# Patient Record
Sex: MALE | Race: WHITE | NOT HISPANIC OR LATINO
[De-identification: names, ages, dates, MRNs, and addresses within clinical notes are randomized per-mention and may not be internally consistent; named-entity substitution may affect disease eponyms.]

---

## 2013-12-27 RX ORDER — METOPROLOL TARTRATE 50 MG
1 TABLET ORAL
Qty: 0 | Refills: 0 | DISCHARGE
Start: 2013-12-27

## 2013-12-27 RX ORDER — POTASSIUM CHLORIDE 20 MEQ
1 PACKET (EA) ORAL
Qty: 0 | Refills: 0 | DISCHARGE
Start: 2013-12-27

## 2013-12-27 RX ORDER — POTASSIUM CHLORIDE 20 MEQ
1 PACKET (EA) ORAL
Qty: 0 | Refills: 0 | COMMUNITY
Start: 2013-12-27

## 2013-12-27 RX ORDER — METOPROLOL TARTRATE 50 MG
1.5 TABLET ORAL
Qty: 0 | Refills: 0 | COMMUNITY
Start: 2013-12-27

## 2017-01-26 ENCOUNTER — APPOINTMENT (OUTPATIENT)
Dept: ORTHOPEDIC SURGERY | Facility: CLINIC | Age: 67
End: 2017-01-26

## 2017-03-06 ENCOUNTER — OUTPATIENT (OUTPATIENT)
Dept: OUTPATIENT SERVICES | Facility: HOSPITAL | Age: 67
LOS: 1 days | End: 2017-03-06
Payer: MEDICARE

## 2017-03-06 ENCOUNTER — APPOINTMENT (OUTPATIENT)
Dept: MRI IMAGING | Facility: CLINIC | Age: 67
End: 2017-03-06

## 2017-03-06 DIAGNOSIS — Z00.8 ENCOUNTER FOR OTHER GENERAL EXAMINATION: ICD-10-CM

## 2017-03-06 PROCEDURE — 71555 MRI ANGIO CHEST W OR W/O DYE: CPT | Mod: 26

## 2017-03-06 PROCEDURE — 74185 MRA ABD W OR W/O CNTRST: CPT | Mod: 26

## 2017-03-07 PROCEDURE — A9585: CPT

## 2017-03-07 PROCEDURE — 82565 ASSAY OF CREATININE: CPT

## 2017-03-07 PROCEDURE — C8911: CPT

## 2017-03-07 PROCEDURE — C8902: CPT

## 2017-03-22 ENCOUNTER — APPOINTMENT (OUTPATIENT)
Dept: NEUROLOGY | Facility: CLINIC | Age: 67
End: 2017-03-22

## 2017-03-22 VITALS — SYSTOLIC BLOOD PRESSURE: 127 MMHG | DIASTOLIC BLOOD PRESSURE: 84 MMHG | HEART RATE: 61 BPM

## 2017-03-22 VITALS — HEART RATE: 61 BPM | SYSTOLIC BLOOD PRESSURE: 133 MMHG | DIASTOLIC BLOOD PRESSURE: 91 MMHG

## 2017-03-26 ENCOUNTER — FORM ENCOUNTER (OUTPATIENT)
Age: 67
End: 2017-03-26

## 2017-03-27 ENCOUNTER — APPOINTMENT (OUTPATIENT)
Dept: MRI IMAGING | Facility: CLINIC | Age: 67
End: 2017-03-27

## 2017-03-27 ENCOUNTER — APPOINTMENT (OUTPATIENT)
Dept: RADIOLOGY | Facility: CLINIC | Age: 67
End: 2017-03-27

## 2017-03-27 ENCOUNTER — OUTPATIENT (OUTPATIENT)
Dept: OUTPATIENT SERVICES | Facility: HOSPITAL | Age: 67
LOS: 1 days | End: 2017-03-27
Payer: MEDICARE

## 2017-03-27 ENCOUNTER — APPOINTMENT (OUTPATIENT)
Dept: CT IMAGING | Facility: CLINIC | Age: 67
End: 2017-03-27
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
## 2017-03-27 PROCEDURE — 70544 MR ANGIOGRAPHY HEAD W/O DYE: CPT

## 2017-03-27 PROCEDURE — 70547 MR ANGIOGRAPHY NECK W/O DYE: CPT

## 2017-03-27 PROCEDURE — 72040 X-RAY EXAM NECK SPINE 2-3 VW: CPT

## 2017-03-27 PROCEDURE — 70450 CT HEAD/BRAIN W/O DYE: CPT

## 2017-03-29 ENCOUNTER — RX RENEWAL (OUTPATIENT)
Age: 67
End: 2017-03-29

## 2017-03-30 ENCOUNTER — APPOINTMENT (OUTPATIENT)
Dept: NEUROLOGY | Facility: CLINIC | Age: 67
End: 2017-03-30

## 2017-04-16 ENCOUNTER — RX RENEWAL (OUTPATIENT)
Age: 67
End: 2017-04-16

## 2017-04-26 ENCOUNTER — EMERGENCY (EMERGENCY)
Facility: HOSPITAL | Age: 67
LOS: 1 days | Discharge: HOME | End: 2017-04-26
Attending: EMERGENCY MEDICINE | Admitting: EMERGENCY MEDICINE
Payer: MEDICARE

## 2017-04-26 VITALS
OXYGEN SATURATION: 99 % | TEMPERATURE: 98 F | RESPIRATION RATE: 16 BRPM | DIASTOLIC BLOOD PRESSURE: 103 MMHG | SYSTOLIC BLOOD PRESSURE: 143 MMHG | HEART RATE: 63 BPM

## 2017-04-26 DIAGNOSIS — I10 ESSENTIAL (PRIMARY) HYPERTENSION: ICD-10-CM

## 2017-04-26 DIAGNOSIS — E83.52 HYPERCALCEMIA: ICD-10-CM

## 2017-04-26 DIAGNOSIS — Z98.890 OTHER SPECIFIED POSTPROCEDURAL STATES: Chronic | ICD-10-CM

## 2017-04-26 DIAGNOSIS — Z29.9 ENCOUNTER FOR PROPHYLACTIC MEASURES, UNSPECIFIED: ICD-10-CM

## 2017-04-26 DIAGNOSIS — R53.1 WEAKNESS: ICD-10-CM

## 2017-04-26 LAB
ALBUMIN SERPL ELPH-MCNC: 4.3 G/DL — SIGNIFICANT CHANGE UP (ref 3.3–5)
ALP SERPL-CCNC: 84 U/L — SIGNIFICANT CHANGE UP (ref 40–120)
ALT FLD-CCNC: 36 U/L RC — SIGNIFICANT CHANGE UP (ref 10–45)
ANION GAP SERPL CALC-SCNC: 14 MMOL/L — SIGNIFICANT CHANGE UP (ref 5–17)
APTT BLD: 38.1 SEC — HIGH (ref 27.5–37.4)
AST SERPL-CCNC: 30 U/L — SIGNIFICANT CHANGE UP (ref 10–40)
BASOPHILS # BLD AUTO: 0 K/UL — SIGNIFICANT CHANGE UP (ref 0–0.2)
BASOPHILS NFR BLD AUTO: 0.7 % — SIGNIFICANT CHANGE UP (ref 0–2)
BILIRUB SERPL-MCNC: 0.7 MG/DL — SIGNIFICANT CHANGE UP (ref 0.2–1.2)
BUN SERPL-MCNC: 28 MG/DL — HIGH (ref 7–23)
CALCIUM SERPL-MCNC: 10.8 MG/DL — HIGH (ref 8.4–10.5)
CHLORIDE SERPL-SCNC: 99 MMOL/L — SIGNIFICANT CHANGE UP (ref 96–108)
CO2 SERPL-SCNC: 31 MMOL/L — SIGNIFICANT CHANGE UP (ref 22–31)
CREAT SERPL-MCNC: 1.21 MG/DL — SIGNIFICANT CHANGE UP (ref 0.5–1.3)
EOSINOPHIL # BLD AUTO: 0.2 K/UL — SIGNIFICANT CHANGE UP (ref 0–0.5)
EOSINOPHIL NFR BLD AUTO: 3.2 % — SIGNIFICANT CHANGE UP (ref 0–6)
GLUCOSE SERPL-MCNC: 114 MG/DL — HIGH (ref 70–99)
HCT VFR BLD CALC: 51.2 % — HIGH (ref 39–50)
HGB BLD-MCNC: 17.7 G/DL — HIGH (ref 13–17)
INR BLD: 1.08 RATIO — SIGNIFICANT CHANGE UP (ref 0.88–1.16)
LYMPHOCYTES # BLD AUTO: 1.3 K/UL — SIGNIFICANT CHANGE UP (ref 1–3.3)
LYMPHOCYTES # BLD AUTO: 20 % — SIGNIFICANT CHANGE UP (ref 13–44)
MCHC RBC-ENTMCNC: 30 PG — SIGNIFICANT CHANGE UP (ref 27–34)
MCHC RBC-ENTMCNC: 34.5 GM/DL — SIGNIFICANT CHANGE UP (ref 32–36)
MCV RBC AUTO: 86.9 FL — SIGNIFICANT CHANGE UP (ref 80–100)
MONOCYTES # BLD AUTO: 0.5 K/UL — SIGNIFICANT CHANGE UP (ref 0–0.9)
MONOCYTES NFR BLD AUTO: 7.4 % — SIGNIFICANT CHANGE UP (ref 2–14)
NEUTROPHILS # BLD AUTO: 4.4 K/UL — SIGNIFICANT CHANGE UP (ref 1.8–7.4)
NEUTROPHILS NFR BLD AUTO: 68.6 % — SIGNIFICANT CHANGE UP (ref 43–77)
PLATELET # BLD AUTO: 167 K/UL — SIGNIFICANT CHANGE UP (ref 150–400)
POTASSIUM SERPL-MCNC: 3.8 MMOL/L — SIGNIFICANT CHANGE UP (ref 3.5–5.3)
POTASSIUM SERPL-SCNC: 3.8 MMOL/L — SIGNIFICANT CHANGE UP (ref 3.5–5.3)
PROT SERPL-MCNC: 6.8 G/DL — SIGNIFICANT CHANGE UP (ref 6–8.3)
PROT SERPL-MCNC: 6.8 G/DL — SIGNIFICANT CHANGE UP (ref 6–8.3)
PROT SERPL-MCNC: 7.1 G/DL — SIGNIFICANT CHANGE UP (ref 6–8.3)
PROTHROM AB SERPL-ACNC: 11.8 SEC — SIGNIFICANT CHANGE UP (ref 9.8–12.7)
RBC # BLD: 5.89 M/UL — HIGH (ref 4.2–5.8)
RBC # FLD: 12 % — SIGNIFICANT CHANGE UP (ref 10.3–14.5)
SODIUM SERPL-SCNC: 144 MMOL/L — SIGNIFICANT CHANGE UP (ref 135–145)
WBC # BLD: 6.4 K/UL — SIGNIFICANT CHANGE UP (ref 3.8–10.5)
WBC # FLD AUTO: 6.4 K/UL — SIGNIFICANT CHANGE UP (ref 3.8–10.5)

## 2017-04-26 PROCEDURE — 93010 ELECTROCARDIOGRAM REPORT: CPT | Mod: GC

## 2017-04-26 PROCEDURE — 99284 EMERGENCY DEPT VISIT MOD MDM: CPT | Mod: 25,GC

## 2017-04-26 RX ORDER — ASPIRIN/CALCIUM CARB/MAGNESIUM 324 MG
81 TABLET ORAL DAILY
Qty: 0 | Refills: 0 | Status: DISCONTINUED | OUTPATIENT
Start: 2017-04-26 | End: 2017-04-27

## 2017-04-26 RX ORDER — SERTRALINE 25 MG/1
100 TABLET, FILM COATED ORAL DAILY
Qty: 0 | Refills: 0 | Status: DISCONTINUED | OUTPATIENT
Start: 2017-04-26 | End: 2017-04-27

## 2017-04-26 RX ORDER — NIACIN 50 MG
500 TABLET ORAL AT BEDTIME
Qty: 0 | Refills: 0 | Status: DISCONTINUED | OUTPATIENT
Start: 2017-04-26 | End: 2017-04-27

## 2017-04-26 RX ORDER — METOPROLOL TARTRATE 50 MG
150 TABLET ORAL
Qty: 0 | Refills: 0 | Status: DISCONTINUED | OUTPATIENT
Start: 2017-04-26 | End: 2017-04-27

## 2017-04-26 RX ORDER — HEPARIN SODIUM 5000 [USP'U]/ML
5000 INJECTION INTRAVENOUS; SUBCUTANEOUS EVERY 8 HOURS
Qty: 0 | Refills: 0 | Status: DISCONTINUED | OUTPATIENT
Start: 2017-04-26 | End: 2017-04-27

## 2017-04-26 RX ORDER — AMLODIPINE BESYLATE 2.5 MG/1
10 TABLET ORAL DAILY
Qty: 0 | Refills: 0 | Status: DISCONTINUED | OUTPATIENT
Start: 2017-04-26 | End: 2017-04-27

## 2017-04-26 RX ORDER — FAMOTIDINE 10 MG/ML
40 INJECTION INTRAVENOUS DAILY
Qty: 0 | Refills: 0 | Status: DISCONTINUED | OUTPATIENT
Start: 2017-04-26 | End: 2017-04-27

## 2017-04-26 RX ADMIN — FAMOTIDINE 40 MILLIGRAM(S): 10 INJECTION INTRAVENOUS at 23:01

## 2017-04-26 RX ADMIN — Medication 500 MILLIGRAM(S): at 23:01

## 2017-04-26 RX ADMIN — HEPARIN SODIUM 5000 UNIT(S): 5000 INJECTION INTRAVENOUS; SUBCUTANEOUS at 23:01

## 2017-04-26 RX ADMIN — Medication 81 MILLIGRAM(S): at 23:01

## 2017-04-26 RX ADMIN — AMLODIPINE BESYLATE 10 MILLIGRAM(S): 2.5 TABLET ORAL at 23:01

## 2017-04-26 RX ADMIN — Medication 150 MILLIGRAM(S): at 23:01

## 2017-04-26 RX ADMIN — SERTRALINE 100 MILLIGRAM(S): 25 TABLET, FILM COATED ORAL at 23:46

## 2017-04-26 NOTE — H&P ADULT. - ATTENDING COMMENTS
acute RLE weakness without parathesias or numbness  Seen by neuro  Head CT negative  Plan for MRI of LT spine  Check CPK  will need EMG/NCV

## 2017-04-26 NOTE — ED ADULT NURSE REASSESSMENT NOTE - NS ED NURSE REASSESS COMMENT FT1
Patient and wife made aware that CT results are still pending.  Patient resting comfortably in stretcher in no acute distress.  Patient had no needs at this time.  Safety ensured.

## 2017-04-26 NOTE — ED ADULT NURSE NOTE - CHIEF COMPLAINT QUOTE
right leg weakness since 2 am when to bed and now has spasms in his calf able to walk bu has difficulty no neuro deficits noted

## 2017-04-26 NOTE — H&P ADULT. - RS GEN PE MLT RESP DETAILS PC
no rhonchi/good air movement/clear to auscultation bilaterally/breath sounds equal/airway patent/respirations non-labored/no rales/no wheezes

## 2017-04-26 NOTE — H&P ADULT. - PROBLEM SELECTOR PLAN 1
-less likely central nervous system deficit as no cranial nerve deficits and weakness limited to one extremity vs hemiparesis.   -differential includes peripheral nerve compression 2/2 spinal stenosis vs arterial insufficieny vs neuroma   -metabolic -less likely central nervous system deficit as no cranial nerve deficits and weakness limited to one extremity vs hemiparesis  -differential includes peripheral nerve compression 2/2 spinal stenosis vs arterial insufficieny vs neuroma   -metabolic, acute inflammatory demyelinating polymononeuropathy  2/2 autoimmune, post viral metabolic  Neg CK therefore less likely muscle pathology  -Neurology following. Follow up MRI of T/L spine. Will likely need EMG as outpatient  -PT consulted

## 2017-04-26 NOTE — H&P ADULT. - NEGATIVE CARDIOVASCULAR SYMPTOMS
no peripheral edema/no palpitations/no orthopnea/no paroxysmal nocturnal dyspnea/no dyspnea on exertion/no chest pain

## 2017-04-26 NOTE — H&P ADULT. - ASSESSMENT
67 y/o man with a pmh of HTN, central retinal vein thrombosis, thoracic aortic dissection s/p repair in 2013 presents to the ED with RLE weakness

## 2017-04-26 NOTE — H&P ADULT. - RADIOLOGY RESULTS AND INTERPRETATION
Personally reviewed.  CT lumbar spine: Severe lumbar stenosis at the L1-L2 to L3-L4  CT head: No acute infarcts

## 2017-04-26 NOTE — H&P ADULT. - NEGATIVE NEUROLOGICAL SYMPTOMS
no syncope/no focal seizures/no tremors/no confusion/no headache/no facial palsy/no vertigo/no loss of sensation/no loss of consciousness/no generalized seizures

## 2017-04-26 NOTE — ED PROVIDER NOTE - OBJECTIVE STATEMENT
65 yo m with history of HTN, Thoracic aortic dissection that is repaired, presents with chief complaint of isolated right leg weakness that began yesterday evening with difficulty walking. He reports feeling like "someone else is controlling my leg."  Also reports flare of his chronic back pain that began 3-4 days ago. Denies radiation to the buttock or hamstrings. The patient denies slurred speech, drooling, numbness or facial drooping.

## 2017-04-26 NOTE — ED ADULT NURSE NOTE - OBJECTIVE STATEMENT
66 year old male alert and oriented x 4 came to the ED accompanied by Wife c/o right foot weakness since 0200 today.  patient said he felt some weakness in the right foot and leg which began suddenly this morning.  Patient says he is having some difficulty walking r/t the weakness, but was able to ambulate with a steady gait.  Patient denies any recent long car rides or airplane trips.  Patient denies; chest pain, shortness of breath, nausea, vomiting, change in vision, numbness or tingling, pain, loss of sensation.  Right foot appears similar in color and temperature to the left and pulse is present.  Patient able to elevate his leg and wiggle his toes and ROM in all 4 extremities.   Patient placed on CM and in sinus rhythm and IV established in the right AC #18.  Patient has a history of hypokalemia and takes po potassium and says he has been taking his potassium as prescribed.  Safety ensured.  Fingerstick 107. 66 year old male alert and oriented x 4 came to the ED accompanied by Wife c/o right foot weakness since 0200 today.  patient said he felt some weakness in the right foot and leg which began suddenly this morning.  Patient says he is having some difficulty walking r/t the weakness, but was able to ambulate with a steady gait.  Patient denies any recent long car rides or airplane trips.  Patient denies; chest pain, shortness of breath, nausea, vomiting, change in vision, numbness or tingling, pain, loss of sensation.  Denies weakness in any other extremities.  Right foot appears similar in color and temperature to the left and pulse is present.  Patient able to elevate his leg and wiggle his toes and ROM in all 4 extremities.   Patient placed on CM and in sinus rhythm and IV established in the right AC #18.  Patient has a history of hypokalemia and takes po potassium and says he has been taking his potassium as prescribed.  Safety ensured.  Fingerstick 107.

## 2017-04-26 NOTE — ED ADULT NURSE REASSESSMENT NOTE - NS ED NURSE REASSESS COMMENT FT1
Patient updated on admission status and patient resting comfortably in the stretcher.  Patient in no acute distress and has no needs at this time.  Safety ensured.

## 2017-04-26 NOTE — ED PROVIDER NOTE - PROGRESS NOTE DETAILS
Signs concerning for CVA, discussed with neuro Per neuro, outpatient MRI ok. Patient unable to ambulate. Will admit to hospitalist.

## 2017-04-26 NOTE — H&P ADULT. - HISTORY OF PRESENT ILLNESS
67 y/o man with a pmh of HTN, central retinal vein thrombosis, thoracic aortic dissection s/p repair in 2013 presents to the ED with 2 days history of RLE weakness. Pt states yesterday morning, he stood up from the couch and noticed that he was unable to move his right leg. Denies numbness, change in sensation, or pain in the leg. Reports mild back ache that is chronic. Otherwise denies any accompanying symptoms at the time. Pt thought it was related to his low potassium levels therefore he did not seek medical attention at the time. The next day, he noted no improvement in RLE weakness therefore decided to come in. Denies facial droop, slurred speech, urinary/bowel incontinence or saddle anesthesia, changes in vision, chest pain, SOB. About 6 weeks PTA patient recalls an episode of syncope for which he had a full workup including MRA of the head and carotid dopplers which were negative.

## 2017-04-26 NOTE — ED ADULT TRIAGE NOTE - CHIEF COMPLAINT QUOTE
right leg weakness since 2 am when to bed and now has spasms in his calf able to walk bu has difficulty no neuro deficits noted right leg weakness since 2 am when to bed and now has spasms in his calf able to walk bu has difficulty no neuro deficits noted HX of stroke " wife thinks he is having a stroke "

## 2017-04-27 VITALS — OXYGEN SATURATION: 99 % | HEART RATE: 64 BPM | SYSTOLIC BLOOD PRESSURE: 145 MMHG | DIASTOLIC BLOOD PRESSURE: 97 MMHG

## 2017-04-27 LAB
% ALBUMIN: 64.1 % — SIGNIFICANT CHANGE UP
% ALPHA 1: 4.6 % — SIGNIFICANT CHANGE UP
% ALPHA 2: 12.5 % — SIGNIFICANT CHANGE UP
% BETA: 9.8 % — SIGNIFICANT CHANGE UP
% GAMMA: 9 % — SIGNIFICANT CHANGE UP
ALBUMIN SERPL ELPH-MCNC: 4.4 G/DL — SIGNIFICANT CHANGE UP (ref 3.6–5.5)
ALBUMIN/GLOB SERPL ELPH: 1.8 RATIO — SIGNIFICANT CHANGE UP
ALPHA1 GLOB SERPL ELPH-MCNC: 0.3 G/DL — SIGNIFICANT CHANGE UP (ref 0.1–0.4)
ALPHA2 GLOB SERPL ELPH-MCNC: 0.8 G/DL — SIGNIFICANT CHANGE UP (ref 0.5–1)
ANION GAP SERPL CALC-SCNC: 12 MMOL/L — SIGNIFICANT CHANGE UP (ref 5–17)
ANION GAP SERPL CALC-SCNC: 12 MMOL/L — SIGNIFICANT CHANGE UP (ref 5–17)
B-GLOBULIN SERPL ELPH-MCNC: 0.7 G/DL — SIGNIFICANT CHANGE UP (ref 0.5–1)
BASOPHILS # BLD AUTO: 0 K/UL — SIGNIFICANT CHANGE UP (ref 0–0.2)
BASOPHILS NFR BLD AUTO: 0.6 % — SIGNIFICANT CHANGE UP (ref 0–2)
BUN SERPL-MCNC: 24 MG/DL — HIGH (ref 7–23)
BUN SERPL-MCNC: 26 MG/DL — HIGH (ref 7–23)
CALCIUM SERPL-MCNC: 10.3 MG/DL — SIGNIFICANT CHANGE UP (ref 8.4–10.5)
CALCIUM SERPL-MCNC: 10.7 MG/DL — HIGH (ref 8.4–10.5)
CHLORIDE SERPL-SCNC: 100 MMOL/L — SIGNIFICANT CHANGE UP (ref 96–108)
CHLORIDE SERPL-SCNC: 99 MMOL/L — SIGNIFICANT CHANGE UP (ref 96–108)
CO2 SERPL-SCNC: 28 MMOL/L — SIGNIFICANT CHANGE UP (ref 22–31)
CO2 SERPL-SCNC: 29 MMOL/L — SIGNIFICANT CHANGE UP (ref 22–31)
CREAT SERPL-MCNC: 1.13 MG/DL — SIGNIFICANT CHANGE UP (ref 0.5–1.3)
CREAT SERPL-MCNC: 1.26 MG/DL — SIGNIFICANT CHANGE UP (ref 0.5–1.3)
EOSINOPHIL # BLD AUTO: 0.2 K/UL — SIGNIFICANT CHANGE UP (ref 0–0.5)
EOSINOPHIL NFR BLD AUTO: 2.9 % — SIGNIFICANT CHANGE UP (ref 0–6)
ERYTHROCYTE [SEDIMENTATION RATE] IN BLOOD: 4 MM/HR — SIGNIFICANT CHANGE UP (ref 0–20)
FERRITIN SERPL-MCNC: 167.7 NG/ML — SIGNIFICANT CHANGE UP (ref 30–400)
FOLATE SERPL-MCNC: >20 NG/ML — SIGNIFICANT CHANGE UP (ref 4.8–24.2)
GAMMA GLOBULIN: 0.6 G/DL — SIGNIFICANT CHANGE UP (ref 0.6–1.6)
GLUCOSE SERPL-MCNC: 107 MG/DL — HIGH (ref 70–99)
GLUCOSE SERPL-MCNC: 119 MG/DL — HIGH (ref 70–99)
HCT VFR BLD CALC: 46.9 % — SIGNIFICANT CHANGE UP (ref 39–50)
HGB BLD-MCNC: 16.2 G/DL — SIGNIFICANT CHANGE UP (ref 13–17)
LYMPHOCYTES # BLD AUTO: 1.7 K/UL — SIGNIFICANT CHANGE UP (ref 1–3.3)
LYMPHOCYTES # BLD AUTO: 24.2 % — SIGNIFICANT CHANGE UP (ref 13–44)
MAGNESIUM SERPL-MCNC: 2 MG/DL — SIGNIFICANT CHANGE UP (ref 1.6–2.6)
MAGNESIUM SERPL-MCNC: 2.5 MG/DL — SIGNIFICANT CHANGE UP (ref 1.6–2.6)
MCHC RBC-ENTMCNC: 29.5 PG — SIGNIFICANT CHANGE UP (ref 27–34)
MCHC RBC-ENTMCNC: 34.6 GM/DL — SIGNIFICANT CHANGE UP (ref 32–36)
MCV RBC AUTO: 85.4 FL — SIGNIFICANT CHANGE UP (ref 80–100)
MONOCYTES # BLD AUTO: 0.7 K/UL — SIGNIFICANT CHANGE UP (ref 0–0.9)
MONOCYTES NFR BLD AUTO: 10.3 % — SIGNIFICANT CHANGE UP (ref 2–14)
NEUTROPHILS # BLD AUTO: 4.3 K/UL — SIGNIFICANT CHANGE UP (ref 1.8–7.4)
NEUTROPHILS NFR BLD AUTO: 61.9 % — SIGNIFICANT CHANGE UP (ref 43–77)
PHOSPHATE SERPL-MCNC: 2.7 MG/DL — SIGNIFICANT CHANGE UP (ref 2.5–4.5)
PHOSPHATE SERPL-MCNC: 3.2 MG/DL — SIGNIFICANT CHANGE UP (ref 2.5–4.5)
PLATELET # BLD AUTO: 135 K/UL — LOW (ref 150–400)
POTASSIUM SERPL-MCNC: 2.7 MMOL/L — CRITICAL LOW (ref 3.5–5.3)
POTASSIUM SERPL-MCNC: 3.8 MMOL/L — SIGNIFICANT CHANGE UP (ref 3.5–5.3)
POTASSIUM SERPL-SCNC: 2.7 MMOL/L — CRITICAL LOW (ref 3.5–5.3)
POTASSIUM SERPL-SCNC: 3.8 MMOL/L — SIGNIFICANT CHANGE UP (ref 3.5–5.3)
PROT PATTERN SERPL ELPH-IMP: SIGNIFICANT CHANGE UP
RBC # BLD: 5.49 M/UL — SIGNIFICANT CHANGE UP (ref 4.2–5.8)
RBC # FLD: 12.2 % — SIGNIFICANT CHANGE UP (ref 10.3–14.5)
SODIUM SERPL-SCNC: 139 MMOL/L — SIGNIFICANT CHANGE UP (ref 135–145)
SODIUM SERPL-SCNC: 141 MMOL/L — SIGNIFICANT CHANGE UP (ref 135–145)
VIT B12 SERPL-MCNC: 579 PG/ML — SIGNIFICANT CHANGE UP (ref 243–894)
WBC # BLD: 7 K/UL — SIGNIFICANT CHANGE UP (ref 3.8–10.5)
WBC # FLD AUTO: 7 K/UL — SIGNIFICANT CHANGE UP (ref 3.8–10.5)

## 2017-04-27 PROCEDURE — 82550 ASSAY OF CK (CPK): CPT

## 2017-04-27 PROCEDURE — 97161 PT EVAL LOW COMPLEX 20 MIN: CPT

## 2017-04-27 PROCEDURE — 70450 CT HEAD/BRAIN W/O DYE: CPT

## 2017-04-27 PROCEDURE — 82746 ASSAY OF FOLIC ACID SERUM: CPT

## 2017-04-27 PROCEDURE — 72131 CT LUMBAR SPINE W/O DYE: CPT

## 2017-04-27 PROCEDURE — 72148 MRI LUMBAR SPINE W/O DYE: CPT

## 2017-04-27 PROCEDURE — 80048 BASIC METABOLIC PNL TOTAL CA: CPT

## 2017-04-27 PROCEDURE — 82962 GLUCOSE BLOOD TEST: CPT

## 2017-04-27 PROCEDURE — 93005 ELECTROCARDIOGRAM TRACING: CPT

## 2017-04-27 PROCEDURE — 72146 MRI CHEST SPINE W/O DYE: CPT

## 2017-04-27 PROCEDURE — 85652 RBC SED RATE AUTOMATED: CPT

## 2017-04-27 PROCEDURE — 70551 MRI BRAIN STEM W/O DYE: CPT

## 2017-04-27 PROCEDURE — 84155 ASSAY OF PROTEIN SERUM: CPT

## 2017-04-27 PROCEDURE — 82728 ASSAY OF FERRITIN: CPT

## 2017-04-27 PROCEDURE — 85730 THROMBOPLASTIN TIME PARTIAL: CPT

## 2017-04-27 PROCEDURE — 82607 VITAMIN B-12: CPT

## 2017-04-27 PROCEDURE — 85027 COMPLETE CBC AUTOMATED: CPT

## 2017-04-27 PROCEDURE — 85610 PROTHROMBIN TIME: CPT

## 2017-04-27 PROCEDURE — 80053 COMPREHEN METABOLIC PANEL: CPT

## 2017-04-27 PROCEDURE — 84100 ASSAY OF PHOSPHORUS: CPT

## 2017-04-27 PROCEDURE — 84165 PROTEIN E-PHORESIS SERUM: CPT

## 2017-04-27 PROCEDURE — 99285 EMERGENCY DEPT VISIT HI MDM: CPT | Mod: 25

## 2017-04-27 PROCEDURE — 83735 ASSAY OF MAGNESIUM: CPT

## 2017-04-27 RX ORDER — POTASSIUM CHLORIDE 20 MEQ
20 PACKET (EA) ORAL
Qty: 0 | Refills: 0 | Status: DISCONTINUED | OUTPATIENT
Start: 2017-04-28 | End: 2017-04-27

## 2017-04-27 RX ORDER — POTASSIUM CHLORIDE 20 MEQ
10 PACKET (EA) ORAL
Qty: 0 | Refills: 0 | Status: COMPLETED | OUTPATIENT
Start: 2017-04-27 | End: 2017-04-27

## 2017-04-27 RX ORDER — POTASSIUM CHLORIDE 20 MEQ
40 PACKET (EA) ORAL EVERY 4 HOURS
Qty: 0 | Refills: 0 | Status: COMPLETED | OUTPATIENT
Start: 2017-04-27 | End: 2017-04-27

## 2017-04-27 RX ADMIN — Medication 40 MILLIEQUIVALENT(S): at 09:00

## 2017-04-27 RX ADMIN — AMLODIPINE BESYLATE 10 MILLIGRAM(S): 2.5 TABLET ORAL at 08:59

## 2017-04-27 RX ADMIN — HEPARIN SODIUM 5000 UNIT(S): 5000 INJECTION INTRAVENOUS; SUBCUTANEOUS at 06:31

## 2017-04-27 RX ADMIN — Medication 150 MILLIGRAM(S): at 18:01

## 2017-04-27 RX ADMIN — Medication 100 MILLIEQUIVALENT(S): at 11:20

## 2017-04-27 RX ADMIN — Medication 100 MILLIEQUIVALENT(S): at 07:48

## 2017-04-27 RX ADMIN — HEPARIN SODIUM 5000 UNIT(S): 5000 INJECTION INTRAVENOUS; SUBCUTANEOUS at 14:19

## 2017-04-27 RX ADMIN — FAMOTIDINE 40 MILLIGRAM(S): 10 INJECTION INTRAVENOUS at 11:22

## 2017-04-27 RX ADMIN — Medication 81 MILLIGRAM(S): at 11:22

## 2017-04-27 RX ADMIN — Medication 100 MILLIEQUIVALENT(S): at 13:22

## 2017-04-27 RX ADMIN — Medication 40 MILLIEQUIVALENT(S): at 13:20

## 2017-04-27 RX ADMIN — Medication 150 MILLIGRAM(S): at 06:31

## 2017-04-27 NOTE — DISCHARGE NOTE ADULT - PLAN OF CARE
determine etiology and improve strength You were admitted for isolated weakness of your right lower extremity. You had imaging which revealed only degenerative changes of your spine. You were noted to have electrolyte abnormalities therefore you should discontinue chlorthalidone and only take potassium supplement once a day. Please continue to follow with neurology regarding complete workup of your weakness. Please discontinue chlorthalidone use. Continue metoprolol and amlodipine as prescribed and continue to follow with your PMD. Your calcium was high on admission. Please discontinue the Os-brittney, you can continue calcitriol. Please continue to follow with your PMD to recheck your calcium.

## 2017-04-27 NOTE — PROVIDER CONTACT NOTE (OTHER) - SITUATION
Pt was on metoprolol 150mg twice a day while in the hospital. Pt is being discharged with metoprolol 150mg po once a day in AM and 100mg po once a day at PM

## 2017-04-27 NOTE — DISCHARGE NOTE ADULT - CARE PLAN
Principal Discharge DX:	Weakness  Goal:	determine etiology and improve strength  Instructions for follow-up, activity and diet:	You were admitted for isolated weakness of your right lower extremity. You had imaging which revealed only degenerative changes of your spine. You were noted to have electrolyte abnormalities therefore you should discontinue chlorthalidone and only take potassium supplement once a day. Please continue to follow with neurology regarding complete workup of your weakness.  Secondary Diagnosis:	Hypertension  Instructions for follow-up, activity and diet:	Please discontinue chlorthalidone use. Continue metoprolol and amlodipine as prescribed and continue to follow with your PMD.  Secondary Diagnosis:	Hypercalcemia  Instructions for follow-up, activity and diet:	Your calcium was high on admission. Please discontinue the Os-brittney, you can continue calcitriol. Please continue to follow with your PMD to recheck your calcium.

## 2017-04-27 NOTE — PROVIDER CONTACT NOTE (OTHER) - ACTION/TREATMENT ORDERED:
As per Dr. Caban pt to resume home dosage of metoprolol and receive metoprolol 150mg po once a day in AM and 100mg po once a day at night.

## 2017-04-27 NOTE — DISCHARGE NOTE ADULT - HOSPITAL COURSE
66M history thoracic aneurysm s/p repair, htn, recurrent falls presents with acute right lower extremity weakness. CT head was negative for acute infarct or bleed.  CT of the lumbar spine was suggestive of lumbar stenosis. Pt was evaluated by neurology who requested MRI imaging. MRI of the head which was also negative for acute infarct. MRI of the thoracic and lumbar spine were consistent with degenerative changes however negative for nerve compression. Creatine kinase was normal. Electrotypes only significant for hypokalemia which was repleted and hypercalcemia therefore os-brittney was discontinued. Chlorthalidone was discontinued.  Pt was evaluated by PT and will go home with home PT.  Pt is stable for discharge with neurology and PMD follow up for more complete workup of isolated weakness.

## 2017-04-27 NOTE — DISCHARGE NOTE ADULT - CARE PROVIDER_API CALL
Denae Escoto), Neurology  [address] [place], [place] [address]  Phone: [phone]  Fax: [phone]

## 2017-04-27 NOTE — PHYSICAL THERAPY INITIAL EVALUATION ADULT - ADDITIONAL COMMENTS
Pt states he lives in a house with his wife. Pt states there is a flight of stairs inside with +HR. Pt states he owns a RW. Pt states his wife can provide assistance when needed.

## 2017-04-27 NOTE — DISCHARGE NOTE ADULT - FINDINGS/TREATMENT
No  acute  intracranial  hemorrhage,  mass  effect  or  territorial  infarct.  No  change  from  3/27/2017 No  evidence  for  acute  infarct  or  acute  hemorrhage. Lumbar  congenital  and  degenerative  spondylosis  resulting  in  severe  lumbar  stenosis  at  the  L1-L2  through  L3-L4  levels;  if  symptoms  persist,  MRI  of  the  lumbar  spine  may  be  pursued  to  evaluate  for  nerve  root  impingement/compression.  Degenerative  changes  have  progressed  since  2/19/2014. Multilevel  degenerative  changes  involve  the  thoracic  spine  and  lumbar  spine  with  distribution  as  described.

## 2017-04-27 NOTE — DISCHARGE NOTE ADULT - MEDICATION SUMMARY - MEDICATIONS TO STOP TAKING
I will STOP taking the medications listed below when I get home from the hospital:    metoprolol tartrate 100 mg oral tablet  -- 1 tab(s) by mouth 2 times a day    chlorthalidone 25 mg oral tablet  -- 1 tab(s) by mouth once a day    Calcium 600+D oral tablet  -- 1 tab(s) by mouth once a day    desloratadine 5 mg oral tablet  -- 1 tab(s) by mouth once a day

## 2017-04-27 NOTE — PROVIDER CONTACT NOTE (CRITICAL VALUE NOTIFICATION) - TEST AND RESULT REPORTED:
Reviewed and agree with technician note, also re: Past Ocular/Family Ocular Hx,   allergies and medications.   Past Ocular history notation: GLC suspicion per Dr. Cho last year; Right BRAO, (asymtpmatic Did have normal carotid dopplers and echo, and normal CRP. Dr. Jenkins placed him in 81 nG baby ASA and statin ), history of floaters; Lattice degeneration left; mild lens changes; Xanthelasmas; controlled DM    CUP/DISC: 0.40/0.35 (left smaller nerve  FAMILY HISTORY: negative for glaucoma  DILATED EXAM: Last done 2/2016  VISUAL FIELD: last done- 0  OCT (optical coherence tomography): Right eye- 86   Left eye- 74  Last done 2/2016  PACHYMETRY: Right eye- 567   Left eye- 556  Last done 2/5/2016 TODAY  GONIOSCOPY: last done- 2016  CONTRAINDICATIONS/ALLERGIES: none  TARGET INTRAOCULAR PRESSURE: < 20  PREVIOUS GLAUCOMA LASERS: none  PREVIOUS GLAUCOMA SURGICAL PROCEDURES: none.  PREVIOUS REFRACTIVE SURGERY: none  HIGHEST INTRAOCULAR PRESSURE: Right eye- 21  Left eye- 22  Date- 2/2016    (Retinoscopy entered with manifest when full refraction. Over refraction-only* is performed if patient had no visual complaints, or declined full refraction)    Social/Occupational Hx/Notes:retired    Head/Face Exam: Normal  Mental Status:  Alert/Oriented x 3  See  other clinical data/information in relevant sections above.    ASSESSMENT/PLAN  Refractive error - mild change, a copy of today's physician performed manifest refraction was given to get new glasses  Cataract: both eyes, currently not affecting activities of daily living. Discussed elective nature of cataract removal.  Continue to follow conservatively, no surgery needed.  Glaucoma suspect: stable both eyes:  Doing well, Intraocular Pressure stable  Type 2 Diabetes without retinopathy and without macular edema both eyes - discussed importance of good blood sugar control and target hemoglobin A1C of 7.0 or less. I stressed the importance of annual eye exams. Reinforced the need  to comply with the current systemic diabetic treatment regimen.  The diagnosis of lattice/likedegeneration was reviewed with the patient.  The patient's increased risk or retinal detachment was discussed as well as the signs and symptoms of a retinal detachment.                     k=2.7

## 2017-04-27 NOTE — PHYSICAL THERAPY INITIAL EVALUATION ADULT - PERTINENT HX OF CURRENT PROBLEM, REHAB EVAL
Pt is a 65 y/o man with a pmh of HTN, central retinal vein thrombosis, thoracic aortic dissection s/p repair in 2013 presents to the ED with 2 days history of RLE weakness. Pt states yesterday morning, he stood up from the couch and noticed that he was unable to move his right leg. Denies numbness, change in sensation, or pain in the leg.

## 2017-04-27 NOTE — DISCHARGE NOTE ADULT - PATIENT PORTAL LINK FT
“You can access the [employer] Patient Portal, offered by [facility], by registering with the following website: [email or web address]”

## 2017-04-27 NOTE — DISCHARGE NOTE ADULT - MEDICATION SUMMARY - MEDICATIONS TO TAKE
I will START or STAY ON the medications listed below when I get home from the hospital:    aspirin 81 mg oral delayed release tablet  -- 1 tab(s) by mouth once a day  -- Indication: For S/P aortic dissection repair    Zoloft 100 mg oral tablet  -- 1 tab(s) by mouth once a day (at bedtime)  -- Indication: For Depression    Niaspan  mg oral tablet, extended release  -- 1 tab(s) by mouth once a day (at bedtime)  -- Indication: For HLD    metoprolol tartrate 100 mg oral tablet  -- 1.5 tab(s) by mouth once daily in the morning and 1 tab(s) by mouth once daily at night  -- Indication: For Hypertension    amLODIPine 10 mg oral tablet  -- 1 tab(s) by mouth once a day  -- Indication: For Hypertension    famotidine 40 mg oral tablet  -- 1 tab(s) by mouth once a day (at bedtime)  -- Indication: For GERD    magnesium oxide 400 mg (241.3 mg elemental magnesium) oral tablet  -- 1 tab(s) by mouth once a day  -- Indication: For Electrolyte deficiency    potassium chloride 20 mEq oral tablet, extended release  -- 1 tab(s) by mouth daily  -- Indication: For Electrolyte deficiency    calcitriol 0.25 mcg oral capsule  -- 1 cap(s) by mouth once a day  -- Indication: For Electrolyte deficiency

## 2017-05-02 RX ORDER — FAMOTIDINE 10 MG/ML
1 INJECTION INTRAVENOUS
Qty: 0 | Refills: 0 | COMMUNITY

## 2017-05-02 RX ORDER — MAGNESIUM OXIDE 400 MG ORAL TABLET 241.3 MG
1 TABLET ORAL
Qty: 0 | Refills: 0 | COMMUNITY

## 2017-05-02 RX ORDER — CHLORTHALIDONE 50 MG
1 TABLET ORAL
Qty: 0 | Refills: 0 | COMMUNITY

## 2017-05-02 RX ORDER — AMLODIPINE BESYLATE 2.5 MG/1
1 TABLET ORAL
Qty: 0 | Refills: 0 | COMMUNITY

## 2017-05-02 RX ORDER — SERTRALINE 25 MG/1
1 TABLET, FILM COATED ORAL
Qty: 0 | Refills: 0 | COMMUNITY

## 2017-05-18 ENCOUNTER — APPOINTMENT (OUTPATIENT)
Dept: CARDIOLOGY | Facility: CLINIC | Age: 67
End: 2017-05-18

## 2017-05-18 ENCOUNTER — NON-APPOINTMENT (OUTPATIENT)
Age: 67
End: 2017-05-18

## 2017-05-18 VITALS
DIASTOLIC BLOOD PRESSURE: 85 MMHG | BODY MASS INDEX: 26.48 KG/M2 | WEIGHT: 185 LBS | OXYGEN SATURATION: 97 % | HEART RATE: 63 BPM | SYSTOLIC BLOOD PRESSURE: 128 MMHG | HEIGHT: 70 IN

## 2017-05-19 ENCOUNTER — APPOINTMENT (OUTPATIENT)
Dept: ULTRASOUND IMAGING | Facility: CLINIC | Age: 67
End: 2017-05-19

## 2017-05-26 ENCOUNTER — APPOINTMENT (OUTPATIENT)
Dept: ULTRASOUND IMAGING | Facility: CLINIC | Age: 67
End: 2017-05-26

## 2017-05-26 ENCOUNTER — OUTPATIENT (OUTPATIENT)
Dept: OUTPATIENT SERVICES | Facility: HOSPITAL | Age: 67
LOS: 1 days | End: 2017-05-26
Payer: MEDICARE

## 2017-05-26 DIAGNOSIS — Z98.890 OTHER SPECIFIED POSTPROCEDURAL STATES: Chronic | ICD-10-CM

## 2017-05-26 DIAGNOSIS — Z00.8 ENCOUNTER FOR OTHER GENERAL EXAMINATION: ICD-10-CM

## 2017-05-26 PROCEDURE — 93975 VASCULAR STUDY: CPT

## 2017-06-01 ENCOUNTER — APPOINTMENT (OUTPATIENT)
Dept: NEUROLOGY | Facility: CLINIC | Age: 67
End: 2017-06-01

## 2017-06-01 VITALS
WEIGHT: 185 LBS | DIASTOLIC BLOOD PRESSURE: 92 MMHG | HEART RATE: 67 BPM | SYSTOLIC BLOOD PRESSURE: 136 MMHG | HEIGHT: 70 IN | BODY MASS INDEX: 26.48 KG/M2

## 2017-06-11 ENCOUNTER — RX RENEWAL (OUTPATIENT)
Age: 67
End: 2017-06-11

## 2017-06-21 ENCOUNTER — EMERGENCY (EMERGENCY)
Facility: HOSPITAL | Age: 67
LOS: 1 days | Discharge: HOME | End: 2017-06-21
Attending: EMERGENCY MEDICINE | Admitting: EMERGENCY MEDICINE
Payer: MEDICARE

## 2017-06-21 VITALS
HEART RATE: 67 BPM | OXYGEN SATURATION: 99 % | DIASTOLIC BLOOD PRESSURE: 117 MMHG | SYSTOLIC BLOOD PRESSURE: 159 MMHG | TEMPERATURE: 98 F | RESPIRATION RATE: 18 BRPM

## 2017-06-21 DIAGNOSIS — R10.13 EPIGASTRIC PAIN: ICD-10-CM

## 2017-06-21 DIAGNOSIS — Z79.82 LONG TERM (CURRENT) USE OF ASPIRIN: ICD-10-CM

## 2017-06-21 DIAGNOSIS — I10 ESSENTIAL (PRIMARY) HYPERTENSION: ICD-10-CM

## 2017-06-21 DIAGNOSIS — K80.20 CALCULUS OF GALLBLADDER WITHOUT CHOLECYSTITIS WITHOUT OBSTRUCTION: ICD-10-CM

## 2017-06-21 DIAGNOSIS — K86.2 CYST OF PANCREAS: ICD-10-CM

## 2017-06-21 DIAGNOSIS — Z98.890 OTHER SPECIFIED POSTPROCEDURAL STATES: Chronic | ICD-10-CM

## 2017-06-21 DIAGNOSIS — Z88.1 ALLERGY STATUS TO OTHER ANTIBIOTIC AGENTS STATUS: ICD-10-CM

## 2017-06-21 DIAGNOSIS — Z79.899 OTHER LONG TERM (CURRENT) DRUG THERAPY: ICD-10-CM

## 2017-06-21 PROCEDURE — 93010 ELECTROCARDIOGRAM REPORT: CPT

## 2017-06-21 PROCEDURE — 99284 EMERGENCY DEPT VISIT MOD MDM: CPT | Mod: 25,GC

## 2017-06-21 NOTE — ED ADULT NURSE NOTE - OBJECTIVE STATEMENT
Recvd pt with c/o mid to lower diffuse abdominal pain.  pt denies any n/v/d at this time.  pt is awake, alert and responsive to all stimuli.  no sob or respiratory distress noted at this time.  pt worked up and awaiting md reeval.  will continue to monitor.

## 2017-06-22 VITALS
RESPIRATION RATE: 16 BRPM | HEART RATE: 68 BPM | OXYGEN SATURATION: 99 % | SYSTOLIC BLOOD PRESSURE: 188 MMHG | DIASTOLIC BLOOD PRESSURE: 124 MMHG

## 2017-06-22 LAB
ALBUMIN SERPL ELPH-MCNC: 4.3 G/DL — SIGNIFICANT CHANGE UP (ref 3.3–5)
ALP SERPL-CCNC: 77 U/L — SIGNIFICANT CHANGE UP (ref 40–120)
ALT FLD-CCNC: 27 U/L RC — SIGNIFICANT CHANGE UP (ref 10–45)
ANION GAP SERPL CALC-SCNC: 12 MMOL/L — SIGNIFICANT CHANGE UP (ref 5–17)
APPEARANCE UR: CLEAR — SIGNIFICANT CHANGE UP
APTT BLD: 34.4 SEC — SIGNIFICANT CHANGE UP (ref 27.5–37.4)
AST SERPL-CCNC: 27 U/L — SIGNIFICANT CHANGE UP (ref 10–40)
BASOPHILS # BLD AUTO: 0 K/UL — SIGNIFICANT CHANGE UP (ref 0–0.2)
BASOPHILS NFR BLD AUTO: 0.6 % — SIGNIFICANT CHANGE UP (ref 0–2)
BILIRUB DIRECT SERPL-MCNC: 0.1 MG/DL — SIGNIFICANT CHANGE UP (ref 0–0.2)
BILIRUB INDIRECT FLD-MCNC: 0.5 MG/DL — SIGNIFICANT CHANGE UP (ref 0.2–1)
BILIRUB SERPL-MCNC: 0.6 MG/DL — SIGNIFICANT CHANGE UP (ref 0.2–1.2)
BILIRUB SERPL-MCNC: 0.6 MG/DL — SIGNIFICANT CHANGE UP (ref 0.2–1.2)
BILIRUB UR-MCNC: NEGATIVE — SIGNIFICANT CHANGE UP
BLD GP AB SCN SERPL QL: NEGATIVE — SIGNIFICANT CHANGE UP
BUN SERPL-MCNC: 36 MG/DL — HIGH (ref 7–23)
CALCIUM SERPL-MCNC: 10.8 MG/DL — HIGH (ref 8.4–10.5)
CHLORIDE SERPL-SCNC: 100 MMOL/L — SIGNIFICANT CHANGE UP (ref 96–108)
CO2 SERPL-SCNC: 30 MMOL/L — SIGNIFICANT CHANGE UP (ref 22–31)
COLOR SPEC: SIGNIFICANT CHANGE UP
CREAT SERPL-MCNC: 1.31 MG/DL — HIGH (ref 0.5–1.3)
DIFF PNL FLD: NEGATIVE — SIGNIFICANT CHANGE UP
EOSINOPHIL # BLD AUTO: 0.2 K/UL — SIGNIFICANT CHANGE UP (ref 0–0.5)
EOSINOPHIL NFR BLD AUTO: 1.9 % — SIGNIFICANT CHANGE UP (ref 0–6)
GAS PNL BLDV: SIGNIFICANT CHANGE UP
GLUCOSE SERPL-MCNC: 157 MG/DL — HIGH (ref 70–99)
GLUCOSE UR QL: NEGATIVE — SIGNIFICANT CHANGE UP
HCT VFR BLD CALC: 51 % — HIGH (ref 39–50)
HGB BLD-MCNC: 16.7 G/DL — SIGNIFICANT CHANGE UP (ref 13–17)
INR BLD: 1.01 RATIO — SIGNIFICANT CHANGE UP (ref 0.88–1.16)
KETONES UR-MCNC: NEGATIVE — SIGNIFICANT CHANGE UP
LEUKOCYTE ESTERASE UR-ACNC: NEGATIVE — SIGNIFICANT CHANGE UP
LIDOCAIN IGE QN: 24 U/L — SIGNIFICANT CHANGE UP (ref 7–60)
LYMPHOCYTES # BLD AUTO: 1.1 K/UL — SIGNIFICANT CHANGE UP (ref 1–3.3)
LYMPHOCYTES # BLD AUTO: 12.7 % — LOW (ref 13–44)
MCHC RBC-ENTMCNC: 28.7 PG — SIGNIFICANT CHANGE UP (ref 27–34)
MCHC RBC-ENTMCNC: 32.8 GM/DL — SIGNIFICANT CHANGE UP (ref 32–36)
MCV RBC AUTO: 87.5 FL — SIGNIFICANT CHANGE UP (ref 80–100)
MONOCYTES # BLD AUTO: 0.5 K/UL — SIGNIFICANT CHANGE UP (ref 0–0.9)
MONOCYTES NFR BLD AUTO: 6.3 % — SIGNIFICANT CHANGE UP (ref 2–14)
NEUTROPHILS # BLD AUTO: 6.5 K/UL — SIGNIFICANT CHANGE UP (ref 1.8–7.4)
NEUTROPHILS NFR BLD AUTO: 78.6 % — HIGH (ref 43–77)
NITRITE UR-MCNC: NEGATIVE — SIGNIFICANT CHANGE UP
PH UR: 7.5 — SIGNIFICANT CHANGE UP (ref 5–8)
PLATELET # BLD AUTO: 151 K/UL — SIGNIFICANT CHANGE UP (ref 150–400)
POTASSIUM SERPL-MCNC: 4.3 MMOL/L — SIGNIFICANT CHANGE UP (ref 3.5–5.3)
POTASSIUM SERPL-SCNC: 4.3 MMOL/L — SIGNIFICANT CHANGE UP (ref 3.5–5.3)
PROT SERPL-MCNC: 6.9 G/DL — SIGNIFICANT CHANGE UP (ref 6–8.3)
PROT UR-MCNC: 30 MG/DL
PROTHROM AB SERPL-ACNC: 10.9 SEC — SIGNIFICANT CHANGE UP (ref 9.8–12.7)
RBC # BLD: 5.82 M/UL — HIGH (ref 4.2–5.8)
RBC # FLD: 12.3 % — SIGNIFICANT CHANGE UP (ref 10.3–14.5)
RH IG SCN BLD-IMP: POSITIVE — SIGNIFICANT CHANGE UP
RH IG SCN BLD-IMP: POSITIVE — SIGNIFICANT CHANGE UP
SODIUM SERPL-SCNC: 142 MMOL/L — SIGNIFICANT CHANGE UP (ref 135–145)
SP GR SPEC: 1.01 — SIGNIFICANT CHANGE UP (ref 1.01–1.02)
TROPONIN T SERPL-MCNC: <0.01 NG/ML — SIGNIFICANT CHANGE UP (ref 0–0.06)
UROBILINOGEN FLD QL: NEGATIVE — SIGNIFICANT CHANGE UP
WBC # BLD: 8.3 K/UL — SIGNIFICANT CHANGE UP (ref 3.8–10.5)
WBC # FLD AUTO: 8.3 K/UL — SIGNIFICANT CHANGE UP (ref 3.8–10.5)
WBC UR QL: SIGNIFICANT CHANGE UP /HPF (ref 0–5)

## 2017-06-22 PROCEDURE — 86850 RBC ANTIBODY SCREEN: CPT

## 2017-06-22 PROCEDURE — 86901 BLOOD TYPING SEROLOGIC RH(D): CPT

## 2017-06-22 PROCEDURE — 74174 CTA ABD&PLVS W/CONTRAST: CPT

## 2017-06-22 PROCEDURE — 71275 CT ANGIOGRAPHY CHEST: CPT | Mod: 26

## 2017-06-22 PROCEDURE — 82248 BILIRUBIN DIRECT: CPT

## 2017-06-22 PROCEDURE — 99284 EMERGENCY DEPT VISIT MOD MDM: CPT | Mod: 25

## 2017-06-22 PROCEDURE — 96375 TX/PRO/DX INJ NEW DRUG ADDON: CPT | Mod: XU

## 2017-06-22 PROCEDURE — 96374 THER/PROPH/DIAG INJ IV PUSH: CPT | Mod: XU

## 2017-06-22 PROCEDURE — 82247 BILIRUBIN TOTAL: CPT

## 2017-06-22 PROCEDURE — 74174 CTA ABD&PLVS W/CONTRAST: CPT | Mod: 26

## 2017-06-22 PROCEDURE — 76705 ECHO EXAM OF ABDOMEN: CPT

## 2017-06-22 PROCEDURE — 86900 BLOOD TYPING SEROLOGIC ABO: CPT

## 2017-06-22 PROCEDURE — 82553 CREATINE MB FRACTION: CPT

## 2017-06-22 PROCEDURE — 82803 BLOOD GASES ANY COMBINATION: CPT

## 2017-06-22 PROCEDURE — 93005 ELECTROCARDIOGRAM TRACING: CPT

## 2017-06-22 PROCEDURE — 85014 HEMATOCRIT: CPT

## 2017-06-22 PROCEDURE — 82947 ASSAY GLUCOSE BLOOD QUANT: CPT

## 2017-06-22 PROCEDURE — 82330 ASSAY OF CALCIUM: CPT

## 2017-06-22 PROCEDURE — 80053 COMPREHEN METABOLIC PANEL: CPT

## 2017-06-22 PROCEDURE — 83605 ASSAY OF LACTIC ACID: CPT

## 2017-06-22 PROCEDURE — 71275 CT ANGIOGRAPHY CHEST: CPT

## 2017-06-22 PROCEDURE — 85027 COMPLETE CBC AUTOMATED: CPT

## 2017-06-22 PROCEDURE — 84484 ASSAY OF TROPONIN QUANT: CPT

## 2017-06-22 PROCEDURE — 81001 URINALYSIS AUTO W/SCOPE: CPT

## 2017-06-22 PROCEDURE — 83690 ASSAY OF LIPASE: CPT

## 2017-06-22 PROCEDURE — 82435 ASSAY OF BLOOD CHLORIDE: CPT

## 2017-06-22 PROCEDURE — 85730 THROMBOPLASTIN TIME PARTIAL: CPT

## 2017-06-22 PROCEDURE — 76705 ECHO EXAM OF ABDOMEN: CPT | Mod: 26,RT

## 2017-06-22 PROCEDURE — 84132 ASSAY OF SERUM POTASSIUM: CPT

## 2017-06-22 PROCEDURE — 82550 ASSAY OF CK (CPK): CPT

## 2017-06-22 PROCEDURE — 85610 PROTHROMBIN TIME: CPT

## 2017-06-22 PROCEDURE — 84295 ASSAY OF SERUM SODIUM: CPT

## 2017-06-22 RX ORDER — METOPROLOL TARTRATE 50 MG
5 TABLET ORAL ONCE
Qty: 0 | Refills: 0 | Status: COMPLETED | OUTPATIENT
Start: 2017-06-22 | End: 2017-06-22

## 2017-06-22 RX ORDER — METOPROLOL TARTRATE 50 MG
100 TABLET ORAL ONCE
Qty: 0 | Refills: 0 | Status: COMPLETED | OUTPATIENT
Start: 2017-06-22 | End: 2017-06-22

## 2017-06-22 RX ORDER — SODIUM CHLORIDE 9 MG/ML
1000 INJECTION INTRAMUSCULAR; INTRAVENOUS; SUBCUTANEOUS ONCE
Qty: 0 | Refills: 0 | Status: COMPLETED | OUTPATIENT
Start: 2017-06-22 | End: 2017-06-22

## 2017-06-22 RX ORDER — ACETAMINOPHEN 500 MG
1000 TABLET ORAL ONCE
Qty: 0 | Refills: 0 | Status: COMPLETED | OUTPATIENT
Start: 2017-06-22 | End: 2017-06-22

## 2017-06-22 RX ADMIN — Medication 100 MILLIGRAM(S): at 07:24

## 2017-06-22 RX ADMIN — Medication 400 MILLIGRAM(S): at 02:25

## 2017-06-22 RX ADMIN — Medication 1000 MILLIGRAM(S): at 06:05

## 2017-06-22 RX ADMIN — SODIUM CHLORIDE 1000 MILLILITER(S): 9 INJECTION INTRAMUSCULAR; INTRAVENOUS; SUBCUTANEOUS at 07:25

## 2017-06-22 RX ADMIN — Medication 5 MILLIGRAM(S): at 02:20

## 2017-06-22 NOTE — ED ADULT NURSE REASSESSMENT NOTE - NS ED NURSE REASSESS COMMENT FT1
0705 Report received from night nurse. awaiting Ultrasound results. Medicated as ordered. A&Ox3. c/o generalized abd pain, less than earlier. abd soft non TTP. color pink. skin W&D. lungs clear. Voided 600cc clear yellow urine. IVl intact without sx of infilt LACF

## 2017-06-22 NOTE — ED PROVIDER NOTE - CARE PLAN
Instructions for follow-up, activity and diet:	Avoid fatty and spicy foods  Take maalox/pepcid over the counter as directed on bottle for abdominal discomfort  Remain well hydrated  See you primary doctor in 1-2 days  See general surgery in 1-2 weeks to be evaluated for removal of gall bladder.  Return to the emergency department for worsening pain, nausea/vomiting, fever, yellowing of skin or eyes, confusion, or if you have any new or changing symptoms or concerns Principal Discharge DX:	Cholelithiasis  Instructions for follow-up, activity and diet:	Avoid fatty and spicy foods  Take maalox/pepcid over the counter as directed on bottle for abdominal discomfort  Remain well hydrated  See you primary doctor in 1-2 days  See general surgery in 1-2 weeks to be evaluated for removal of gall bladder.  Return to the emergency department for worsening pain, nausea/vomiting, fever, yellowing of skin or eyes, confusion, or if you have any new or changing symptoms or concerns  Secondary Diagnosis:	Pancreatic cyst  Secondary Diagnosis:	Hypertension

## 2017-06-22 NOTE — ED ADULT NURSE REASSESSMENT NOTE - NS ED NURSE REASSESS COMMENT FT1
0825 BP remains elevated Dr Vogel and ER resident aware. Denies HA or dizziness. Okay to discharge home per Esther. Pt will take his usual meds at home today as advised by Esther

## 2017-06-22 NOTE — ED PROVIDER NOTE - PLAN OF CARE
Avoid fatty and spicy foods  Take maalox/pepcid over the counter as directed on bottle for abdominal discomfort  Remain well hydrated  See you primary doctor in 1-2 days  See general surgery in 1-2 weeks to be evaluated for removal of gall bladder.  Return to the emergency department for worsening pain, nausea/vomiting, fever, yellowing of skin or eyes, confusion, or if you have any new or changing symptoms or concerns

## 2017-06-22 NOTE — ED PROVIDER NOTE - OBJECTIVE STATEMENT
67yo male p/w abdominal pain. history of aortic dissection s/p repair, ckd, htn, pw/ abdominal pain since 3pm, acut onset, epigastric radiates to back, took gasx w/o relief. Now 8/10. Denies nausea/vomiting, chest pain, shorntess of breath, dysuria. last bm yesterday, no flatus today. 67yo male p/w abdominal pain. history of aortic dissection s/p repair, ckd, htn, pw/ abdominal pain since 3pm, acute onset, epigastric radiates to back, took gasx w/o relief. Now 8/10. Denies nausea/vomiting, chest pain, shorntess of breath, dysuria. last bm yesterday, no flatus today. CTsurg: Dr. Quintanilla PMD: Brett Gayle 67yo male p/w abdominal pain. history of aortic dissection s/p repair, ckd, htn, pw/ abdominal pain since 3pm, acute onset, epigastric radiates to back, took gasx w/o relief. Now 8/10. Denies nausea/vomiting, chest pain, shorntess of breath, dysuria. last bm yesterday, no flatus today. CTsurg: Dr. Quintanilla PMD: Brett Gayle; 67yo male p/w abdominal pain. history of aortic dissection s/p repair, ckd, htn, pw/ abdominal pain since 3pm, acute onset, epigastric radiates to back, took gasx w/o relief. Now 8/10. Denies nausea/vomiting, chest pain, shorntess of breath, dysuria. last bm yesterday, no flatus today. CTsurg: Dr. Quintanilla PMD: Brett Gayle; Pt. takes aspirin, metoprolol, amlodipine, denies missed medications. 65yo male p/w abdominal pain. history of aortic dissection s/p repair, ckd, htn, pw/ abdominal pain since 3pm, acute onset, epigastric radiates to back, took gasx w/o relief. Now 8/10. Denies nausea/vomiting, chest pain, shortness of breath, dysuria. last bm yesterday, no flatus today. CTsurg: Dr. Quintanilla PMD: Brett Gayle; Pt. takes aspirin, metoprolol, amlodipine, denies missed medications.

## 2017-06-26 ENCOUNTER — APPOINTMENT (OUTPATIENT)
Dept: SURGERY | Facility: CLINIC | Age: 67
End: 2017-06-26

## 2017-06-26 VITALS
WEIGHT: 185 LBS | SYSTOLIC BLOOD PRESSURE: 156 MMHG | TEMPERATURE: 97.5 F | HEIGHT: 70 IN | BODY MASS INDEX: 26.48 KG/M2 | DIASTOLIC BLOOD PRESSURE: 101 MMHG | HEART RATE: 54 BPM

## 2017-06-29 ENCOUNTER — APPOINTMENT (OUTPATIENT)
Dept: CARDIOLOGY | Facility: CLINIC | Age: 67
End: 2017-06-29

## 2017-06-29 VITALS
DIASTOLIC BLOOD PRESSURE: 97 MMHG | BODY MASS INDEX: 26.48 KG/M2 | HEART RATE: 56 BPM | WEIGHT: 185 LBS | RESPIRATION RATE: 16 BRPM | OXYGEN SATURATION: 99 % | HEIGHT: 70 IN | SYSTOLIC BLOOD PRESSURE: 154 MMHG

## 2017-06-30 ENCOUNTER — OUTPATIENT (OUTPATIENT)
Dept: OUTPATIENT SERVICES | Facility: HOSPITAL | Age: 67
LOS: 1 days | End: 2017-06-30

## 2017-06-30 VITALS
HEART RATE: 60 BPM | WEIGHT: 190.04 LBS | TEMPERATURE: 97 F | SYSTOLIC BLOOD PRESSURE: 160 MMHG | DIASTOLIC BLOOD PRESSURE: 100 MMHG | HEIGHT: 68 IN | RESPIRATION RATE: 16 BRPM

## 2017-06-30 DIAGNOSIS — Z98.890 OTHER SPECIFIED POSTPROCEDURAL STATES: Chronic | ICD-10-CM

## 2017-06-30 DIAGNOSIS — K81.1 CHRONIC CHOLECYSTITIS: ICD-10-CM

## 2017-06-30 DIAGNOSIS — D03.9 MELANOMA IN SITU, UNSPECIFIED: Chronic | ICD-10-CM

## 2017-06-30 LAB
BUN SERPL-MCNC: 26 MG/DL — HIGH (ref 7–23)
CALCIUM SERPL-MCNC: 10.3 MG/DL — SIGNIFICANT CHANGE UP (ref 8.4–10.5)
CHLORIDE SERPL-SCNC: 99 MMOL/L — SIGNIFICANT CHANGE UP (ref 98–107)
CO2 SERPL-SCNC: 28 MMOL/L — SIGNIFICANT CHANGE UP (ref 22–31)
CREAT SERPL-MCNC: 1.18 MG/DL — SIGNIFICANT CHANGE UP (ref 0.5–1.3)
GLUCOSE SERPL-MCNC: 85 MG/DL — SIGNIFICANT CHANGE UP (ref 70–99)
HBA1C BLD-MCNC: 5 % — SIGNIFICANT CHANGE UP (ref 4–5.6)
HCT VFR BLD CALC: 48.4 % — SIGNIFICANT CHANGE UP (ref 39–50)
HGB BLD-MCNC: 16.6 G/DL — SIGNIFICANT CHANGE UP (ref 13–17)
MCHC RBC-ENTMCNC: 29.9 PG — SIGNIFICANT CHANGE UP (ref 27–34)
MCHC RBC-ENTMCNC: 34.3 % — SIGNIFICANT CHANGE UP (ref 32–36)
MCV RBC AUTO: 87.2 FL — SIGNIFICANT CHANGE UP (ref 80–100)
NRBC # FLD: 0 — SIGNIFICANT CHANGE UP
PLATELET # BLD AUTO: 180 K/UL — SIGNIFICANT CHANGE UP (ref 150–400)
PMV BLD: 11.9 FL — SIGNIFICANT CHANGE UP (ref 7–13)
POTASSIUM SERPL-MCNC: 3.9 MMOL/L — SIGNIFICANT CHANGE UP (ref 3.5–5.3)
POTASSIUM SERPL-SCNC: 3.9 MMOL/L — SIGNIFICANT CHANGE UP (ref 3.5–5.3)
RBC # BLD: 5.55 M/UL — SIGNIFICANT CHANGE UP (ref 4.2–5.8)
RBC # FLD: 13.2 % — SIGNIFICANT CHANGE UP (ref 10.3–14.5)
SODIUM SERPL-SCNC: 143 MMOL/L — SIGNIFICANT CHANGE UP (ref 135–145)
WBC # BLD: 6.36 K/UL — SIGNIFICANT CHANGE UP (ref 3.8–10.5)
WBC # FLD AUTO: 6.36 K/UL — SIGNIFICANT CHANGE UP (ref 3.8–10.5)

## 2017-06-30 NOTE — H&P PST ADULT - NSANTHOSAYNRD_GEN_A_CORE
No. DANNY screening performed.  STOP BANG Legend: 0-2 = LOW Risk; 3-4 = INTERMEDIATE Risk; 5-8 = HIGH Risk

## 2017-06-30 NOTE — H&P PST ADULT - NEGATIVE BREAST SYMPTOMS
no breast lump L/no breast tenderness L/no nipple discharge R/no breast tenderness R/no breast lump R/no nipple discharge L

## 2017-06-30 NOTE — H&P PST ADULT - NEGATIVE NEUROLOGICAL SYMPTOMS
no headache/no syncope/no facial palsy/no focal seizures/no loss of sensation/no vertigo/no confusion/no tremors/no generalized seizures/no loss of consciousness

## 2017-06-30 NOTE — H&P PST ADULT - RS GEN PE MLT RESP DETAILS PC
no wheezes/breath sounds equal/no chest wall tenderness/no rhonchi/clear to auscultation bilaterally/no subcutaneous emphysema/no rales/respirations non-labored/airway patent/good air movement/no intercostal retractions

## 2017-06-30 NOTE — H&P PST ADULT - PSH
Melanoma in situ  Excision of menaloma of right arm; 2014  S/P aortic dissection repair History of tonsillectomy and adenoidectomy  at age 3  Melanoma in situ  Excision of menaloma of right arm; 2014  S/P aortic dissection repair

## 2017-06-30 NOTE — H&P PST ADULT - NEGATIVE ENMT SYMPTOMS
no ear pain/no nasal discharge/no nasal obstruction/no hearing difficulty/no post-nasal discharge/no nose bleeds/no recurrent cold sores/no nasal congestion/no vertigo/no sinus symptoms/no tinnitus

## 2017-06-30 NOTE — H&P PST ADULT - NEGATIVE CARDIOVASCULAR SYMPTOMS
no chest pain/no palpitations/no paroxysmal nocturnal dyspnea/no claudication/no orthopnea/no dyspnea on exertion/no peripheral edema

## 2017-06-30 NOTE — H&P PST ADULT - NEGATIVE OPHTHALMOLOGIC SYMPTOMS
no pain L/no irritation R/no loss of vision R/no diplopia/no lacrimation L/no pain R/no lacrimation R/no blurred vision L/no loss of vision L/no irritation L/no photophobia/no discharge L no pain L/no blurred vision L/no discharge R/no diplopia/no scleral injection L/no irritation R/no lacrimation R/no pain R/no irritation L/no lacrimation L/no loss of vision L/no discharge L/no loss of vision R/no photophobia

## 2017-06-30 NOTE — H&P PST ADULT - NEGATIVE GENERAL GENITOURINARY SYMPTOMS
no hematuria/no urine discoloration/no flank pain L/no urinary hesitancy/no dysuria/no nocturia/no flank pain R/no renal colic/normal urinary frequency/no bladder infections

## 2017-06-30 NOTE — H&P PST ADULT - PMH
Anxiety    Aortic dissection, thoracic    Central vein occlusion of retina    GERD (gastroesophageal reflux disease)    Hypertension    Melanoma in situ  right arm; 2014

## 2017-06-30 NOTE — H&P PST ADULT - HISTORY OF PRESENT ILLNESS
65 y/o  male with PMH: HTN, Dyslipidemia, central retinal vein thrombosis, right; thoracic aortic dissection s/p repair in 2013 06/21/17 mid abdominal pain radiated to back progressively worsening as the day goes by, seen at [facility] ED , Ct scan of chest ,US of abd/ pelvis showed gallstones. Pt was then referred to surgeon for further evaluation . Now scheduled for laparoscopic cholecystectomy on 07/06/17. 65 y/o  male with PMH: HTN, Dyslipidemia, central retinal vein thrombosis, right; thoracic aortic dissection s/p repair in 2013 presents to PST for pre op evaluation with hx of mid abdominal pain radiated to back progressively worsening as the day goes by on 06/21/17. Pt was seen at [facility] ED , S/P Ct scan of chest ,US of abd/ pelvis showed gallstones. Pt was then referred to surgeon for further evaluation. Now scheduled for laparoscopic cholecystectomy on 07/06/17.

## 2017-07-06 ENCOUNTER — TRANSCRIPTION ENCOUNTER (OUTPATIENT)
Age: 67
End: 2017-07-06

## 2017-07-06 ENCOUNTER — OUTPATIENT (OUTPATIENT)
Dept: INPATIENT UNIT | Facility: HOSPITAL | Age: 67
LOS: 1 days | Discharge: HOME | End: 2017-07-06

## 2017-07-06 VITALS
SYSTOLIC BLOOD PRESSURE: 135 MMHG | WEIGHT: 184.97 LBS | HEIGHT: 70 IN | OXYGEN SATURATION: 98 % | HEART RATE: 68 BPM | DIASTOLIC BLOOD PRESSURE: 89 MMHG | TEMPERATURE: 97 F | RESPIRATION RATE: 18 BRPM

## 2017-07-06 DIAGNOSIS — Z98.890 OTHER SPECIFIED POSTPROCEDURAL STATES: Chronic | ICD-10-CM

## 2017-07-06 DIAGNOSIS — D03.9 MELANOMA IN SITU, UNSPECIFIED: Chronic | ICD-10-CM

## 2017-07-06 DIAGNOSIS — Z90.49 ACQUIRED ABSENCE OF OTHER SPECIFIED PARTS OF DIGESTIVE TRACT: ICD-10-CM

## 2017-07-06 LAB
ALBUMIN SERPL ELPH-MCNC: 3 G/DL — LOW (ref 3.3–5)
ALP SERPL-CCNC: 69 U/L — SIGNIFICANT CHANGE UP (ref 40–120)
ALT FLD-CCNC: 49 U/L — HIGH (ref 4–41)
AST SERPL-CCNC: 48 U/L — HIGH (ref 4–40)
BILIRUB SERPL-MCNC: 0.6 MG/DL — SIGNIFICANT CHANGE UP (ref 0.2–1.2)
BUN SERPL-MCNC: 25 MG/DL — HIGH (ref 7–23)
CALCIUM SERPL-MCNC: 9 MG/DL — SIGNIFICANT CHANGE UP (ref 8.4–10.5)
CHLORIDE SERPL-SCNC: 102 MMOL/L — SIGNIFICANT CHANGE UP (ref 98–107)
CO2 SERPL-SCNC: 23 MMOL/L — SIGNIFICANT CHANGE UP (ref 22–31)
CREAT SERPL-MCNC: 0.96 MG/DL — SIGNIFICANT CHANGE UP (ref 0.5–1.3)
GLUCOSE SERPL-MCNC: 119 MG/DL — HIGH (ref 70–99)
HCT VFR BLD CALC: 39.8 % — SIGNIFICANT CHANGE UP (ref 39–50)
HGB BLD-MCNC: 13.8 G/DL — SIGNIFICANT CHANGE UP (ref 13–17)
MCHC RBC-ENTMCNC: 30 PG — SIGNIFICANT CHANGE UP (ref 27–34)
MCHC RBC-ENTMCNC: 34.7 % — SIGNIFICANT CHANGE UP (ref 32–36)
MCV RBC AUTO: 86.5 FL — SIGNIFICANT CHANGE UP (ref 80–100)
NRBC # FLD: 0 — SIGNIFICANT CHANGE UP
PLATELET # BLD AUTO: 142 K/UL — LOW (ref 150–400)
PMV BLD: 11.9 FL — SIGNIFICANT CHANGE UP (ref 7–13)
POTASSIUM SERPL-MCNC: 4.3 MMOL/L — SIGNIFICANT CHANGE UP (ref 3.5–5.3)
POTASSIUM SERPL-SCNC: 4.3 MMOL/L — SIGNIFICANT CHANGE UP (ref 3.5–5.3)
PROT SERPL-MCNC: 4.9 G/DL — LOW (ref 6–8.3)
RBC # BLD: 4.6 M/UL — SIGNIFICANT CHANGE UP (ref 4.2–5.8)
RBC # FLD: 12.9 % — SIGNIFICANT CHANGE UP (ref 10.3–14.5)
SODIUM SERPL-SCNC: 141 MMOL/L — SIGNIFICANT CHANGE UP (ref 135–145)
WBC # BLD: 10.46 K/UL — SIGNIFICANT CHANGE UP (ref 3.8–10.5)
WBC # FLD AUTO: 10.46 K/UL — SIGNIFICANT CHANGE UP (ref 3.8–10.5)

## 2017-07-06 RX ORDER — OXYCODONE HYDROCHLORIDE 5 MG/1
5 TABLET ORAL EVERY 6 HOURS
Qty: 0 | Refills: 0 | Status: DISCONTINUED | OUTPATIENT
Start: 2017-07-06 | End: 2017-07-07

## 2017-07-06 RX ORDER — SERTRALINE 25 MG/1
100 TABLET, FILM COATED ORAL AT BEDTIME
Qty: 0 | Refills: 0 | Status: DISCONTINUED | OUTPATIENT
Start: 2017-07-06 | End: 2017-07-07

## 2017-07-06 RX ORDER — AMLODIPINE BESYLATE 2.5 MG/1
10 TABLET ORAL DAILY
Qty: 0 | Refills: 0 | Status: DISCONTINUED | OUTPATIENT
Start: 2017-07-06 | End: 2017-07-07

## 2017-07-06 RX ORDER — SODIUM CHLORIDE 9 MG/ML
1000 INJECTION, SOLUTION INTRAVENOUS
Qty: 0 | Refills: 0 | Status: DISCONTINUED | OUTPATIENT
Start: 2017-07-06 | End: 2017-07-06

## 2017-07-06 RX ORDER — FAMOTIDINE 10 MG/ML
20 INJECTION INTRAVENOUS
Qty: 0 | Refills: 0 | Status: DISCONTINUED | OUTPATIENT
Start: 2017-07-06 | End: 2017-07-07

## 2017-07-06 RX ORDER — CIPROFLOXACIN LACTATE 400MG/40ML
500 VIAL (ML) INTRAVENOUS EVERY 12 HOURS
Qty: 0 | Refills: 0 | Status: DISCONTINUED | OUTPATIENT
Start: 2017-07-06 | End: 2017-07-07

## 2017-07-06 RX ORDER — GABAPENTIN 400 MG/1
100 CAPSULE ORAL THREE TIMES A DAY
Qty: 0 | Refills: 0 | Status: DISCONTINUED | OUTPATIENT
Start: 2017-07-06 | End: 2017-07-07

## 2017-07-06 RX ORDER — OXYCODONE HYDROCHLORIDE 5 MG/1
10 TABLET ORAL EVERY 6 HOURS
Qty: 0 | Refills: 0 | Status: DISCONTINUED | OUTPATIENT
Start: 2017-07-06 | End: 2017-07-07

## 2017-07-06 RX ORDER — SODIUM CHLORIDE 9 MG/ML
1000 INJECTION, SOLUTION INTRAVENOUS
Qty: 0 | Refills: 0 | Status: DISCONTINUED | OUTPATIENT
Start: 2017-07-06 | End: 2017-07-07

## 2017-07-06 RX ORDER — HEPARIN SODIUM 5000 [USP'U]/ML
5000 INJECTION INTRAVENOUS; SUBCUTANEOUS EVERY 8 HOURS
Qty: 0 | Refills: 0 | Status: DISCONTINUED | OUTPATIENT
Start: 2017-07-06 | End: 2017-07-07

## 2017-07-06 RX ORDER — ACETAMINOPHEN 500 MG
650 TABLET ORAL EVERY 6 HOURS
Qty: 0 | Refills: 0 | Status: DISCONTINUED | OUTPATIENT
Start: 2017-07-06 | End: 2017-07-07

## 2017-07-06 RX ADMIN — SERTRALINE 100 MILLIGRAM(S): 25 TABLET, FILM COATED ORAL at 22:50

## 2017-07-06 RX ADMIN — AMLODIPINE BESYLATE 10 MILLIGRAM(S): 2.5 TABLET ORAL at 22:50

## 2017-07-06 RX ADMIN — SODIUM CHLORIDE 75 MILLILITER(S): 9 INJECTION, SOLUTION INTRAVENOUS at 22:49

## 2017-07-06 RX ADMIN — Medication 500 MILLIGRAM(S): at 18:30

## 2017-07-06 RX ADMIN — FAMOTIDINE 20 MILLIGRAM(S): 10 INJECTION INTRAVENOUS at 22:20

## 2017-07-06 RX ADMIN — GABAPENTIN 100 MILLIGRAM(S): 400 CAPSULE ORAL at 22:51

## 2017-07-07 ENCOUNTER — TRANSCRIPTION ENCOUNTER (OUTPATIENT)
Age: 67
End: 2017-07-07

## 2017-07-07 VITALS — DIASTOLIC BLOOD PRESSURE: 86 MMHG | HEART RATE: 79 BPM | SYSTOLIC BLOOD PRESSURE: 123 MMHG

## 2017-07-07 LAB
BLD GP AB SCN SERPL QL: NEGATIVE — SIGNIFICANT CHANGE UP
BUN SERPL-MCNC: 28 MG/DL — HIGH (ref 7–23)
CALCIUM SERPL-MCNC: 9.9 MG/DL — SIGNIFICANT CHANGE UP (ref 8.4–10.5)
CHLORIDE SERPL-SCNC: 98 MMOL/L — SIGNIFICANT CHANGE UP (ref 98–107)
CO2 SERPL-SCNC: 27 MMOL/L — SIGNIFICANT CHANGE UP (ref 22–31)
CREAT SERPL-MCNC: 1.19 MG/DL — SIGNIFICANT CHANGE UP (ref 0.5–1.3)
GLUCOSE SERPL-MCNC: 100 MG/DL — HIGH (ref 70–99)
HCT VFR BLD CALC: 42.8 % — SIGNIFICANT CHANGE UP (ref 39–50)
HGB BLD-MCNC: 14.7 G/DL — SIGNIFICANT CHANGE UP (ref 13–17)
MAGNESIUM SERPL-MCNC: 1.8 MG/DL — SIGNIFICANT CHANGE UP (ref 1.6–2.6)
MCHC RBC-ENTMCNC: 29.4 PG — SIGNIFICANT CHANGE UP (ref 27–34)
MCHC RBC-ENTMCNC: 34.3 % — SIGNIFICANT CHANGE UP (ref 32–36)
MCV RBC AUTO: 85.6 FL — SIGNIFICANT CHANGE UP (ref 80–100)
NRBC # FLD: 0 — SIGNIFICANT CHANGE UP
PHOSPHATE SERPL-MCNC: 2.9 MG/DL — SIGNIFICANT CHANGE UP (ref 2.5–4.5)
PLATELET # BLD AUTO: 131 K/UL — LOW (ref 150–400)
PMV BLD: 12.3 FL — SIGNIFICANT CHANGE UP (ref 7–13)
POTASSIUM SERPL-MCNC: 3.9 MMOL/L — SIGNIFICANT CHANGE UP (ref 3.5–5.3)
POTASSIUM SERPL-SCNC: 3.9 MMOL/L — SIGNIFICANT CHANGE UP (ref 3.5–5.3)
RBC # BLD: 5 M/UL — SIGNIFICANT CHANGE UP (ref 4.2–5.8)
RBC # FLD: 13.2 % — SIGNIFICANT CHANGE UP (ref 10.3–14.5)
RH IG SCN BLD-IMP: POSITIVE — SIGNIFICANT CHANGE UP
SODIUM SERPL-SCNC: 139 MMOL/L — SIGNIFICANT CHANGE UP (ref 135–145)
WBC # BLD: 10 K/UL — SIGNIFICANT CHANGE UP (ref 3.8–10.5)
WBC # FLD AUTO: 10 K/UL — SIGNIFICANT CHANGE UP (ref 3.8–10.5)

## 2017-07-07 RX ORDER — OXYCODONE HYDROCHLORIDE 5 MG/1
1 TABLET ORAL
Qty: 12 | Refills: 0 | OUTPATIENT
Start: 2017-07-07 | End: 2017-07-10

## 2017-07-07 RX ORDER — METOPROLOL TARTRATE 50 MG
100 TABLET ORAL ONCE
Qty: 0 | Refills: 0 | Status: COMPLETED | OUTPATIENT
Start: 2017-07-07 | End: 2017-07-07

## 2017-07-07 RX ORDER — MOXIFLOXACIN HYDROCHLORIDE TABLETS, 400 MG 400 MG/1
1 TABLET, FILM COATED ORAL
Qty: 10 | Refills: 0 | OUTPATIENT
Start: 2017-07-07 | End: 2017-07-12

## 2017-07-07 RX ORDER — GABAPENTIN 400 MG/1
2 CAPSULE ORAL
Qty: 0 | Refills: 0 | DISCHARGE
Start: 2017-07-07

## 2017-07-07 RX ORDER — ACETAMINOPHEN 500 MG
2 TABLET ORAL
Qty: 0 | Refills: 0 | COMMUNITY
Start: 2017-07-07

## 2017-07-07 RX ORDER — GABAPENTIN 400 MG/1
1 CAPSULE ORAL
Qty: 0 | Refills: 0 | COMMUNITY
Start: 2017-07-07

## 2017-07-07 RX ADMIN — FAMOTIDINE 20 MILLIGRAM(S): 10 INJECTION INTRAVENOUS at 05:16

## 2017-07-07 RX ADMIN — Medication 500 MILLIGRAM(S): at 05:16

## 2017-07-07 RX ADMIN — SODIUM CHLORIDE 75 MILLILITER(S): 9 INJECTION, SOLUTION INTRAVENOUS at 02:17

## 2017-07-07 RX ADMIN — GABAPENTIN 100 MILLIGRAM(S): 400 CAPSULE ORAL at 05:16

## 2017-07-07 RX ADMIN — Medication 100 MILLIGRAM(S): at 12:29

## 2017-07-07 RX ADMIN — Medication 650 MILLIGRAM(S): at 02:47

## 2017-07-07 RX ADMIN — HEPARIN SODIUM 5000 UNIT(S): 5000 INJECTION INTRAVENOUS; SUBCUTANEOUS at 05:17

## 2017-07-07 RX ADMIN — SODIUM CHLORIDE 75 MILLILITER(S): 9 INJECTION, SOLUTION INTRAVENOUS at 05:16

## 2017-07-07 RX ADMIN — AMLODIPINE BESYLATE 10 MILLIGRAM(S): 2.5 TABLET ORAL at 05:16

## 2017-07-07 RX ADMIN — Medication 650 MILLIGRAM(S): at 02:17

## 2017-07-07 NOTE — DISCHARGE NOTE ADULT - CARE PLAN
Goal:	S/P lap Mariam  Instructions for follow-up, activity and diet:	WOUND CARE:  Please keep incisions clean and dry. Please do not Scrub or rub incisions. Do not use lotion or powder on incisions. You will be discharged with ARACELI drains. You will need to empty them and record outputs accurately. This will be taught to you by the nursing staff. Please do not remove the ARACELI drains. They will be removed in the office. Please bring to the office accurate records of output.   BATHING: Please do not submerge wound underwater. You may shower and/or sponge bathe.  ACTIVITY: No heavy lifting or straining. Otherwise, you may return to your usual level of physical activity. If you are taking narcotic pain medication (such as Percocet) DO NOT drive a car, operate machinery or make important decisions.  DIET: Return to your usual diet.  NOTIFY YOUR SURGEON IF: You have any bleeding that does not stop, any pus draining from your wound(s), any fever (over 100.4 F) or chills, persistent nausea/vomiting, persistent diarrhea, or if your pain is not controlled on your discharge pain medications.  FOLLOW-UP: Please follow up with your primary care physician in one week regarding your hospitalization. Please follow-up with your surgeon, next Tuesday following discharge- please call to schedule an appointment.

## 2017-07-07 NOTE — DISCHARGE NOTE ADULT - MEDICATION SUMMARY - MEDICATIONS TO TAKE
I will START or STAY ON the medications listed below when I get home from the hospital:    aspirin 81 mg oral delayed release tablet  -- 1 tab(s) by mouth once a day  -- Indication: For cad    acetaminophen-oxycodone 325 mg-5 mg oral tablet  -- 1 tab(s) by mouth every 6 hours MDD:4  -- Caution federal law prohibits the transfer of this drug to any person other  than the person for whom it was prescribed.  May cause drowsiness.  Alcohol may intensify this effect.  Use care when operating dangerous machinery.  This prescription cannot be refilled.  This product contains acetaminophen.  Do not use  with any other product containing acetaminophen to prevent possible liver damage.  Using more of this medication than prescribed may cause serious breathing problems.    -- Indication: For pain    gabapentin 100 mg oral capsule  -- 1 cap(s) by mouth 3 times a day  -- Indication: For pain    Zoloft 100 mg oral tablet  -- 1 tab(s) by mouth once a day (at bedtime)  -- Indication: For depression    Niaspan  mg oral tablet, extended release  -- 1 tab(s) by mouth once a day (at bedtime)  -- Indication: For hld    metoprolol tartrate 100 mg oral tablet  -- 1.5 tab(s) by mouth once daily in the morning and 1 tab(s) by mouth once daily at night  -- Indication: For htn    amLODIPine 10 mg oral tablet  -- 1 tab(s) by mouth once a day  -- Indication: For htn    famotidine 40 mg oral tablet  -- 1 tab(s) by mouth once a day (at bedtime)  -- Indication: For gerd    magnesium oxide 400 mg (241.3 mg elemental magnesium) oral tablet  -- 1 tab(s) by mouth once a day  -- Indication: For vitamin    potassium chloride 20 mEq oral tablet, extended release  -- 1 tab(s) by mouth daily  -- Indication: For vitamin    Cipro 500 mg oral tablet  -- 1 tab(s) by mouth every 12 hours MDD:1000 mg/day  -- Indication: For Antibiotic    calcitriol 0.25 mcg oral capsule  -- 1 cap(s) by mouth once a day  -- Indication: For vitamin

## 2017-07-07 NOTE — DISCHARGE NOTE ADULT - CARE PROVIDER_API CALL
Balaji Sahu), ColonRectal Surgery; Surgery  [address]  [place] [address]  Phone: [phone]  Fax: [phone]

## 2017-07-07 NOTE — DISCHARGE NOTE ADULT - PATIENT PORTAL LINK FT
“You can access the FollowHealth Patient Portal, offered by [facility], by registering with the following website: [email or web address]”

## 2017-07-07 NOTE — PROGRESS NOTE ADULT - ATTENDING COMMENTS
Pt examined.  Agree with above.  - D/C home with PO Cipro/5d  - Keep ARACELI  _ F/U with Dr. Sahu on Tuesday to remove drain

## 2017-07-07 NOTE — DISCHARGE NOTE ADULT - HOSPITAL COURSE
66yMale present for scheduled surgery he had cholecyctectomy, liver biopsy and drain placement on 7/6/2017 post op admitted to surgery for monitoring. During hospital course patients diet was slowly advanced as tolerated.  At this time, pt is tolerating a regular diet, ambulating and voiding.  Pt is stable for discharge as per the attending at this time.

## 2017-07-07 NOTE — PROGRESS NOTE ADULT - SUBJECTIVE AND OBJECTIVE BOX
INTERVAL HPI/OVERNIGHT EVENTS: none    STATUS POST:  lap cholecystectomy and liver biopsy    POST OPERATIVE DAY #: 1    SUBJECTIVE: Patient seen and examined at bedside, patient without complaints.     Vital Signs Last 24 Hrs  T(C): 36.9 (07 Jul 2017 05:12), Max: 36.9 (07 Jul 2017 05:12)  T(F): 98.5 (07 Jul 2017 05:12), Max: 98.5 (07 Jul 2017 05:12)  HR: 81 (07 Jul 2017 05:12) (51 - 88)  BP: 138/89 (07 Jul 2017 05:12) (129/68 - 154/89)  BP(mean): --  RR: 18 (07 Jul 2017 05:12) (12 - 18)  SpO2: 95% (07 Jul 2017 05:12) (95% - 100%)  I&O's Detail    06 Jul 2017 07:01  -  07 Jul 2017 07:00  --------------------------------------------------------  IN:    lactated ringers.: 900 mL    Oral Fluid: 300 mL  Total IN: 1200 mL    OUT:    Drain: 63 mL    Voided: 1050 mL  Total OUT: 1113 mL    Total NET: 87 mL    MEDICATIONS  (STANDING):  amLODIPine   Tablet 10 milliGRAM(s) Oral daily  famotidine    Tablet 20 milliGRAM(s) Oral two times a day  sertraline 100 milliGRAM(s) Oral at bedtime  gabapentin 100 milliGRAM(s) Oral three times a day  ciprofloxacin     Tablet 500 milliGRAM(s) Oral every 12 hours  heparin  Injectable 5000 Unit(s) SubCutaneous every 8 hours    MEDICATIONS  (PRN):  acetaminophen   Tablet. 650 milliGRAM(s) Oral every 6 hours PRN Mild Pain (1 - 3)  oxyCODONE    IR 5 milliGRAM(s) Oral every 6 hours PRN Moderate Pain (4 - 6)  oxyCODONE    IR 10 milliGRAM(s) Oral every 6 hours PRN Severe Pain (7 - 10)      Physical Exam  General: A&Ox3, NAD  Abdominal: Soft nontender nondistended rasta drain sanguinous     LABS:                        14.7   10.00 )-----------( 131      ( 07 Jul 2017 06:45 )             42.8     07-07    139  |  98  |  28<H>  ----------------------------<  100<H>  3.9   |  27  |  1.19    Ca    9.9      07 Jul 2017 06:45  Phos  2.9     07-07  Mg     1.8     07-07    TPro  4.9<L>  /  Alb  3.0<L>  /  TBili  0.6  /  DBili  x   /  AST  48<H>  /  ALT  49<H>  /  AlkPhos  69  07-06        ABO Interpretation: A (07-07-17 @ 06:33)

## 2017-07-07 NOTE — DISCHARGE NOTE ADULT - PLAN OF CARE
S/P lap Mariam WOUND CARE:  Please keep incisions clean and dry. Please do not Scrub or rub incisions. Do not use lotion or powder on incisions. You will be discharged with ARACELI drains. You will need to empty them and record outputs accurately. This will be taught to you by the nursing staff. Please do not remove the ARACELI drains. They will be removed in the office. Please bring to the office accurate records of output.   BATHING: Please do not submerge wound underwater. You may shower and/or sponge bathe.  ACTIVITY: No heavy lifting or straining. Otherwise, you may return to your usual level of physical activity. If you are taking narcotic pain medication (such as Percocet) DO NOT drive a car, operate machinery or make important decisions.  DIET: Return to your usual diet.  NOTIFY YOUR SURGEON IF: You have any bleeding that does not stop, any pus draining from your wound(s), any fever (over 100.4 F) or chills, persistent nausea/vomiting, persistent diarrhea, or if your pain is not controlled on your discharge pain medications.  FOLLOW-UP: Please follow up with your primary care physician in one week regarding your hospitalization. Please follow-up with your surgeon, next Tuesday following discharge- please call to schedule an appointment.

## 2017-07-07 NOTE — PROGRESS NOTE ADULT - ASSESSMENT
Patient is a 66y Male s/p  lap cholecystectomy and liver biopsy pod 1 doing well, crit stable  -Reg diet  -Pain control as needed  -OOB  -DC home after visted by attending  -Discussed with a team

## 2017-08-25 ENCOUNTER — RX RENEWAL (OUTPATIENT)
Age: 67
End: 2017-08-25

## 2017-09-14 ENCOUNTER — APPOINTMENT (OUTPATIENT)
Dept: CARDIOLOGY | Facility: CLINIC | Age: 67
End: 2017-09-14
Payer: MEDICARE

## 2017-09-14 ENCOUNTER — NON-APPOINTMENT (OUTPATIENT)
Age: 67
End: 2017-09-14

## 2017-09-14 VITALS
HEART RATE: 57 BPM | OXYGEN SATURATION: 98 % | RESPIRATION RATE: 16 BRPM | HEIGHT: 70 IN | WEIGHT: 183 LBS | DIASTOLIC BLOOD PRESSURE: 97 MMHG | BODY MASS INDEX: 26.2 KG/M2 | SYSTOLIC BLOOD PRESSURE: 157 MMHG

## 2017-09-14 PROCEDURE — 93000 ELECTROCARDIOGRAM COMPLETE: CPT

## 2017-09-14 PROCEDURE — 36415 COLL VENOUS BLD VENIPUNCTURE: CPT

## 2017-09-14 PROCEDURE — 99215 OFFICE O/P EST HI 40 MIN: CPT

## 2017-09-15 LAB
ALBUMIN SERPL ELPH-MCNC: 4.4 G/DL
ALP BLD-CCNC: 87 U/L
ALT SERPL-CCNC: 27 U/L
ANION GAP SERPL CALC-SCNC: 16 MMOL/L
AST SERPL-CCNC: 23 U/L
BASOPHILS # BLD AUTO: 0.03 K/UL
BASOPHILS NFR BLD AUTO: 0.4 %
BILIRUB SERPL-MCNC: 0.9 MG/DL
BUN SERPL-MCNC: 27 MG/DL
CALCIUM SERPL-MCNC: 10.9 MG/DL
CHLORIDE SERPL-SCNC: 100 MMOL/L
CHOLEST SERPL-MCNC: 168 MG/DL
CHOLEST/HDLC SERPL: 6.5 RATIO
CO2 SERPL-SCNC: 27 MMOL/L
CREAT SERPL-MCNC: 1.3 MG/DL
EOSINOPHIL # BLD AUTO: 0.22 K/UL
EOSINOPHIL NFR BLD AUTO: 3.2 %
GLUCOSE SERPL-MCNC: 78 MG/DL
HBA1C MFR BLD HPLC: 5 %
HCT VFR BLD CALC: 47.9 %
HDLC SERPL-MCNC: 26 MG/DL
HGB BLD-MCNC: 16.5 G/DL
IMM GRANULOCYTES NFR BLD AUTO: 0.1 %
LDLC SERPL CALC-MCNC: 74 MG/DL
LYMPHOCYTES # BLD AUTO: 1.62 K/UL
LYMPHOCYTES NFR BLD AUTO: 23.5 %
MAGNESIUM SERPL-MCNC: 2.2 MG/DL
MAN DIFF?: NORMAL
MCHC RBC-ENTMCNC: 29.1 PG
MCHC RBC-ENTMCNC: 34.4 GM/DL
MCV RBC AUTO: 84.5 FL
MONOCYTES # BLD AUTO: 0.71 K/UL
MONOCYTES NFR BLD AUTO: 10.3 %
NEUTROPHILS # BLD AUTO: 4.29 K/UL
NEUTROPHILS NFR BLD AUTO: 62.5 %
PLATELET # BLD AUTO: 187 K/UL
POTASSIUM SERPL-SCNC: 3.5 MMOL/L
PROT SERPL-MCNC: 7.1 G/DL
RBC # BLD: 5.67 M/UL
RBC # FLD: 13.7 %
SODIUM SERPL-SCNC: 143 MMOL/L
TRIGL SERPL-MCNC: 339 MG/DL
TSH SERPL-ACNC: 3.29 UIU/ML
WBC # FLD AUTO: 6.88 K/UL

## 2017-09-18 ENCOUNTER — APPOINTMENT (OUTPATIENT)
Dept: CV DIAGNOSTICS | Facility: HOSPITAL | Age: 67
End: 2017-09-18
Payer: MEDICARE

## 2017-09-18 ENCOUNTER — OUTPATIENT (OUTPATIENT)
Dept: OUTPATIENT SERVICES | Facility: HOSPITAL | Age: 67
LOS: 1 days | End: 2017-09-18

## 2017-09-18 DIAGNOSIS — Z98.890 OTHER SPECIFIED POSTPROCEDURAL STATES: Chronic | ICD-10-CM

## 2017-09-18 DIAGNOSIS — D03.9 MELANOMA IN SITU, UNSPECIFIED: Chronic | ICD-10-CM

## 2017-09-18 DIAGNOSIS — R06.02 SHORTNESS OF BREATH: ICD-10-CM

## 2017-09-18 PROCEDURE — 93018 CV STRESS TEST I&R ONLY: CPT | Mod: GC

## 2017-09-18 PROCEDURE — 78452 HT MUSCLE IMAGE SPECT MULT: CPT | Mod: 26

## 2017-09-18 PROCEDURE — 93016 CV STRESS TEST SUPVJ ONLY: CPT | Mod: GC

## 2017-09-25 ENCOUNTER — RX RENEWAL (OUTPATIENT)
Age: 67
End: 2017-09-25

## 2017-09-27 ENCOUNTER — TRANSCRIPTION ENCOUNTER (OUTPATIENT)
Age: 67
End: 2017-09-27

## 2017-10-04 ENCOUNTER — FORM ENCOUNTER (OUTPATIENT)
Age: 67
End: 2017-10-04

## 2017-10-05 ENCOUNTER — OUTPATIENT (OUTPATIENT)
Dept: OUTPATIENT SERVICES | Facility: HOSPITAL | Age: 67
LOS: 1 days | End: 2017-10-05

## 2017-10-05 ENCOUNTER — APPOINTMENT (OUTPATIENT)
Dept: RADIOLOGY | Facility: HOSPITAL | Age: 67
End: 2017-10-05

## 2017-10-05 ENCOUNTER — APPOINTMENT (OUTPATIENT)
Dept: NUCLEAR MEDICINE | Facility: HOSPITAL | Age: 67
End: 2017-10-05

## 2017-10-05 ENCOUNTER — OUTPATIENT (OUTPATIENT)
Dept: OUTPATIENT SERVICES | Facility: HOSPITAL | Age: 67
LOS: 1 days | End: 2017-10-05
Payer: MEDICARE

## 2017-10-05 DIAGNOSIS — D03.9 MELANOMA IN SITU, UNSPECIFIED: Chronic | ICD-10-CM

## 2017-10-05 DIAGNOSIS — Z98.890 OTHER SPECIFIED POSTPROCEDURAL STATES: Chronic | ICD-10-CM

## 2017-10-05 DIAGNOSIS — R06.00 DYSPNEA, UNSPECIFIED: ICD-10-CM

## 2017-10-05 PROCEDURE — 78582 LUNG VENTILAT&PERFUS IMAGING: CPT | Mod: 26,GC

## 2017-10-05 PROCEDURE — 71020: CPT | Mod: 26

## 2017-10-20 ENCOUNTER — RX RENEWAL (OUTPATIENT)
Age: 67
End: 2017-10-20

## 2017-11-08 ENCOUNTER — MEDICATION RENEWAL (OUTPATIENT)
Age: 67
End: 2017-11-08

## 2017-11-27 ENCOUNTER — TRANSCRIPTION ENCOUNTER (OUTPATIENT)
Age: 67
End: 2017-11-27

## 2018-02-21 ENCOUNTER — RX RENEWAL (OUTPATIENT)
Age: 68
End: 2018-02-21

## 2018-03-15 ENCOUNTER — APPOINTMENT (OUTPATIENT)
Dept: CARDIOLOGY | Facility: CLINIC | Age: 68
End: 2018-03-15
Payer: MEDICARE

## 2018-03-15 ENCOUNTER — NON-APPOINTMENT (OUTPATIENT)
Age: 68
End: 2018-03-15

## 2018-03-15 VITALS
DIASTOLIC BLOOD PRESSURE: 98 MMHG | RESPIRATION RATE: 16 BRPM | BODY MASS INDEX: 26.92 KG/M2 | OXYGEN SATURATION: 99 % | SYSTOLIC BLOOD PRESSURE: 159 MMHG | HEART RATE: 58 BPM | HEIGHT: 70 IN | WEIGHT: 188 LBS

## 2018-03-15 DIAGNOSIS — K86.2 CYST OF PANCREAS: ICD-10-CM

## 2018-03-15 PROCEDURE — 99215 OFFICE O/P EST HI 40 MIN: CPT

## 2018-03-15 PROCEDURE — 93000 ELECTROCARDIOGRAM COMPLETE: CPT

## 2018-03-15 PROCEDURE — 36415 COLL VENOUS BLD VENIPUNCTURE: CPT

## 2018-03-16 LAB
ALBUMIN SERPL ELPH-MCNC: 4.2 G/DL
ALP BLD-CCNC: 77 U/L
ALT SERPL-CCNC: 25 U/L
ANION GAP SERPL CALC-SCNC: 15 MMOL/L
AST SERPL-CCNC: 21 U/L
BASOPHILS # BLD AUTO: 0.03 K/UL
BASOPHILS NFR BLD AUTO: 0.5 %
BILIRUB SERPL-MCNC: 1 MG/DL
BUN SERPL-MCNC: 33 MG/DL
CALCIUM SERPL-MCNC: 10.9 MG/DL
CHLORIDE SERPL-SCNC: 101 MMOL/L
CHOLEST SERPL-MCNC: 148 MG/DL
CHOLEST/HDLC SERPL: 4.6 RATIO
CO2 SERPL-SCNC: 26 MMOL/L
CREAT SERPL-MCNC: 1.38 MG/DL
EOSINOPHIL # BLD AUTO: 0.16 K/UL
EOSINOPHIL NFR BLD AUTO: 2.6 %
GLUCOSE SERPL-MCNC: 86 MG/DL
HBA1C MFR BLD HPLC: 5.1 %
HCT VFR BLD CALC: 50.1 %
HDLC SERPL-MCNC: 32 MG/DL
HGB BLD-MCNC: 16.9 G/DL
IMM GRANULOCYTES NFR BLD AUTO: 0.3 %
LDLC SERPL CALC-MCNC: 70 MG/DL
LYMPHOCYTES # BLD AUTO: 1.49 K/UL
LYMPHOCYTES NFR BLD AUTO: 23.8 %
MAN DIFF?: NORMAL
MCHC RBC-ENTMCNC: 29.8 PG
MCHC RBC-ENTMCNC: 33.7 GM/DL
MCV RBC AUTO: 88.4 FL
MONOCYTES # BLD AUTO: 0.48 K/UL
MONOCYTES NFR BLD AUTO: 7.7 %
NEUTROPHILS # BLD AUTO: 4.07 K/UL
NEUTROPHILS NFR BLD AUTO: 65.1 %
PLATELET # BLD AUTO: 191 K/UL
POTASSIUM SERPL-SCNC: 3.7 MMOL/L
PROT SERPL-MCNC: 7.4 G/DL
RBC # BLD: 5.67 M/UL
RBC # FLD: 13.4 %
SODIUM SERPL-SCNC: 142 MMOL/L
TRIGL SERPL-MCNC: 230 MG/DL
TSH SERPL-ACNC: 3.44 UIU/ML
WBC # FLD AUTO: 6.25 K/UL

## 2018-03-19 ENCOUNTER — APPOINTMENT (OUTPATIENT)
Dept: MRI IMAGING | Facility: CLINIC | Age: 68
End: 2018-03-19

## 2018-03-19 ENCOUNTER — OUTPATIENT (OUTPATIENT)
Dept: OUTPATIENT SERVICES | Facility: HOSPITAL | Age: 68
LOS: 1 days | End: 2018-03-19
Payer: MEDICARE

## 2018-03-19 DIAGNOSIS — Z00.8 ENCOUNTER FOR OTHER GENERAL EXAMINATION: ICD-10-CM

## 2018-03-19 DIAGNOSIS — D03.9 MELANOMA IN SITU, UNSPECIFIED: Chronic | ICD-10-CM

## 2018-03-19 DIAGNOSIS — Z98.890 OTHER SPECIFIED POSTPROCEDURAL STATES: Chronic | ICD-10-CM

## 2018-03-19 PROCEDURE — 74185 MRA ABD W OR W/O CNTRST: CPT | Mod: 26

## 2018-03-19 PROCEDURE — C8911: CPT

## 2018-03-19 PROCEDURE — 71555 MRI ANGIO CHEST W OR W/O DYE: CPT | Mod: 26

## 2018-03-19 PROCEDURE — A9585: CPT

## 2018-03-19 PROCEDURE — C8902: CPT

## 2018-03-22 ENCOUNTER — OUTPATIENT (OUTPATIENT)
Dept: OUTPATIENT SERVICES | Facility: HOSPITAL | Age: 68
LOS: 1 days | End: 2018-03-22
Payer: MEDICARE

## 2018-03-22 ENCOUNTER — APPOINTMENT (OUTPATIENT)
Dept: ULTRASOUND IMAGING | Facility: CLINIC | Age: 68
End: 2018-03-22
Payer: MEDICARE

## 2018-03-22 DIAGNOSIS — Z98.890 OTHER SPECIFIED POSTPROCEDURAL STATES: Chronic | ICD-10-CM

## 2018-03-22 DIAGNOSIS — Z00.8 ENCOUNTER FOR OTHER GENERAL EXAMINATION: ICD-10-CM

## 2018-03-22 DIAGNOSIS — D03.9 MELANOMA IN SITU, UNSPECIFIED: Chronic | ICD-10-CM

## 2018-03-22 PROCEDURE — 76700 US EXAM ABDOM COMPLETE: CPT

## 2018-03-22 PROCEDURE — 76700 US EXAM ABDOM COMPLETE: CPT | Mod: 26

## 2018-03-23 ENCOUNTER — OUTPATIENT (OUTPATIENT)
Dept: OUTPATIENT SERVICES | Facility: HOSPITAL | Age: 68
LOS: 1 days | End: 2018-03-23

## 2018-03-23 ENCOUNTER — APPOINTMENT (OUTPATIENT)
Dept: CV DIAGNOSITCS | Facility: HOSPITAL | Age: 68
End: 2018-03-23
Payer: MEDICARE

## 2018-03-23 DIAGNOSIS — D03.9 MELANOMA IN SITU, UNSPECIFIED: Chronic | ICD-10-CM

## 2018-03-23 DIAGNOSIS — Z98.890 OTHER SPECIFIED POSTPROCEDURAL STATES: Chronic | ICD-10-CM

## 2018-03-23 DIAGNOSIS — I71.00 DISSECTION OF UNSPECIFIED SITE OF AORTA: ICD-10-CM

## 2018-03-23 PROCEDURE — 93306 TTE W/DOPPLER COMPLETE: CPT | Mod: 26

## 2018-03-26 ENCOUNTER — APPOINTMENT (OUTPATIENT)
Dept: CV DIAGNOSITCS | Facility: HOSPITAL | Age: 68
End: 2018-03-26

## 2018-04-04 ENCOUNTER — RX RENEWAL (OUTPATIENT)
Age: 68
End: 2018-04-04

## 2018-04-13 ENCOUNTER — APPOINTMENT (OUTPATIENT)
Dept: CARDIOTHORACIC SURGERY | Facility: CLINIC | Age: 68
End: 2018-04-13
Payer: MEDICARE

## 2018-04-13 VITALS
WEIGHT: 186 LBS | DIASTOLIC BLOOD PRESSURE: 90 MMHG | HEART RATE: 62 BPM | HEIGHT: 70 IN | BODY MASS INDEX: 26.63 KG/M2 | SYSTOLIC BLOOD PRESSURE: 148 MMHG | OXYGEN SATURATION: 97 % | RESPIRATION RATE: 16 BRPM

## 2018-04-13 PROCEDURE — 99205 OFFICE O/P NEW HI 60 MIN: CPT

## 2018-04-19 RX ORDER — METOPROLOL TARTRATE 75 MG/1
TABLET, FILM COATED ORAL
Refills: 0 | Status: DISCONTINUED | COMMUNITY
End: 2018-04-19

## 2018-05-30 ENCOUNTER — RX RENEWAL (OUTPATIENT)
Age: 68
End: 2018-05-30

## 2018-06-12 ENCOUNTER — MEDICATION RENEWAL (OUTPATIENT)
Age: 68
End: 2018-06-12

## 2018-08-13 ENCOUNTER — RX RENEWAL (OUTPATIENT)
Age: 68
End: 2018-08-13

## 2018-08-27 ENCOUNTER — RX RENEWAL (OUTPATIENT)
Age: 68
End: 2018-08-27

## 2018-09-02 ENCOUNTER — MEDICATION RENEWAL (OUTPATIENT)
Age: 68
End: 2018-09-02

## 2018-09-04 ENCOUNTER — RX RENEWAL (OUTPATIENT)
Age: 68
End: 2018-09-04

## 2018-09-12 PROBLEM — K21.9 GASTRO-ESOPHAGEAL REFLUX DISEASE WITHOUT ESOPHAGITIS: Chronic | Status: ACTIVE | Noted: 2017-06-30

## 2018-09-12 PROBLEM — I10 ESSENTIAL (PRIMARY) HYPERTENSION: Chronic | Status: ACTIVE | Noted: 2017-04-26

## 2018-09-12 PROBLEM — F41.9 ANXIETY DISORDER, UNSPECIFIED: Chronic | Status: ACTIVE | Noted: 2017-06-30

## 2018-09-12 PROBLEM — D03.9 MELANOMA IN SITU, UNSPECIFIED: Chronic | Status: ACTIVE | Noted: 2017-06-30

## 2018-09-12 PROBLEM — I71.01 DISSECTION OF THORACIC AORTA: Chronic | Status: ACTIVE | Noted: 2017-04-26

## 2018-09-14 ENCOUNTER — APPOINTMENT (OUTPATIENT)
Dept: ORTHOPEDIC SURGERY | Facility: CLINIC | Age: 68
End: 2018-09-14
Payer: MEDICARE

## 2018-09-14 VITALS
HEIGHT: 69 IN | SYSTOLIC BLOOD PRESSURE: 151 MMHG | WEIGHT: 185 LBS | DIASTOLIC BLOOD PRESSURE: 96 MMHG | BODY MASS INDEX: 27.4 KG/M2 | HEART RATE: 61 BPM

## 2018-09-14 PROCEDURE — 20605 DRAIN/INJ JOINT/BURSA W/O US: CPT | Mod: RT

## 2018-09-14 PROCEDURE — 73080 X-RAY EXAM OF ELBOW: CPT | Mod: RT

## 2018-09-14 PROCEDURE — 99214 OFFICE O/P EST MOD 30 MIN: CPT | Mod: 25

## 2018-09-20 ENCOUNTER — APPOINTMENT (OUTPATIENT)
Dept: CARDIOLOGY | Facility: CLINIC | Age: 68
End: 2018-09-20
Payer: MEDICARE

## 2018-09-20 ENCOUNTER — NON-APPOINTMENT (OUTPATIENT)
Age: 68
End: 2018-09-20

## 2018-09-20 VITALS
BODY MASS INDEX: 27.4 KG/M2 | OXYGEN SATURATION: 99 % | DIASTOLIC BLOOD PRESSURE: 103 MMHG | HEART RATE: 58 BPM | SYSTOLIC BLOOD PRESSURE: 180 MMHG | WEIGHT: 185 LBS | HEIGHT: 69 IN | RESPIRATION RATE: 16 BRPM

## 2018-09-20 PROCEDURE — 93000 ELECTROCARDIOGRAM COMPLETE: CPT

## 2018-09-20 PROCEDURE — 36415 COLL VENOUS BLD VENIPUNCTURE: CPT

## 2018-09-20 PROCEDURE — 99215 OFFICE O/P EST HI 40 MIN: CPT

## 2018-09-21 LAB
ALBUMIN SERPL ELPH-MCNC: 4.5 G/DL
ALP BLD-CCNC: 85 U/L
ALT SERPL-CCNC: 27 U/L
ANION GAP SERPL CALC-SCNC: 16 MMOL/L
AST SERPL-CCNC: 24 U/L
BASOPHILS # BLD AUTO: 0.05 K/UL
BASOPHILS NFR BLD AUTO: 0.8 %
BILIRUB SERPL-MCNC: 0.6 MG/DL
BUN SERPL-MCNC: 33 MG/DL
CALCIUM SERPL-MCNC: 10.7 MG/DL
CHLORIDE SERPL-SCNC: 101 MMOL/L
CHOLEST SERPL-MCNC: 152 MG/DL
CHOLEST/HDLC SERPL: 4.5 RATIO
CO2 SERPL-SCNC: 26 MMOL/L
CREAT SERPL-MCNC: 1.48 MG/DL
EOSINOPHIL # BLD AUTO: 0.17 K/UL
EOSINOPHIL NFR BLD AUTO: 2.7 %
GLUCOSE SERPL-MCNC: 75 MG/DL
HBA1C MFR BLD HPLC: 5.3 %
HCT VFR BLD CALC: 49.1 %
HDLC SERPL-MCNC: 34 MG/DL
HGB BLD-MCNC: 16.3 G/DL
IMM GRANULOCYTES NFR BLD AUTO: 0.3 %
LDLC SERPL CALC-MCNC: 77 MG/DL
LYMPHOCYTES # BLD AUTO: 1.96 K/UL
LYMPHOCYTES NFR BLD AUTO: 31 %
MAGNESIUM SERPL-MCNC: 2.3 MG/DL
MAN DIFF?: NORMAL
MCHC RBC-ENTMCNC: 29.2 PG
MCHC RBC-ENTMCNC: 33.2 GM/DL
MCV RBC AUTO: 87.8 FL
MONOCYTES # BLD AUTO: 0.66 K/UL
MONOCYTES NFR BLD AUTO: 10.4 %
NEUTROPHILS # BLD AUTO: 3.46 K/UL
NEUTROPHILS NFR BLD AUTO: 54.8 %
PLATELET # BLD AUTO: 153 K/UL
POTASSIUM SERPL-SCNC: 4.1 MMOL/L
PROT SERPL-MCNC: 6.7 G/DL
RBC # BLD: 5.59 M/UL
RBC # FLD: 13.7 %
SODIUM SERPL-SCNC: 143 MMOL/L
TRIGL SERPL-MCNC: 205 MG/DL
TSH SERPL-ACNC: 3.41 UIU/ML
WBC # FLD AUTO: 6.32 K/UL

## 2018-09-23 ENCOUNTER — TRANSCRIPTION ENCOUNTER (OUTPATIENT)
Age: 68
End: 2018-09-23

## 2018-10-03 NOTE — H&P ADULT. - LAB RESULTS AND INTERPRETATION
Patient is here with Mother for rash  Vitals:    10/03/18 1747   BP: 120/78   Pulse: 68   Resp: 12   Temp: (!) 97 2 °F (36 2 °C)       Assessment/Plan:  Gauge was seen today for rash  Diagnoses and all orders for this visit:    Dermatitis  -     cephalexin (KEFLEX) 500 mg capsule; Take 1 capsule (500 mg total) by mouth 3 (three) times a day for 10 days  -     mupirocin (BACTROBAN) 2 % ointment; Apply topically 3 (three) times a day  -     Fungal culture  -     Wound culture and Gram stain  -     HSV TYPE 1,2 DNA PCR; Future        Patient ID: Carlotta Baker is a 13 y o  male    HPI:  The patient reports that about 10 days ago he fell while playing football  Had a brush burn on the right arm  The lesions never healed, developed a rash which spread to the other parts of the arm  The patient admits to slight itch at times, denies pain, denies burning and stinging sensation  No meds used  No known sick contact , no other unusual exposure  Review of Systems:  Review of Systems   Constitutional: Negative  Negative for chills, fatigue, fever and unexpected weight change  HENT: Negative  Negative for ear pain, hearing loss, nosebleeds and sore throat  Eyes: Negative  Negative for pain, discharge and itching  Respiratory: Negative  Negative for cough, shortness of breath and wheezing  Cardiovascular: Negative  Negative for chest pain and palpitations  Gastrointestinal: Negative  Negative for abdominal pain, blood in stool, constipation, diarrhea, nausea and vomiting  Endocrine: Negative  Genitourinary: Negative  Negative for dysuria, genital sores and testicular pain  Musculoskeletal: Negative  Negative for back pain, joint swelling, myalgias and neck pain  Skin: Positive for rash  Neurological: Negative  Negative for dizziness, weakness, numbness and headaches  Hematological: Negative  Psychiatric/Behavioral: Negative    Negative for behavioral problems, confusion, sleep disturbance and suicidal ideas  The patient is not nervous/anxious  All other systems reviewed and are negative  Physical Exam:  Physical Exam   Constitutional: He is oriented to person, place, and time  Vital signs are normal  He appears well-developed and well-nourished  No distress  HENT:   Head: Normocephalic and atraumatic  Right Ear: Tympanic membrane and external ear normal  No drainage  Left Ear: Tympanic membrane and external ear normal  No drainage  Nose: Nose normal  No nasal deformity  Mouth/Throat: Oropharynx is clear and moist  No oropharyngeal exudate, posterior oropharyngeal edema, posterior oropharyngeal erythema or tonsillar abscesses  Eyes: Conjunctivae and lids are normal  Right eye exhibits no discharge  Left eye exhibits no discharge  No scleral icterus  Neck: Normal range of motion  Neck supple  Cardiovascular: Normal rate, regular rhythm, S1 normal, S2 normal and normal heart sounds  No murmur heard  Pulmonary/Chest: Effort normal and breath sounds normal  No respiratory distress  Abdominal: Soft  Normal appearance and bowel sounds are normal  There is no hepatosplenomegaly, splenomegaly or hepatomegaly  There is no tenderness  Genitourinary: Testes normal    Lymphadenopathy:        Head (right side): No tonsillar adenopathy present  Head (left side): No tonsillar adenopathy present  Neurological: He is alert and oriented to person, place, and time  He exhibits normal muscle tone  He displays a negative Romberg sign  Coordination normal    Skin: Skin is warm and dry  Rash is not pustular  He is not diaphoretic  On the ulnar aspect of the right forearm there are several patches of beefy red erythematous raised skin, some lesions with some silvery scales, one scabbed  The palpation of the lesions is not painful, the patient admits to slight itch  There are no blisters, no oozing, no bleeding    There is no surrounding erythema or induration, no streaking  No regional lymphadenopathy  No rash in any other areas  Mild inflammatory acne on the face  Psychiatric: He has a normal mood and affect  His behavior is normal  Judgment and thought content normal    Nursing note and vitals reviewed  Follow Up: Return in about 4 years (around 10/3/2022) for Recheck  Visit Discussion:  Discussed with the mother and the patient diagnostic possibilities  Sent of bacterial, fungal cultures, herpes PCR  Will start with Keflex one tablet 3 times a day  Keep the lesions dry and clean  Apply Bactroban three to 4 times a day  Return to office in 3-5 days for follow-up or sooner if not better  Patient Instructions     Impetigo   AMBULATORY CARE:   Impetigo  is a skin infection caused by bacteria  The infection can cause sores to form anywhere on your body  The sores develop watery or pus-filled blisters that break and form thick crusts  Impetigo is most common in children and spreads easily from person to person  Seek care immediately if:   · You have painful, red, warm skin around the blisters  · Your face is swollen  · You urinate less than usual or there is blood in your urine  Contact your healthcare provider if:   · You have a fever  · The sores become more red, swollen, warm, or tender  · The sores do not start to heal after 3 days of treatment  · You have questions or concerns about your condition or care  Treatment for impetigo  includes antibiotics to treat the bacterial infection  Antibiotics may be given as a pill or cream  Wash your skin and gently remove any crusts before you apply the antibiotic cream   Clean your sores safely:  Wash your skin sores with antibacterial soap and water  You may need to do this 2 to 3 times each day until the sores heal  If the area is crusted, gently wash the sores with gauze or a clean washcloth to remove the crust  Pat the area dry with a clean towel   Wash your hands, the washcloth, and the towel after you clean the area around the sores  Prevent the spread of impetigo:   · Avoid direct contact  You can spread impetigo if someone touches or uses something that touched your infected skin  You can also spread impetigo on your own body when you touch the area and then touch somewhere else  Keep the sores covered with gauze so you will not scratch or touch them  Keep your fingernails short  Your child may need to wear mittens so he does not scratch his sores  · Wash your hands often  Always wash your hands after you touch the infected area  Wash your hands before you touch food, your eyes, or other people  If no water is available, use an alcohol-based gel to clean your hands  · Wash household items  Do not share or reuse items that have come in contact with impetigo sores  Examples include bedding, towels, washcloths, and eating utensils  These items may be used again after they have been washed with hot water and soap  Return to work or school: You may return to work or school 48 hours after you start the antibiotic medicine  If your child has impetigo, tell his school or  center about the infection  Follow up with your healthcare provider as directed:  Write down your questions so you remember to ask them during your visits  © 2017 [employer] Information is for End User's use only and may not be sold, redistributed or otherwise used for commercial purposes  All illustrations and images included in CareNotes® are the copyrighted property of A [employer]  or Deepak Ramirez  The above information is an  only  It is not intended as medical advice for individual conditions or treatments  Talk to your doctor, nurse or pharmacist before following any medical regimen to see if it is safe and effective for you  Labs personally reviewed by me.  Polycythemia, unchanged from baseline labs  Hypercalcemia

## 2018-10-04 ENCOUNTER — APPOINTMENT (OUTPATIENT)
Dept: ORTHOPEDIC SURGERY | Facility: CLINIC | Age: 68
End: 2018-10-04
Payer: MEDICARE

## 2018-10-04 DIAGNOSIS — M25.421 EFFUSION, RIGHT ELBOW: ICD-10-CM

## 2018-10-04 PROCEDURE — 20605 DRAIN/INJ JOINT/BURSA W/O US: CPT | Mod: RT

## 2018-10-04 PROCEDURE — 99213 OFFICE O/P EST LOW 20 MIN: CPT | Mod: 25

## 2018-10-10 ENCOUNTER — TRANSCRIPTION ENCOUNTER (OUTPATIENT)
Age: 68
End: 2018-10-10

## 2018-10-25 ENCOUNTER — RX RENEWAL (OUTPATIENT)
Age: 68
End: 2018-10-25

## 2018-11-02 ENCOUNTER — RX RENEWAL (OUTPATIENT)
Age: 68
End: 2018-11-02

## 2019-01-09 ENCOUNTER — APPOINTMENT (OUTPATIENT)
Dept: ORTHOPEDIC SURGERY | Facility: CLINIC | Age: 69
End: 2019-01-09
Payer: MEDICARE

## 2019-01-09 VITALS — BODY MASS INDEX: 26.92 KG/M2 | WEIGHT: 188 LBS | HEIGHT: 70 IN

## 2019-01-09 PROCEDURE — 73564 X-RAY EXAM KNEE 4 OR MORE: CPT | Mod: 50

## 2019-01-09 PROCEDURE — 20610 DRAIN/INJ JOINT/BURSA W/O US: CPT | Mod: 50

## 2019-01-09 PROCEDURE — 99214 OFFICE O/P EST MOD 30 MIN: CPT | Mod: 25

## 2019-01-09 RX ORDER — HYALURONATE SODIUM 20 MG/2 ML
20 SYRINGE (ML) INTRAARTICULAR
Refills: 0 | Status: COMPLETED | OUTPATIENT
Start: 2019-01-09

## 2019-01-09 RX ADMIN — Medication 2 MG/2ML: at 00:00

## 2019-01-09 NOTE — PHYSICAL EXAM
[de-identified] : The patient appears well nourished  and in no apparent distress.  The patient is alert and oriented to person, place, and time.   Affect and mood appear normal.    The head is normocephalic and atraumatic.  The eyes reveal normal sclera and extra ocular muscles are intact.   The neck appears normal with no jugular venous distention or masses noted.   Skin shows normal turgor with no evidence of eczema or psoriasis.  No respiratory distress noted.  The patient ambulates with a normal gait.\par \par The right and left knees have normal range of motion.  There is some discomfort with terminal flexion. There is crepitus with range of motion. There is no joint line tenderness. There is a negative Nubia sign. The patient is a trace effusion in the left knee. There is no soft tissue swelling, warmth, or erythema.   There is a negative Lachman sign.  There is no instability to varus/valgus stress or anterior/posterior drawer.  There is normal strength in the in the quadriceps and hamstring muscles.  Sensation is intact to the lower estremity. There are normal pulses distally and good capillary refill. No edema or lymphadenopathy noted.  \par \par  [de-identified] : AP, lateral, tunnel, and merchant views of the right and left knee were obtained.  There is mild lateral joint line narrowing. There is some narrowing of the medial joint lines as well. There is patella and moderate to severe patellofemoral narrowing. The alignment of the knee is normal.  No fractures or dislocations are noted.

## 2019-01-09 NOTE — DISCUSSION/SUMMARY
[de-identified] : The patient has evidence of degenerative arthritis of the knees mostly in the patellofemoral joints. Patient had worsening symptoms over the last year. Due to his continued symptoms I recommended and performed the first Euflexxa injection to both knees. The risks benefits and alternative treatments were explained to the patient and he agreed to proceed with the viscosupplementation. I will see the patient back in one week for the second injection.

## 2019-01-09 NOTE — HISTORY OF PRESENT ILLNESS
[de-identified] : This patient presents for initial I. wish regarding bilateral knee pain. He states the pain is worse in the right knee than the left. Pain ongoing for a few years it has recently gotten worse. Patient notes pain going up and down the stairs. Patient states pain level IV/X. Patient denies swelling clicking locking or buckling. Currently not taking any pain medicine. Pain is improved with rest.

## 2019-01-16 ENCOUNTER — APPOINTMENT (OUTPATIENT)
Dept: ORTHOPEDIC SURGERY | Facility: CLINIC | Age: 69
End: 2019-01-16
Payer: MEDICARE

## 2019-01-16 ENCOUNTER — MEDICATION RENEWAL (OUTPATIENT)
Age: 69
End: 2019-01-16

## 2019-01-16 VITALS
HEIGHT: 70 IN | WEIGHT: 187 LBS | BODY MASS INDEX: 26.77 KG/M2 | DIASTOLIC BLOOD PRESSURE: 80 MMHG | SYSTOLIC BLOOD PRESSURE: 122 MMHG | HEART RATE: 61 BPM

## 2019-01-16 PROCEDURE — 20610 DRAIN/INJ JOINT/BURSA W/O US: CPT | Mod: 50

## 2019-01-16 RX ORDER — HYALURONATE SODIUM 20 MG/2 ML
20 SYRINGE (ML) INTRAARTICULAR
Refills: 0 | Status: COMPLETED | OUTPATIENT
Start: 2019-01-16

## 2019-01-16 RX ADMIN — Medication 2 MG/2ML: at 00:00

## 2019-01-16 NOTE — HISTORY OF PRESENT ILLNESS
[de-identified] : The patient presents with a second reflex injection to both knees. The patient is having improvement after the first injection. No complaints are noted.

## 2019-01-16 NOTE — PROCEDURE
[de-identified] : A 22gauge needle was sterilely placed onto the syringe of Euflexxa.  The right and left knees were then sterilely prepped with chlorhexidine and ethylene chloride spray was used as an anesthetic just prior to the injection.   The Euflexxa was injected into the knee joint just above and lateral to the patella into the suprapatellar pouch.  The patient tolerated the procedure well without difficulty.  The patient was given instructions on the use of ice and anti-inflammatories for post injection site soreness.  \par

## 2019-01-16 NOTE — PHYSICAL EXAM
[de-identified] : \par \par The right and left knees have normal range of motion.  There is some discomfort with terminal flexion. There is crepitus with range of motion. There is no joint line tenderness. There is a negative [place] sign. The patient is a trace effusion in the left knee. There is no soft tissue swelling, warmth, or erythema.   There is a negative Lachman sign.  There is no instability to varus/valgus stress or anterior/posterior drawer.  There is normal strength in the in the quadriceps and hamstring muscles.  Sensation is intact to the lower estremity. There are normal pulses distally and good capillary refill. No edema or lymphadenopathy noted.  \par \par

## 2019-01-16 NOTE — DISCUSSION/SUMMARY
[de-identified] : The injection was tolerated without difficulty. Instructions were given postinjection regarding ice and analgesics. I will see the patient back in one week for the next injection.

## 2019-01-17 ENCOUNTER — OTHER (OUTPATIENT)
Age: 69
End: 2019-01-17

## 2019-01-23 ENCOUNTER — APPOINTMENT (OUTPATIENT)
Dept: ORTHOPEDIC SURGERY | Facility: CLINIC | Age: 69
End: 2019-01-23
Payer: MEDICARE

## 2019-01-23 VITALS
DIASTOLIC BLOOD PRESSURE: 73 MMHG | SYSTOLIC BLOOD PRESSURE: 114 MMHG | HEIGHT: 70 IN | HEART RATE: 59 BPM | WEIGHT: 187 LBS | BODY MASS INDEX: 26.77 KG/M2

## 2019-01-23 PROCEDURE — 20610 DRAIN/INJ JOINT/BURSA W/O US: CPT | Mod: 50

## 2019-01-23 RX ORDER — HYALURONATE SODIUM 20 MG/2 ML
20 SYRINGE (ML) INTRAARTICULAR
Refills: 0 | Status: COMPLETED | OUTPATIENT
Start: 2019-01-23

## 2019-01-23 RX ADMIN — Medication 2 MG/2ML: at 00:00

## 2019-01-26 ENCOUNTER — MEDICATION RENEWAL (OUTPATIENT)
Age: 69
End: 2019-01-26

## 2019-01-30 ENCOUNTER — MEDICATION RENEWAL (OUTPATIENT)
Age: 69
End: 2019-01-30

## 2019-02-08 ENCOUNTER — RX RENEWAL (OUTPATIENT)
Age: 69
End: 2019-02-08

## 2019-02-15 ENCOUNTER — MEDICATION RENEWAL (OUTPATIENT)
Age: 69
End: 2019-02-15

## 2019-02-26 ENCOUNTER — OUTPATIENT (OUTPATIENT)
Dept: OUTPATIENT SERVICES | Facility: HOSPITAL | Age: 69
LOS: 1 days | End: 2019-02-26
Payer: MEDICARE

## 2019-02-26 ENCOUNTER — APPOINTMENT (OUTPATIENT)
Dept: MRI IMAGING | Facility: CLINIC | Age: 69
End: 2019-02-26
Payer: MEDICARE

## 2019-02-26 DIAGNOSIS — Z00.8 ENCOUNTER FOR OTHER GENERAL EXAMINATION: ICD-10-CM

## 2019-02-26 DIAGNOSIS — D03.9 MELANOMA IN SITU, UNSPECIFIED: Chronic | ICD-10-CM

## 2019-02-26 DIAGNOSIS — Z98.890 OTHER SPECIFIED POSTPROCEDURAL STATES: Chronic | ICD-10-CM

## 2019-02-26 PROCEDURE — A9585: CPT

## 2019-02-26 PROCEDURE — 71555 MRI ANGIO CHEST W OR W/O DYE: CPT | Mod: 26

## 2019-02-26 PROCEDURE — C8911: CPT

## 2019-02-28 ENCOUNTER — OUTPATIENT (OUTPATIENT)
Dept: OUTPATIENT SERVICES | Facility: HOSPITAL | Age: 69
LOS: 1 days | End: 2019-02-28
Payer: MEDICARE

## 2019-02-28 ENCOUNTER — APPOINTMENT (OUTPATIENT)
Dept: MRI IMAGING | Facility: CLINIC | Age: 69
End: 2019-02-28
Payer: MEDICARE

## 2019-02-28 DIAGNOSIS — Z98.890 OTHER SPECIFIED POSTPROCEDURAL STATES: Chronic | ICD-10-CM

## 2019-02-28 DIAGNOSIS — I71.00 DISSECTION OF UNSPECIFIED SITE OF AORTA: ICD-10-CM

## 2019-02-28 DIAGNOSIS — D03.9 MELANOMA IN SITU, UNSPECIFIED: Chronic | ICD-10-CM

## 2019-02-28 PROCEDURE — A9585: CPT

## 2019-02-28 PROCEDURE — C8902: CPT

## 2019-02-28 PROCEDURE — 74185 MRA ABD W OR W/O CNTRST: CPT | Mod: 26

## 2019-03-04 ENCOUNTER — TRANSCRIPTION ENCOUNTER (OUTPATIENT)
Age: 69
End: 2019-03-04

## 2019-03-20 ENCOUNTER — APPOINTMENT (OUTPATIENT)
Dept: INTERNAL MEDICINE | Facility: CLINIC | Age: 69
End: 2019-03-20
Payer: MEDICARE

## 2019-03-20 ENCOUNTER — APPOINTMENT (OUTPATIENT)
Dept: CARDIOLOGY | Facility: CLINIC | Age: 69
End: 2019-03-20
Payer: MEDICARE

## 2019-03-20 VITALS
BODY MASS INDEX: 27.99 KG/M2 | HEIGHT: 69 IN | WEIGHT: 189 LBS | SYSTOLIC BLOOD PRESSURE: 110 MMHG | DIASTOLIC BLOOD PRESSURE: 80 MMHG | TEMPERATURE: 97.9 F

## 2019-03-20 VITALS
DIASTOLIC BLOOD PRESSURE: 81 MMHG | WEIGHT: 186 LBS | HEART RATE: 62 BPM | BODY MASS INDEX: 26.63 KG/M2 | HEIGHT: 70 IN | SYSTOLIC BLOOD PRESSURE: 133 MMHG | OXYGEN SATURATION: 98 %

## 2019-03-20 PROCEDURE — 36415 COLL VENOUS BLD VENIPUNCTURE: CPT

## 2019-03-20 PROCEDURE — 99214 OFFICE O/P EST MOD 30 MIN: CPT

## 2019-03-20 PROCEDURE — 93000 ELECTROCARDIOGRAM COMPLETE: CPT

## 2019-03-20 PROCEDURE — 99203 OFFICE O/P NEW LOW 30 MIN: CPT | Mod: 25

## 2019-03-22 ENCOUNTER — NON-APPOINTMENT (OUTPATIENT)
Age: 69
End: 2019-03-22

## 2019-03-22 LAB
ALBUMIN SERPL ELPH-MCNC: 4.3 G/DL
ALP BLD-CCNC: 99 U/L
ALT SERPL-CCNC: 25 U/L
ANION GAP SERPL CALC-SCNC: 16 MMOL/L
AST SERPL-CCNC: 30 U/L
BILIRUB SERPL-MCNC: 0.8 MG/DL
BUN SERPL-MCNC: 28 MG/DL
CALCIUM SERPL-MCNC: 11 MG/DL
CHLORIDE SERPL-SCNC: 100 MMOL/L
CHOLEST SERPL-MCNC: 154 MG/DL
CHOLEST/HDLC SERPL: 5.1 RATIO
CO2 SERPL-SCNC: 26 MMOL/L
CREAT SERPL-MCNC: 1.44 MG/DL
GLUCOSE SERPL-MCNC: 76 MG/DL
HBA1C MFR BLD HPLC: 5.1 %
HDLC SERPL-MCNC: 30 MG/DL
LDLC SERPL CALC-MCNC: 79 MG/DL
POTASSIUM SERPL-SCNC: 4.7 MMOL/L
PROT SERPL-MCNC: 7.3 G/DL
PSA SERPL-MCNC: 3.46 NG/ML
SODIUM SERPL-SCNC: 141 MMOL/L
TRIGL SERPL-MCNC: 225 MG/DL
TSH SERPL-ACNC: 3.11 UIU/ML

## 2019-03-25 LAB
BASOPHILS # BLD AUTO: 0.07 K/UL
BASOPHILS NFR BLD AUTO: 0.9 %
EOSINOPHIL # BLD AUTO: 0.22 K/UL
EOSINOPHIL NFR BLD AUTO: 2.7 %
HCT VFR BLD CALC: 49.9 %
HGB BLD-MCNC: 16.8 G/DL
IMM GRANULOCYTES NFR BLD AUTO: 0.4 %
LYMPHOCYTES # BLD AUTO: 1.89 K/UL
LYMPHOCYTES NFR BLD AUTO: 23 %
MAN DIFF?: NORMAL
MCHC RBC-ENTMCNC: 29.6 PG
MCHC RBC-ENTMCNC: 33.7 GM/DL
MCV RBC AUTO: 88 FL
MONOCYTES # BLD AUTO: 0.75 K/UL
MONOCYTES NFR BLD AUTO: 9.1 %
NEUTROPHILS # BLD AUTO: 5.26 K/UL
NEUTROPHILS NFR BLD AUTO: 63.9 %
PLATELET # BLD AUTO: 228 K/UL
RBC # BLD: 5.67 M/UL
RBC # FLD: 12.8 %
WBC # FLD AUTO: 8.22 K/UL

## 2019-04-10 ENCOUNTER — APPOINTMENT (OUTPATIENT)
Dept: CARDIOTHORACIC SURGERY | Facility: CLINIC | Age: 69
End: 2019-04-10
Payer: MEDICARE

## 2019-04-10 VITALS
WEIGHT: 169 LBS | DIASTOLIC BLOOD PRESSURE: 93 MMHG | HEIGHT: 69 IN | HEART RATE: 61 BPM | RESPIRATION RATE: 16 BRPM | BODY MASS INDEX: 25.03 KG/M2 | OXYGEN SATURATION: 99 % | SYSTOLIC BLOOD PRESSURE: 146 MMHG

## 2019-04-10 PROCEDURE — 99213 OFFICE O/P EST LOW 20 MIN: CPT

## 2019-04-10 NOTE — HISTORY OF PRESENT ILLNESS
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 Jorge Pierce @ [facility]), presents for a follow up visit. Family history of aortic dissection (cousin). \par \par MRA chest, abdomen and pelvis 2/28/19 revealed: Stable mild aortic dilatation status post ascending thoracic aortic repair.\par Thoracic aortic measurements are as follows:\par Sinuses of Valsalva: 4 cm, unchanged.\par Sinotubular junction 2.4 cm, unchanged.\par Mid descending aorta 2.7 cm, unchanged\par Aortic arch distal to the repair up to 4.7 cm, unchanged\par Proximal descending thoracic aorta 3.6 cm and mid descending aorta 3.3 \par cm,, unchanged.\par At the diaphragmatic hiatus, 2.8 cm, unchanged.\par \par Patient is doing well and denies recent hospitalization, ER visits, or surgeries. He  denies fever, chills, fatigue, headache, blurred vision, dizziness, syncope, chest pain, palpitations, shortness of breath, orthopnea, paroxysmal nocturnal dyspnea, nausea, vomiting, abdominal pain, back pain, BRBPR or swelling to legs.\par

## 2019-04-10 NOTE — END OF VISIT
[FreeTextEntry3] : Written by Jaycee Gavin NP, acting as a scribe for Dr. Daniel Castellon.\par “The documentation recorded by the scribe accurately reflects the service I personally performed and the decisions made by me.” Signature, Daniel Castellon MD\par

## 2019-04-10 NOTE — DATA REVIEWED
[FreeTextEntry1] : CTA of the Chest, Abdomen and Pelvis 6/22/2017:\par -Aortic root: 3.7 cm. \par -Aortic Arch: 4.7 cm\par -Descending aorta: 3.6 cm, unchanged\par -Several small cystic lesion in the pancreas, largest up to 1.3 cm, unchanged\par \par MRA of the Chest, Abdomen and Pelvis 3/19/2018:\par -Aortic sinuses of Valsalva: 4 cm\par -Mid ascending aorta: 2.7 cm\par -Aortic arch: 4.8 cm\par -Mid descending thoracic aorta: 3.3 cm\par -Aorta at diaphragm: 2.9 cm\par \par MRA 2/28/19\par FINDINGS:\par \par CHEST: \par VESSELS: Status post ascending thoracic aortic repair.\par \par Thoracic aortic measurements are as follows:\par Sinuses of Valsalva: 4 cm, unchanged.\par Sinotubular junction 2.4 cm, unchanged.\par Mid descending aorta 2.7 cm, unchanged\par Aortic arch distal to the repair up to 4.7 cm, unchanged\par Proximal descending thoracic aorta 3.6 cm and mid descending aorta 3.3 \par cm,, unchanged.\par At the diaphragmatic hiatus, 2.8 cm, unchanged.\par \par PLEURA: No pleural effusion.\par HEART: Heart size is normal. No pericardial effusion.\par MEDIASTINUM AND SHARRI: No lymphadenopathy.\par CHEST WALL AND LOWER NECK: Within normal limits.\par \par VISUALIZED UPPER ABDOMEN: Multiple small pancreatic cysts measuring up to \par 1 cm in the head and small left renal cysts are again seen. No main \par pancreatic duct dilatation. Stable subcentimeter right hepatic cyst. \par Stable nodular right adrenal\par \par IMPRESSION: \par Stable mild aortic dilatation status post ascending thoracic aortic \par repair.\par \par

## 2019-04-10 NOTE — PROCEDURE
[FreeTextEntry1] : Dr. Castellon discussed activity restrictions with the patient, and would advise exercise at a moderate amount with no heavy lifting over one third of body weight, and avoiding heart rates that exceed 140 beats per minute. In addition, every patient should abstain from tobacco abuse and to avoid all illicit drug use, especially stimulants such as cocaine or methamphetamine. Dr. Castellon also counseled regarding maintaining a healthy heart diet, and losing any excessive weight as this also put undue stress on both the aorta and entire cardiovascular system. First degree family members should be screened for bicuspid valve disease, and ascending aortic aneurysms. \par \par

## 2019-04-10 NOTE — ASSESSMENT
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 oJrge Pierce @ [facility]), presents for a follow up visit. Family history of aortic dissection (cousin). \par \par MRA chest, abdomen and pelvis 2/28/19 revealed: Stable mild aortic dilatation status post ascending thoracic aortic repair.\par Thoracic aortic measurements are as follows:\par Sinuses of Valsalva: 4 cm, unchanged.\par Sinotubular junction 2.4 cm, unchanged.\par Mid descending aorta 2.7 cm, unchanged\par Aortic arch distal to the repair up to 4.7 cm, unchanged\par Proximal descending thoracic aorta 3.6 cm and mid descending aorta 3.3 \par cm,, unchanged.\par At the diaphragmatic hiatus, 2.8 cm, unchanged.\par \par I have reviewed the patient's medical records, diagnostic images during the time of this office consultation and have made the following recommendation. The surgical repair is intact and stable.\par Plan\par \par 1. Follow up in [facility] 2 years with a CTA chest, abdomen and pelvis. \par 2. Continue medication regimen.\par 3. Follow up with cardiologist and PCP.\par 4. Blood pressure management. \par

## 2019-04-10 NOTE — CONSULT LETTER
[Dear  ___] : Dear  [unfilled], [FreeTextEntry2] : Brett Gayle MD\par [facility]\par [phone] [place] [address]\par [address]	\[place] [address] [FreeTextEntry1] : I had the pleasure of seeing your patient, MARIALUISA MONTGOMERY, in my office today. \par \par We take a multidisciplinary team approach to patient care and consider you, the referring physician, an extension of our team. We will maintain an open line of communication with you throughout your patient's treatment course.  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 Jorge Pierce @ [facility]). The patient presents to the office today for a routine follow up visit with repeat diagnostic imaging. I have enclosed a copy for your records.\par \par The surgical repair is intact and stable. Therefore, I have recommended that the patient will follow up in the [facility] in 2 years with a CTA chest, abdomen and pelvis to monitor his surgical repair. My office will assist the patient with his upcoming appointment and I will update you on his  progress at that time.\par \par I have discussed with the patient that we will continue to monitor his aortic pathology closely at the [facility] for the [facility], that encompasses the entire health care system and is one of the largest in the nation at this point.\par \par I appreciate the opportunity to care for your patient at the [facility] for [facility] based at [facility]. If there are any questions or concerns, please call me directly at [phone]. \par \par Please see my note below. \par \par \par Sincerely, \par \par \par \par \par \par \par Daniel Castellon M.D.\par Professor of Cardiovascular and Thoracic Surgery\par Minimally Invasive Valve Surgeon\par Director of Aortic Surgery, [facility]\par Cell: [phone]\par Email: [email or web address] \par \par [facility]:\par [address], [address] Floor, [place] [address]\par Office: [phone]\par Fax: [phone]\par \par [facility]:\par Department of Cardiovascular and Thoracic Surgery\par [address] [place] [address], [address]\par Office: [phone]\par Fax: [phone]\par \par Practice Manager: Ms. Cherelle Jim\par Email: [email or web address]\par Phone: [phone]\par

## 2019-04-10 NOTE — PHYSICAL EXAM
[PERRL With Normal Accommodation] : pupils were equal in size, round, and reactive to light [Extraocular Movements] : extraocular movements were intact [Sclera] : the sclera and conjunctiva were normal [Neck Appearance] : the appearance of the neck was normal [Neck Cervical Mass (___cm)] : no neck mass was observed [Jugular Venous Distention Increased] : there was no jugular-venous distention [Thyroid Nodule] : there were no palpable thyroid nodules [Thyroid Diffuse Enlargement] : the thyroid was not enlarged [Auscultation Breath Sounds / Voice Sounds] : lungs were clear to auscultation bilaterally [Heart Rate And Rhythm] : heart rate was normal and rhythm regular [Heart Sounds Gallop] : no gallops [Heart Sounds] : normal S1 and S2 [Murmurs] : no murmurs [Chest Visual Inspection Thoracic Asymmetry] : no chest asymmetry [Examination Of The Chest] : the chest was normal in appearance [Heart Sounds Pericardial Friction Rub] : no pericardial rub [Diminished Respiratory Excursion] : normal chest expansion [2+] : left 2+ [No Abnormalities] : the abdominal aorta was not enlarged and no bruit was heard [Bowel Sounds] : normal bowel sounds [Abdomen Soft] : soft [Abdomen Tenderness] : non-tender [Abdomen Mass (___ Cm)] : no abdominal mass palpated [Cervical Lymph Nodes Enlarged Posterior Bilaterally] : posterior cervical [Cervical Lymph Nodes Enlarged Anterior Bilaterally] : anterior cervical [Abnormal Walk] : normal gait [No Spinal Tenderness] : no spinal tenderness [No CVA Tenderness] : no ~M costovertebral angle tenderness [Motor Tone] : muscle strength and tone were normal [Nail Clubbing] : no clubbing  or cyanosis of the fingernails [Musculoskeletal - Swelling] : no joint swelling seen [Skin Color & Pigmentation] : normal skin color and pigmentation [Skin Turgor] : normal skin turgor [] : no rash [Deep Tendon Reflexes (DTR)] : deep tendon reflexes were 2+ and symmetric [No Focal Deficits] : no focal deficits [Sensation] : the sensory exam was normal to light touch and pinprick [Oriented To Time, Place, And Person] : oriented to person, place, and time [Impaired Insight] : insight and judgment were intact [Affect] : the affect was normal [FreeTextEntry1] : deferred

## 2019-05-08 ENCOUNTER — RX RENEWAL (OUTPATIENT)
Age: 69
End: 2019-05-08

## 2019-06-10 ENCOUNTER — APPOINTMENT (OUTPATIENT)
Dept: INTERNAL MEDICINE | Facility: CLINIC | Age: 69
End: 2019-06-10
Payer: MEDICARE

## 2019-06-10 VITALS
WEIGHT: 183 LBS | HEIGHT: 69 IN | BODY MASS INDEX: 27.11 KG/M2 | SYSTOLIC BLOOD PRESSURE: 130 MMHG | DIASTOLIC BLOOD PRESSURE: 80 MMHG

## 2019-06-10 DIAGNOSIS — E83.52 HYPERCALCEMIA: ICD-10-CM

## 2019-06-10 LAB
BASOPHILS # BLD AUTO: 0.06 K/UL
BASOPHILS NFR BLD AUTO: 0.8 %
EOSINOPHIL # BLD AUTO: 0.16 K/UL
EOSINOPHIL NFR BLD AUTO: 2 %
HCT VFR BLD CALC: 49 %
HGB BLD-MCNC: 16.1 G/DL
IMM GRANULOCYTES NFR BLD AUTO: 0.3 %
LYMPHOCYTES # BLD AUTO: 1.73 K/UL
LYMPHOCYTES NFR BLD AUTO: 21.8 %
MAN DIFF?: NORMAL
MCHC RBC-ENTMCNC: 29.3 PG
MCHC RBC-ENTMCNC: 32.9 GM/DL
MCV RBC AUTO: 89.1 FL
MONOCYTES # BLD AUTO: 0.8 K/UL
MONOCYTES NFR BLD AUTO: 10.1 %
NEUTROPHILS # BLD AUTO: 5.17 K/UL
NEUTROPHILS NFR BLD AUTO: 65 %
PLATELET # BLD AUTO: 180 K/UL
RBC # BLD: 5.5 M/UL
RBC # FLD: 13.3 %
WBC # FLD AUTO: 7.94 K/UL

## 2019-06-10 PROCEDURE — 90715 TDAP VACCINE 7 YRS/> IM: CPT | Mod: GY

## 2019-06-10 PROCEDURE — 99213 OFFICE O/P EST LOW 20 MIN: CPT | Mod: 25

## 2019-06-10 PROCEDURE — 90471 IMMUNIZATION ADMIN: CPT | Mod: GY

## 2019-06-10 RX ORDER — AZITHROMYCIN 250 MG/1
250 TABLET, FILM COATED ORAL
Qty: 1 | Refills: 0 | Status: DISCONTINUED | COMMUNITY
Start: 2019-03-20 | End: 2019-06-10

## 2019-06-11 ENCOUNTER — RESULT REVIEW (OUTPATIENT)
Age: 69
End: 2019-06-11

## 2019-06-11 LAB
CALCIUM SERPL-MCNC: 10 MG/DL
PARATHYROID HORMONE INTACT: 75 PG/ML

## 2019-06-14 ENCOUNTER — TRANSCRIPTION ENCOUNTER (OUTPATIENT)
Age: 69
End: 2019-06-14

## 2019-06-17 ENCOUNTER — RX RENEWAL (OUTPATIENT)
Age: 69
End: 2019-06-17

## 2019-06-17 LAB
ALBUMIN SERPL ELPH-MCNC: 4.3 G/DL
ALP BLD-CCNC: 92 U/L
ALT SERPL-CCNC: 20 U/L
ANION GAP SERPL CALC-SCNC: 18 MMOL/L
AST SERPL-CCNC: 19 U/L
BILIRUB SERPL-MCNC: 0.8 MG/DL
BUN SERPL-MCNC: 36 MG/DL
CALCIUM SERPL-MCNC: 10 MG/DL
CHLORIDE SERPL-SCNC: 106 MMOL/L
CO2 SERPL-SCNC: 21 MMOL/L
CREAT SERPL-MCNC: 1.35 MG/DL
GLUCOSE SERPL-MCNC: 91 MG/DL
POTASSIUM SERPL-SCNC: 4.8 MMOL/L
PROT SERPL-MCNC: 6.4 G/DL
SODIUM SERPL-SCNC: 145 MMOL/L

## 2019-07-25 ENCOUNTER — APPOINTMENT (OUTPATIENT)
Dept: NEUROLOGY | Facility: CLINIC | Age: 69
End: 2019-07-25
Payer: MEDICARE

## 2019-07-25 VITALS
HEIGHT: 69 IN | DIASTOLIC BLOOD PRESSURE: 92 MMHG | WEIGHT: 177 LBS | SYSTOLIC BLOOD PRESSURE: 159 MMHG | HEART RATE: 55 BPM | BODY MASS INDEX: 26.22 KG/M2

## 2019-07-25 PROCEDURE — 99214 OFFICE O/P EST MOD 30 MIN: CPT

## 2019-07-25 NOTE — DISCUSSION/SUMMARY
[FreeTextEntry1] : 68 M who is here for followup. His gait difficulty is a combination of myopia on his right eye so he has 2 d vision, hip and knee pain, neuropathy and weakness at times of dorsiflexion. He will continue physical therapy and he will f/u with his orthopedic physician. He will f/u with me prn.\par \par Patient was advised to continue to monitor for neurologic symptoms and to notify my office or go to the nearest emergency room if there are any changes. Neurologic issues were discussed with the patient. All questions were answered to complete satisfaction. Education was provided to the patient during this encounter.\par Side effects of the above medications were discussed in detail including but not limited to applicable black box warning and teratogenicity as appropriate. \par Patient was advised to bring previous records to my office. \par \par

## 2019-07-25 NOTE — PHYSICAL EXAM
[General Appearance - Alert] : alert [General Appearance - Well Developed] : well developed [Oriented To Time, Place, And Person] : oriented to person, place, and time [Person] : oriented to person [Place] : oriented to place [Time] : oriented to time [Short Term Intact] : short term memory intact [Span Intact] : the attention span was normal [Naming Objects] : no difficulty naming common objects [Fluency] : fluency intact [Current Events] : adequate knowledge of current events [Cranial Nerves Optic (II)] : visual acuity intact bilaterally,  visual fields full to confrontation, pupils equal round and reactive to light [Cranial Nerves Oculomotor (III)] : extraocular motion intact [Cranial Nerves Trigeminal (V)] : facial sensation intact symmetrically [Cranial Nerves Facial (VII)] : face symmetrical [Cranial Nerves Vestibulocochlear (VIII)] : hearing was intact bilaterally [Cranial Nerves Glossopharyngeal (IX)] : tongue and palate midline [Cranial Nerves Accessory (XI - Cranial And Spinal)] : head turning and shoulder shrug symmetric [Cranial Nerves Hypoglossal (XII)] : there was no tongue deviation with protrusion [Motor Tone] : muscle tone was normal in all four extremities [Motor Strength] : muscle strength was normal in all four extremities [Coordination - Dysmetria Impaired Finger-to-Nose Bilateral] : not present [2+] : Patella left 2+ [FreeTextEntry8] : difficult to toe and heel walk.  [Extraocular Movements] : extraocular movements were intact [Hearing Threshold Finger Rub Not [place]] : hearing was normal [Full Pulse] : the pedal pulses are present [FreeTextEntry1] : see above [] : no rash

## 2019-07-25 NOTE — HISTORY OF PRESENT ILLNESS
[FreeTextEntry1] : 68 M who presents for initial consultation of frequent falls. He sustained a central retinal vein occlusion in 2015. since then, his right eye has been myopic. He will have trouble with curbs. He sees the curb, but somehow is not able to  the height of the curb. He does not bump into things or have any symptoms suggesting visual field deficit or neglect. The visual symptoms started after the central vein occlusion. \par \par He has a history of aortic dissection s/p repair by Dr. Pierce. (12/2013). patient also had central retinal vein occlusion: (2015)  \par \par Interval history: no further visual disturbances since last visit. He has seen Dr. Fletcher (oncologist) who will repeat imaging in 6 months. \par \par interval history: no further visual disturbances or falls. REpeat imaging by Dr. Fletcher shows no strokes. \par \par interval history: Patient is here for urgent visit because on March 14 patient was applying eye drops and tilting his head backwards when he fell. His wife heard a noise and ran upstairs immediately. Patient had no loss of consciousness, post ictal confusion, urinary or fecal incontinence. The patient had no shaking movements. Patient had no changes in his medications. He has no symptoms of orthostatic hypotension. He denies any visual complaints. \par \par interval history: pt who in april who presented with right leg weakness that lasted a few hours. His MRI of brain, mri t and l spine shows degenerative disease. ck 45 at time of admisison. he was noted to have low potassium levels. He felt right leg spasms throughout the night. 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 \par \par interval history: he recently fell by tripping on his left leg. He states there's right eye myopia and left hip and knee pain.

## 2019-08-17 ENCOUNTER — RX RENEWAL (OUTPATIENT)
Age: 69
End: 2019-08-17

## 2019-08-19 ENCOUNTER — APPOINTMENT (OUTPATIENT)
Dept: INTERNAL MEDICINE | Facility: CLINIC | Age: 69
End: 2019-08-19
Payer: MEDICARE

## 2019-08-19 VITALS
DIASTOLIC BLOOD PRESSURE: 80 MMHG | SYSTOLIC BLOOD PRESSURE: 120 MMHG | BODY MASS INDEX: 27.55 KG/M2 | WEIGHT: 186 LBS | HEIGHT: 69 IN

## 2019-08-19 DIAGNOSIS — Z87.09 PERSONAL HISTORY OF OTHER DISEASES OF THE RESPIRATORY SYSTEM: ICD-10-CM
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
## 2019-08-19 PROCEDURE — 99213 OFFICE O/P EST LOW 20 MIN: CPT

## 2019-08-28 ENCOUNTER — APPOINTMENT (OUTPATIENT)
Dept: ORTHOPEDIC SURGERY | Facility: CLINIC | Age: 69
End: 2019-08-28
Payer: MEDICARE

## 2019-08-28 ENCOUNTER — TRANSCRIPTION ENCOUNTER (OUTPATIENT)
Age: 69
End: 2019-08-28

## 2019-08-28 DIAGNOSIS — M70.62 TROCHANTERIC BURSITIS, LEFT HIP: ICD-10-CM

## 2019-08-28 DIAGNOSIS — M48.061 SPINAL STENOSIS, LUMBAR REGION WITHOUT NEUROGENIC CLAUDICATION: ICD-10-CM

## 2019-08-28 PROCEDURE — 72100 X-RAY EXAM L-S SPINE 2/3 VWS: CPT

## 2019-08-28 PROCEDURE — 99214 OFFICE O/P EST MOD 30 MIN: CPT

## 2019-08-28 PROCEDURE — 72170 X-RAY EXAM OF PELVIS: CPT

## 2019-08-28 NOTE — PHYSICAL EXAM
[de-identified] : General Exam\par \par Well developed, well nourished\par No apparent distress\par Oriented to person, place, and time\par Mood: Normal\par Affect: Normal\par Balance and coordination: Normal\par Gait: Normal\par \par Left hip exam\par \par Skin: Clean/dry and intact\par Inspection: No obvious deformity, no swelling, no ecchymosis.\par Tenderness: no tenderness over greater trochanter/glut medius insertion. No tenderness pubic symphysis, pubic tubercle, hip flexors. No ttp ischial tuberosity or buttock. No ttp over the ASIS/Illiac crest.\par ROM: 0-120°. Internal rotation 30 external rotation 70\par Painful ROM: None\par Additional tests: No pain with circumduction negative impingement test at 90° mildly positive impingement test at 60° negative Asael negative [facility]\par Strength: 5/5 hip flexion/ADD/ABD/Q/H/TA/GS/EHL\par Neuro: Sensation in tact to light touch throughout in dp/sp/tib/carol/saph distributions\par Pulses: 2+ DP/PT pulses\par  [de-identified] : \par The following radiographs were ordered and read by me during this patients visit. I reviewed each radiograph in detail with the patient and discussed the findings as highlighted below. \par \par AP pelvis AP lateral lumbar spine were obtained today. Severe osteoarthritis of the right hip with joint space narrowing sclerosis. Mild arthritis of the left hip. Severe degenerative scoliosis and spinal stenosis\par \par

## 2019-08-28 NOTE — DISCUSSION/SUMMARY
[de-identified] : 68-year-old male left hip trochanteric bursitis. Spinal stenosis and degenerative scoliosis. Recommend spine consultation given frequent falls gait and balance and neuropathy physical therapy stretching exercises in terms of his hip cortisone injection if remains refractory followup as needed all questions answered

## 2019-08-28 NOTE — HISTORY OF PRESENT ILLNESS
[de-identified] : 68-year-old male long-standing complaint of low back pain and neuropathy. Frequent falls and gait and balance. Referred to orthopedics today for evaluation of left lateral hip pain for several weeks. I did do physical therapy. He reports pain in the lateral aspect of his hip worse with lying on his side he denies any numbness or tingling\par \par The patient's past medical history, past surgical history, medications, allergies, and social history were reviewed by me today with the patient and documented accordingly. In addition, the patient's family history, which is noncontributory to this visit, was also reviewed.\par

## 2019-09-05 ENCOUNTER — APPOINTMENT (OUTPATIENT)
Dept: ORTHOPEDIC SURGERY | Facility: CLINIC | Age: 69
End: 2019-09-05
Payer: MEDICARE

## 2019-09-05 VITALS
SYSTOLIC BLOOD PRESSURE: 123 MMHG | DIASTOLIC BLOOD PRESSURE: 79 MMHG | HEIGHT: 69 IN | HEART RATE: 61 BPM | BODY MASS INDEX: 26.66 KG/M2 | WEIGHT: 180 LBS

## 2019-09-05 DIAGNOSIS — M51.36 OTHER INTERVERTEBRAL DISC DEGENERATION, LUMBAR REGION: ICD-10-CM

## 2019-09-05 DIAGNOSIS — M41.9 SCOLIOSIS, UNSPECIFIED: ICD-10-CM

## 2019-09-05 PROCEDURE — 99215 OFFICE O/P EST HI 40 MIN: CPT

## 2019-09-05 PROCEDURE — 72100 X-RAY EXAM L-S SPINE 2/3 VWS: CPT

## 2019-09-06 ENCOUNTER — FORM ENCOUNTER (OUTPATIENT)
Age: 69
End: 2019-09-06

## 2019-09-07 ENCOUNTER — OUTPATIENT (OUTPATIENT)
Dept: OUTPATIENT SERVICES | Facility: HOSPITAL | Age: 69
LOS: 1 days | End: 2019-09-07
Payer: MEDICARE

## 2019-09-07 ENCOUNTER — APPOINTMENT (OUTPATIENT)
Dept: MRI IMAGING | Facility: CLINIC | Age: 69
End: 2019-09-07
Payer: MEDICARE

## 2019-09-07 DIAGNOSIS — D03.9 MELANOMA IN SITU, UNSPECIFIED: Chronic | ICD-10-CM

## 2019-09-07 DIAGNOSIS — Z98.890 OTHER SPECIFIED POSTPROCEDURAL STATES: Chronic | ICD-10-CM

## 2019-09-07 DIAGNOSIS — Z00.8 ENCOUNTER FOR OTHER GENERAL EXAMINATION: ICD-10-CM

## 2019-09-07 PROCEDURE — 72148 MRI LUMBAR SPINE W/O DYE: CPT | Mod: 26

## 2019-09-07 PROCEDURE — 72148 MRI LUMBAR SPINE W/O DYE: CPT

## 2019-09-24 ENCOUNTER — MEDICATION RENEWAL (OUTPATIENT)
Age: 69
End: 2019-09-24

## 2019-09-25 ENCOUNTER — NON-APPOINTMENT (OUTPATIENT)
Age: 69
End: 2019-09-25

## 2019-09-25 ENCOUNTER — APPOINTMENT (OUTPATIENT)
Dept: CARDIOLOGY | Facility: CLINIC | Age: 69
End: 2019-09-25
Payer: MEDICARE

## 2019-09-25 VITALS
HEART RATE: 55 BPM | WEIGHT: 183 LBS | OXYGEN SATURATION: 9 % | BODY MASS INDEX: 27.11 KG/M2 | DIASTOLIC BLOOD PRESSURE: 86 MMHG | SYSTOLIC BLOOD PRESSURE: 153 MMHG | HEIGHT: 69 IN

## 2019-09-25 VITALS — DIASTOLIC BLOOD PRESSURE: 65 MMHG | SYSTOLIC BLOOD PRESSURE: 125 MMHG

## 2019-09-25 DIAGNOSIS — R29.898 OTHER SYMPTOMS AND SIGNS INVOLVING THE MUSCULOSKELETAL SYSTEM: ICD-10-CM

## 2019-09-25 DIAGNOSIS — R93.89 ABNORMAL FINDINGS ON DIAGNOSTIC IMAGING OF OTHER SPECIFIED BODY STRUCTURES: ICD-10-CM

## 2019-09-25 PROCEDURE — 36415 COLL VENOUS BLD VENIPUNCTURE: CPT

## 2019-09-25 PROCEDURE — 99214 OFFICE O/P EST MOD 30 MIN: CPT

## 2019-09-25 PROCEDURE — 93000 ELECTROCARDIOGRAM COMPLETE: CPT

## 2019-10-01 LAB
ALBUMIN SERPL ELPH-MCNC: 4.6 G/DL
ALP BLD-CCNC: 89 U/L
ALT SERPL-CCNC: 28 U/L
ANION GAP SERPL CALC-SCNC: 15 MMOL/L
AST SERPL-CCNC: 25 U/L
BASOPHILS # BLD AUTO: 0.08 K/UL
BASOPHILS NFR BLD AUTO: 1.1 %
BILIRUB SERPL-MCNC: 0.9 MG/DL
BUN SERPL-MCNC: 25 MG/DL
CALCIUM SERPL-MCNC: 10.5 MG/DL
CHLORIDE SERPL-SCNC: 104 MMOL/L
CHOLEST SERPL-MCNC: 147 MG/DL
CHOLEST/HDLC SERPL: 4.3 RATIO
CO2 SERPL-SCNC: 25 MMOL/L
CREAT SERPL-MCNC: 1.27 MG/DL
EOSINOPHIL # BLD AUTO: 0.17 K/UL
EOSINOPHIL NFR BLD AUTO: 2.3 %
ESTIMATED AVERAGE GLUCOSE: 100 MG/DL
GLUCOSE SERPL-MCNC: 74 MG/DL
HBA1C MFR BLD HPLC: 5.1 %
HCT VFR BLD CALC: 53.3 %
HDLC SERPL-MCNC: 34 MG/DL
HGB BLD-MCNC: 18 G/DL
IMM GRANULOCYTES NFR BLD AUTO: 0.4 %
LDLC SERPL CALC-MCNC: 73 MG/DL
LYMPHOCYTES # BLD AUTO: 2.08 K/UL
LYMPHOCYTES NFR BLD AUTO: 27.9 %
MAN DIFF?: NORMAL
MCHC RBC-ENTMCNC: 29.8 PG
MCHC RBC-ENTMCNC: 33.8 GM/DL
MCV RBC AUTO: 88.1 FL
MONOCYTES # BLD AUTO: 0.75 K/UL
MONOCYTES NFR BLD AUTO: 10.1 %
NEUTROPHILS # BLD AUTO: 4.35 K/UL
NEUTROPHILS NFR BLD AUTO: 58.2 %
PLATELET # BLD AUTO: 201 K/UL
POTASSIUM SERPL-SCNC: 4.4 MMOL/L
PROT SERPL-MCNC: 6.9 G/DL
PSA SERPL-MCNC: 3.15 NG/ML
RBC # BLD: 6.05 M/UL
RBC # FLD: 14.4 %
SODIUM SERPL-SCNC: 144 MMOL/L
TRIGL SERPL-MCNC: 202 MG/DL
TSH SERPL-ACNC: 3.7 UIU/ML
WBC # FLD AUTO: 7.46 K/UL

## 2019-10-15 ENCOUNTER — OUTPATIENT (OUTPATIENT)
Dept: OUTPATIENT SERVICES | Facility: HOSPITAL | Age: 69
LOS: 1 days | Discharge: HOME | End: 2019-10-15

## 2019-10-15 DIAGNOSIS — D03.9 MELANOMA IN SITU, UNSPECIFIED: Chronic | ICD-10-CM

## 2019-10-15 DIAGNOSIS — Z98.890 OTHER SPECIFIED POSTPROCEDURAL STATES: Chronic | ICD-10-CM

## 2019-10-15 DIAGNOSIS — D75.1 SECONDARY POLYCYTHEMIA: ICD-10-CM

## 2019-10-25 ENCOUNTER — OUTPATIENT (OUTPATIENT)
Dept: OUTPATIENT SERVICES | Facility: HOSPITAL | Age: 69
LOS: 1 days | End: 2019-10-25
Payer: MEDICARE

## 2019-10-25 ENCOUNTER — RESULT REVIEW (OUTPATIENT)
Age: 69
End: 2019-10-25

## 2019-10-25 ENCOUNTER — APPOINTMENT (OUTPATIENT)
Dept: HEMATOLOGY ONCOLOGY | Facility: CLINIC | Age: 69
End: 2019-10-25
Payer: MEDICARE

## 2019-10-25 VITALS
HEIGHT: 67.87 IN | OXYGEN SATURATION: 99 % | WEIGHT: 186.07 LBS | BODY MASS INDEX: 28.53 KG/M2 | TEMPERATURE: 97.6 F | DIASTOLIC BLOOD PRESSURE: 89 MMHG | HEART RATE: 60 BPM | SYSTOLIC BLOOD PRESSURE: 168 MMHG | RESPIRATION RATE: 16 BRPM

## 2019-10-25 DIAGNOSIS — D75.1 SECONDARY POLYCYTHEMIA: ICD-10-CM

## 2019-10-25 DIAGNOSIS — Z98.890 OTHER SPECIFIED POSTPROCEDURAL STATES: Chronic | ICD-10-CM

## 2019-10-25 DIAGNOSIS — D03.9 MELANOMA IN SITU, UNSPECIFIED: Chronic | ICD-10-CM

## 2019-10-25 LAB
BASOPHILS # BLD AUTO: 0.1 K/UL — SIGNIFICANT CHANGE UP (ref 0–0.2)
BASOPHILS NFR BLD AUTO: 0.8 % — SIGNIFICANT CHANGE UP (ref 0–2)
COHGB MFR BLDV: <1 % — SIGNIFICANT CHANGE UP (ref 0–1.5)
EOSINOPHIL # BLD AUTO: 0.2 K/UL — SIGNIFICANT CHANGE UP (ref 0–0.5)
EOSINOPHIL NFR BLD AUTO: 2.3 % — SIGNIFICANT CHANGE UP (ref 0–6)
HCT VFR BLD CALC: 50 % — SIGNIFICANT CHANGE UP (ref 39–50)
HGB BLD CALC-MCNC: 17.2 G/DL — HIGH (ref 13–17)
HGB BLD-MCNC: 17.3 G/DL — HIGH (ref 13–17)
LYMPHOCYTES # BLD AUTO: 2 K/UL — SIGNIFICANT CHANGE UP (ref 1–3.3)
LYMPHOCYTES # BLD AUTO: 27 % — SIGNIFICANT CHANGE UP (ref 13–44)
MCHC RBC-ENTMCNC: 31.1 PG — SIGNIFICANT CHANGE UP (ref 27–34)
MCHC RBC-ENTMCNC: 34.6 G/DL — SIGNIFICANT CHANGE UP (ref 32–36)
MCV RBC AUTO: 89.9 FL — SIGNIFICANT CHANGE UP (ref 80–100)
MONOCYTES # BLD AUTO: 0.7 K/UL — SIGNIFICANT CHANGE UP (ref 0–0.9)
MONOCYTES NFR BLD AUTO: 9 % — SIGNIFICANT CHANGE UP (ref 2–14)
NEUTROPHILS # BLD AUTO: 4.5 K/UL — SIGNIFICANT CHANGE UP (ref 1.8–7.4)
NEUTROPHILS NFR BLD AUTO: 61 % — SIGNIFICANT CHANGE UP (ref 43–77)
PLATELET # BLD AUTO: 180 K/UL — SIGNIFICANT CHANGE UP (ref 150–400)
RBC # BLD: 5.56 M/UL — SIGNIFICANT CHANGE UP (ref 4.2–5.8)
RBC # FLD: 12.4 % — SIGNIFICANT CHANGE UP (ref 10.3–14.5)
WBC # BLD: 7.4 K/UL — SIGNIFICANT CHANGE UP (ref 3.8–10.5)
WBC # FLD AUTO: 7.4 K/UL — SIGNIFICANT CHANGE UP (ref 3.8–10.5)

## 2019-10-25 PROCEDURE — 82375 ASSAY CARBOXYHB QUANT: CPT

## 2019-10-25 PROCEDURE — 99205 OFFICE O/P NEW HI 60 MIN: CPT

## 2019-10-28 LAB
EPO SERPL-MCNC: 13.1 MIU/ML
Lab: 29 MM HG — SIGNIFICANT CHANGE UP (ref 24–30)
Lab: SIGNIFICANT CHANGE UP
Lab: SIGNIFICANT CHANGE UP

## 2019-10-31 NOTE — PHYSICAL EXAM
[Restricted in physically strenuous activity but ambulatory and able to carry out work of a light or sedentary nature] : Status 1- Restricted in physically strenuous activity but ambulatory and able to carry out work of a light or sedentary nature, e.g., light house work, office work [Normal] : affect appropriate [Ulcers] : no ulcers [Mucositis] : no mucositis [Thrush] : no thrush [de-identified] : midline chest scar

## 2019-10-31 NOTE — ASSESSMENT
[FreeTextEntry1] : 70 y/o M with history of aortic dissection in 2016, longstanding history of HTN here for evaluation of polycythemia. \par \par -Repeat H/H today in office lower.\par -No symptoms of primary MPN. \par -Will check erythropoietin level and carboxyhemoglobin / p50 (although non-smoker, so unlikely to be revealing). \par -No requirement for phlebotomy at the present time, and he is a high risk for phlebotomy given low BP in surgical settings in past. \par -Possibly a component of intermittent hypoxia due to diastolic dysfunction. \par -If erythropoietin elevated, will consider repeat CT abdomen/pelvis to evaluate for ectopic production. \par -Also may consider sleep study, although not exhibiting the signs of sleep apnea. \par -Follow up in 1 month for repeat CBC.

## 2019-10-31 NOTE — HISTORY OF PRESENT ILLNESS
[de-identified] : 68 y/o M with longstanding history of hypertension, with aortic dissection in 2016 with prolonged hospital course, was very critically ill at the time, now with persistent fatigue and exercise intolerance referred to me for polycythemia on lab work. Patient mentions that he was initially under the impression that it was because he was dehydrated, but his doctor became concerned when his electrolytes didn't support that. He reports he remembers being told in the past it was slightly elevated but fluctuated to normal. No flushing or pruritus after warm shower. Denies any chest pains or dyspnea at rest, only has dyspnea on exertion ever since his incident with the aortic dissection. No thrombosis in the past. No family history of hematologic disorders. Patient is a non-smoker, wife reports he does not snore and he has no trouble sleeping, no frequent night-time awakening.

## 2019-10-31 NOTE — CONSULT LETTER
[Dear  ___] : Dear  [unfilled], [Consult Letter:] : I had the pleasure of evaluating your patient, [unfilled]. [( Thank you for referring [unfilled] for consultation for _____ )] : Thank you for referring [unfilled] for consultation for [unfilled] [Please see my note below.] : Please see my note below. [Consult Closing:] : Thank you very much for allowing me to participate in the care of this patient.  If you have any questions, please do not hesitate to contact me. [Sincerely,] : Sincerely, [DrArianna  ___] : Dr. MALONEY [FreeTextEntry3] : Bradley Goldberg M.D. \par Hematology/Oncology\par [facility] [place]\par [phone]\par

## 2019-11-27 ENCOUNTER — OUTPATIENT (OUTPATIENT)
Dept: OUTPATIENT SERVICES | Facility: HOSPITAL | Age: 69
LOS: 1 days | Discharge: HOME | End: 2019-11-27

## 2019-11-27 DIAGNOSIS — D03.9 MELANOMA IN SITU, UNSPECIFIED: Chronic | ICD-10-CM

## 2019-11-27 DIAGNOSIS — D75.1 SECONDARY POLYCYTHEMIA: ICD-10-CM

## 2019-11-27 DIAGNOSIS — Z98.890 OTHER SPECIFIED POSTPROCEDURAL STATES: Chronic | ICD-10-CM

## 2019-12-03 ENCOUNTER — RESULT REVIEW (OUTPATIENT)
Age: 69
End: 2019-12-03

## 2019-12-03 ENCOUNTER — APPOINTMENT (OUTPATIENT)
Dept: HEMATOLOGY ONCOLOGY | Facility: CLINIC | Age: 69
End: 2019-12-03
Payer: MEDICARE

## 2019-12-03 VITALS
RESPIRATION RATE: 16 BRPM | WEIGHT: 192.02 LBS | SYSTOLIC BLOOD PRESSURE: 168 MMHG | TEMPERATURE: 97.7 F | BODY MASS INDEX: 29.31 KG/M2 | DIASTOLIC BLOOD PRESSURE: 96 MMHG | HEART RATE: 59 BPM | OXYGEN SATURATION: 99 %

## 2019-12-03 LAB
BASOPHILS # BLD AUTO: 0 K/UL — SIGNIFICANT CHANGE UP (ref 0–0.2)
BASOPHILS NFR BLD AUTO: 0.5 % — SIGNIFICANT CHANGE UP (ref 0–2)
EOSINOPHIL # BLD AUTO: 0.3 K/UL — SIGNIFICANT CHANGE UP (ref 0–0.5)
EOSINOPHIL NFR BLD AUTO: 3.8 % — SIGNIFICANT CHANGE UP (ref 0–6)
HCT VFR BLD CALC: 49.1 % — SIGNIFICANT CHANGE UP (ref 39–50)
HGB BLD-MCNC: 16.5 G/DL — SIGNIFICANT CHANGE UP (ref 13–17)
LYMPHOCYTES # BLD AUTO: 2.4 K/UL — SIGNIFICANT CHANGE UP (ref 1–3.3)
LYMPHOCYTES # BLD AUTO: 27.3 % — SIGNIFICANT CHANGE UP (ref 13–44)
MCHC RBC-ENTMCNC: 30 PG — SIGNIFICANT CHANGE UP (ref 27–34)
MCHC RBC-ENTMCNC: 33.6 G/DL — SIGNIFICANT CHANGE UP (ref 32–36)
MCV RBC AUTO: 89.1 FL — SIGNIFICANT CHANGE UP (ref 80–100)
MONOCYTES # BLD AUTO: 0.8 K/UL — SIGNIFICANT CHANGE UP (ref 0–0.9)
MONOCYTES NFR BLD AUTO: 8.9 % — SIGNIFICANT CHANGE UP (ref 2–14)
NEUTROPHILS # BLD AUTO: 5.2 K/UL — SIGNIFICANT CHANGE UP (ref 1.8–7.4)
NEUTROPHILS NFR BLD AUTO: 59.6 % — SIGNIFICANT CHANGE UP (ref 43–77)
PLATELET # BLD AUTO: 190 K/UL — SIGNIFICANT CHANGE UP (ref 150–400)
RBC # BLD: 5.51 M/UL — SIGNIFICANT CHANGE UP (ref 4.2–5.8)
RBC # FLD: 12.4 % — SIGNIFICANT CHANGE UP (ref 10.3–14.5)
WBC # BLD: 8.7 K/UL — SIGNIFICANT CHANGE UP (ref 3.8–10.5)
WBC # FLD AUTO: 8.7 K/UL — SIGNIFICANT CHANGE UP (ref 3.8–10.5)

## 2019-12-03 PROCEDURE — 99214 OFFICE O/P EST MOD 30 MIN: CPT

## 2019-12-03 NOTE — HISTORY OF PRESENT ILLNESS
[de-identified] : 70 y/o M with longstanding history of hypertension, with aortic dissection in 2016 with prolonged hospital course, was very critically ill at the time, now with persistent fatigue and exercise intolerance referred to me for polycythemia on lab work. Patient mentions that he was initially under the impression that it was because he was dehydrated, but his doctor became concerned when his electrolytes didn't support that. He reports he remembers being told in the past it was slightly elevated but fluctuated to normal. No flushing or pruritus after warm shower. Denies any chest pains or dyspnea at rest, only has dyspnea on exertion ever since his incident with the aortic dissection. No thrombosis in the past. No family history of hematologic disorders. Patient is a non-smoker, wife reports he does not snore and he has no trouble sleeping, no frequent night-time awakening. \par \par Patient erythropoietin level at 13.1, Oxygen p50 normal. Following up today.

## 2019-12-03 NOTE — PHYSICAL EXAM
[Restricted in physically strenuous activity but ambulatory and able to carry out work of a light or sedentary nature] : Status 1- Restricted in physically strenuous activity but ambulatory and able to carry out work of a light or sedentary nature, e.g., light house work, office work [Normal] : affect appropriate [Ulcers] : no ulcers [Mucositis] : no mucositis [Thrush] : no thrush [de-identified] : midline chest scar

## 2019-12-03 NOTE — ASSESSMENT
[FreeTextEntry1] : 70 y/o M with history of aortic dissection in 2016, longstanding history of HTN here for evaluation of polycythemia. \par \par -Repeat H/H today in office normal.\par -No symptoms of primary MPN. \par -Follow up in 3 months for repeat counts.

## 2019-12-04 ENCOUNTER — APPOINTMENT (OUTPATIENT)
Dept: NEUROLOGY | Facility: CLINIC | Age: 69
End: 2019-12-04
Payer: MEDICARE

## 2019-12-04 VITALS
HEART RATE: 60 BPM | BODY MASS INDEX: 28.41 KG/M2 | DIASTOLIC BLOOD PRESSURE: 75 MMHG | WEIGHT: 181 LBS | HEIGHT: 67 IN | SYSTOLIC BLOOD PRESSURE: 157 MMHG

## 2019-12-04 DIAGNOSIS — G62.9 POLYNEUROPATHY, UNSPECIFIED: ICD-10-CM

## 2019-12-04 PROCEDURE — 95886 MUSC TEST DONE W/N TEST COMP: CPT

## 2019-12-04 PROCEDURE — 99213 OFFICE O/P EST LOW 20 MIN: CPT | Mod: 25

## 2019-12-04 PROCEDURE — 95910 NRV CNDJ TEST 7-8 STUDIES: CPT

## 2019-12-04 NOTE — DISCUSSION/SUMMARY
[FreeTextEntry1] : 69 M who is here for EMG for his known peripheral neuropathy. It's likely multifactorial from his polycythemia, his hx of AAA surgery. Will check for other causes of neuropathy.\par \par More than 50% of time spent on counseling and educate patient on differential, workup, disease course, and treatment/management. Education was provided to the patient during this encounter. All questions and concerns were answered and addressed in detail. The patient verbalized understanding and agreed to plan. Patient was advised to continue to monitor for neurologic symptoms and to notify my office or go to the nearest emergency room if there are any changes. \par Side effects of the above medications were discussed in detail including but not limited to applicable black box warning and teratogenicity as appropriate. \par Patient was advised to bring previous records to my office. \par \par

## 2019-12-04 NOTE — HISTORY OF PRESENT ILLNESS
[FreeTextEntry1] : 68 M who presents for initial consultation of frequent falls. He sustained a central retinal vein occlusion in 2015. since then, his right eye has been myopic. He will have trouble with curbs. He sees the curb, but somehow is not able to  the height of the curb. He does not bump into things or have any symptoms suggesting visual field deficit or neglect. The visual symptoms started after the central vein occlusion. \par \par He has a history of aortic dissection s/p repair by Dr. Pierce. (12/2013). patient also had central retinal vein occlusion: (2015)  \par \par Interval history: no further visual disturbances since last visit. He has seen Dr. Fletcher (oncologist) who will repeat imaging in 6 months. \par \par interval history: no further visual disturbances or falls. REpeat imaging by Dr. Fletcher shows no strokes. \par \par interval history: Patient is here for urgent visit because on March 14 patient was applying eye drops and tilting his head backwards when he fell. His wife heard a noise and ran upstairs immediately. Patient had no loss of consciousness, post ictal confusion, urinary or fecal incontinence. The patient had no shaking movements. Patient had no changes in his medications. He has no symptoms of orthostatic hypotension. He denies any visual complaints. \par \par interval history: pt who in april who presented with right leg weakness that lasted a few hours. His MRI of brain, mri t and l spine shows degenerative disease. ck 45 at time of admisison. he was noted to have low potassium levels. He felt right leg spasms throughout the night. 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 \par \par interval history: he recently fell by tripping on his left leg. He states there's right eye myopia and left hip and knee pain. \par \par interval history: He is here for EMG as requested by Dr. Combs. His MRI l spine was reviewed. He states that the numbness in his feet started after his AAA surgery. No family history of neuropathy or anyone needing a wheelchair at a young age. He is the only person with hammer toes and high arches in his family. His hammer toes first appeared in his 20 and 30's.

## 2019-12-04 NOTE — PROCEDURE
[FreeTextEntry1] : Sensory nerve conduction\par Left sural sensory nerve action potential had normal latency, amplitude and decreased cv. \par Right sural sensory nerve action potential had no response. \par Right superficial peroneal sensory nerve action potential had no response. \par Motor Nerve Conduction Studies\par Right peroneal nerve compound motor action potential had no response when recorded from EDB. When recorded from the TA, there was normal latency, decreased amplitude and normal velocity. The lack of response from the EDB is likely due to atrophy of the EDB muscle.  \par Left peroneal nerve compound motor action potential recorded from the TA had normal latency, decreased amplitude and decreased CV. The lack of response from the EDB is likely due to atrophy of the EDB muscle.  \par Bilateral tibial nerve compound motor action potential had prolonged latency, decreased amplitude on the left and decreased cv.\par Needle EMG was performed using a disposable concentric needle in the listed muscles:\par tibialis anterior had increased amplitude and polyphasia. \par medial gastroc had increased duration and polyphasia. \par long head of biceps femoris had increased amplitude and polyphasia. \par vastus lateralis had increased amplitude and polyphasia. \par gluteus medius muscle had no spontaneous activity and normal motor units. \par Impression: There is electrophysiologic evidence of generalized sensorimotor neuropathy that is worse distally than proximally. She will f/u with me. Clinical and radiologic correlation is advised. \par \par Thank you for the consult,\par Prakash Escoto MD\par Diplomat, American Board of Neurology and Psychiatry\par

## 2019-12-06 ENCOUNTER — RESULT CHARGE (OUTPATIENT)
Age: 69
End: 2019-12-06

## 2019-12-06 ENCOUNTER — APPOINTMENT (OUTPATIENT)
Dept: UROLOGY | Facility: CLINIC | Age: 69
End: 2019-12-06
Payer: MEDICARE

## 2019-12-06 VITALS
DIASTOLIC BLOOD PRESSURE: 93 MMHG | WEIGHT: 181 LBS | HEART RATE: 58 BPM | RESPIRATION RATE: 16 BRPM | HEIGHT: 69.5 IN | SYSTOLIC BLOOD PRESSURE: 151 MMHG | BODY MASS INDEX: 26.21 KG/M2

## 2019-12-06 DIAGNOSIS — I71.2 THORACIC AORTIC ANEURYSM, W/OUT RUPTURE: ICD-10-CM

## 2019-12-06 LAB
BILIRUB UR QL STRIP: NEGATIVE
CLARITY UR: CLEAR
COLLECTION METHOD: NORMAL
GLUCOSE UR-MCNC: NEGATIVE
HCG UR QL: 0.2 EU/DL
HGB UR QL STRIP.AUTO: NEGATIVE
KETONES UR-MCNC: NEGATIVE
LEUKOCYTE ESTERASE UR QL STRIP: NEGATIVE
NITRITE UR QL STRIP: NEGATIVE
PH UR STRIP: 7
PROT UR STRIP-MCNC: 30
SP GR UR STRIP: 1.01

## 2019-12-06 PROCEDURE — 51798 US URINE CAPACITY MEASURE: CPT

## 2019-12-06 PROCEDURE — 99203 OFFICE O/P NEW LOW 30 MIN: CPT | Mod: 25

## 2019-12-06 NOTE — REVIEW OF SYSTEMS
[Dry Eyes] : dryness of the eyes [Eyesight Problems] : eyesight problems [Earache] : earache [Chest Pain] : chest pain [Sore Throat] : sore throat [Poor quality erections] : Poor quality erections [No erections] : no erections [Bladder pressure] : experiences bladder pressure [Told you have blood in urine on a urine test] : told blood was present in a urine test [Seen by urologist before (Name)  ___] : Preciously seen by a urologist: [unfilled] [Leakage of urine with urgency] : leakage of urine with urgency [Strain or push to urinate] : strain or push to urinate [Joint Pain] : joint pain [Limb Swelling] : limb swelling [Dizziness] : dizziness [Difficulty Walking] : difficulty walking [Muscle Weakness] : muscle weakness [Limb Weakness] : limb weakness [Negative] : Heme/Lymph

## 2019-12-06 NOTE — REVIEW OF SYSTEMS
[Earache] : earache [Dry Eyes] : dryness of the eyes [Eyesight Problems] : eyesight problems [Chest Pain] : chest pain [Sore Throat] : sore throat [No erections] : no erections [Poor quality erections] : Poor quality erections [Told you have blood in urine on a urine test] : told blood was present in a urine test [Bladder pressure] : experiences bladder pressure [Seen by urologist before (Name)  ___] : Preciously seen by a urologist: [unfilled] [Leakage of urine with urgency] : leakage of urine with urgency [Strain or push to urinate] : strain or push to urinate [Limb Swelling] : limb swelling [Joint Pain] : joint pain [Dizziness] : dizziness [Difficulty Walking] : difficulty walking [Limb Weakness] : limb weakness [Muscle Weakness] : muscle weakness [Negative] : Heme/Lymph

## 2019-12-09 LAB
25(OH)D3 SERPL-MCNC: 57.3 NG/ML
ALBUMIN MFR SERPL ELPH: 65.9 %
ALBUMIN SERPL-MCNC: 4.2 G/DL
ALBUMIN/GLOB SERPL: 1.9 RATIO
ALBUPE: 58.5 %
ALPHA1 GLOB MFR SERPL ELPH: 4.2 %
ALPHA1 GLOB SERPL ELPH-MCNC: 0.3 G/DL
ALPHA1UPE: 19.7 %
ALPHA2 GLOB MFR SERPL ELPH: 11.7 %
ALPHA2 GLOB SERPL ELPH-MCNC: 0.7 G/DL
ALPHA2UPE: 9.8 %
B-GLOBULIN MFR SERPL ELPH: 9.8 %
B-GLOBULIN SERPL ELPH-MCNC: 0.6 G/DL
BETAUPE: 7.3 %
COPPER SERPL-MCNC: 116 UG/DL
CREAT 24H UR-MCNC: NORMAL G/24 H
CREATININE UR (MAYO): 36 MG/DL
DEPRECATED KAPPA LC FREE/LAMBDA SER: 1.73 RATIO
ESTIMATED AVERAGE GLUCOSE: 100 MG/DL
FOLATE SERPL-MCNC: >20 NG/ML
GAMMA GLOB FLD ELPH-MCNC: 0.5 G/DL
GAMMA GLOB MFR SERPL ELPH: 8.4 %
GAMMAUPE: 4.7 %
HBA1C MFR BLD HPLC: 5.1 %
IGA 24H UR QL IFE: NORMAL
INTERPRETATION SERPL IEP-IMP: NORMAL
KAPPA LC 24H UR QL: NORMAL
KAPPA LC CSF-MCNC: 1.35 MG/DL
KAPPA LC SERPL-MCNC: 2.34 MG/DL
M PROTEIN SPEC IFE-MCNC: NORMAL
MAG AB SER QL: NEGATIVE
PROT PATTERN 24H UR ELPH-IMP: NORMAL
PROT SERPL-MCNC: 6.4 G/DL
PROT SERPL-MCNC: 6.4 G/DL
PROT UR-MCNC: 14 MG/DL
PROT UR-MCNC: 14 MG/DL
SPECIMEN VOL 24H UR: NORMAL ML
T PALLIDUM AB SER QL IA: NEGATIVE
T4 SERPL-MCNC: 5.4 UG/DL
TSH SERPL-ACNC: 4.26 UIU/ML
VIT B12 SERPL-MCNC: 890 PG/ML

## 2019-12-09 NOTE — HISTORY OF PRESENT ILLNESS
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 Nocturia once. FOS ok with no hesitancy, intermittency or straining. No dysuria, hematuria or UTIs. no retention.\par Has ED following his aneurysm surgery - not interested in medications.\par PSA 3.1 \par .  [Dysuria] : no dysuria [Hematuria - Microscopic] : no microscopic hematuria [Hematuria - Gross] : no gross hematuria [Bladder Spasm] : no bladder spasm [Abdominal Pain] : no abdominal pain

## 2019-12-09 NOTE — HISTORY OF PRESENT ILLNESS
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 Nocturia once. FOS ok with no hesitancy, intermittency or straining. No dysuria, hematuria or UTIs. no retention.\par Has ED following his aneurysm surgery - not interested in medications.\par PSA 3.1 \par .  [Hematuria - Microscopic] : no microscopic hematuria [Hematuria - Gross] : no gross hematuria [Abdominal Pain] : no abdominal pain [Bladder Spasm] : no bladder spasm

## 2019-12-09 NOTE — PHYSICAL EXAM
[General Appearance - Well Developed] : well developed [General Appearance - Well Nourished] : well nourished [Edema] : no peripheral edema [Exaggerated Use Of Accessory Muscles For Inspiration] : no accessory muscle use [Abdomen Tenderness] : non-tender [Abdomen Soft] : soft [Abdomen Mass (___ Cm)] : no abdominal mass palpated [Abdomen Hernia] : no hernia was discovered [Prostate Hard Area Or Nodule Bilaterally] : had no palpable nodules [Size ___ (gms)] : size was estimated to be [unfilled] g [Nl Sphincter Tone] : normal sphincter tone [No Lesions] : no lesions [Rectal Exam - Prostate] : was not indurated [Circumcised] : the penis was circumcised [Normal] : normal [Scrotum Hydrocele On The Left] : no hydrocele [Scrotum Hydrocele On The Right] : no hydrocele [Testes] : normal [Epididymis] : was normal [Vas Deferens / Spermatic Cord] : was normal [Normal Station and Gait] : the gait and station were normal for the patient's age [Oriented To Time, Place, And Person] : oriented to person, place, and time [] : no rash [No Focal Deficits] : no focal deficits [Inguinal Lymph Nodes Enlarged Bilaterally] : inguinal

## 2019-12-09 NOTE — ASSESSMENT
[FreeTextEntry1] : not too bothered by LUITs - discussed mediations but happy to leave alone and observe

## 2019-12-09 NOTE — PHYSICAL EXAM
[General Appearance - Well Developed] : well developed [Edema] : no peripheral edema [General Appearance - Well Nourished] : well nourished [Exaggerated Use Of Accessory Muscles For Inspiration] : no accessory muscle use [Abdomen Tenderness] : non-tender [Abdomen Soft] : soft [Abdomen Mass (___ Cm)] : no abdominal mass palpated [Abdomen Hernia] : no hernia was discovered [Size ___ (gms)] : size was estimated to be [unfilled] g [Prostate Hard Area Or Nodule Bilaterally] : had no palpable nodules [Nl Sphincter Tone] : normal sphincter tone [No Lesions] : no lesions [Rectal Exam - Prostate] : was not indurated [Circumcised] : the penis was circumcised [Normal] : normal [Scrotum Hydrocele On The Left] : no hydrocele [Scrotum Hydrocele On The Right] : no hydrocele [Epididymis] : was normal [Testes] : normal [Vas Deferens / Spermatic Cord] : was normal [Normal Station and Gait] : the gait and station were normal for the patient's age [] : no rash [Oriented To Time, Place, And Person] : oriented to person, place, and time [No Focal Deficits] : no focal deficits [Inguinal Lymph Nodes Enlarged Bilaterally] : inguinal

## 2019-12-12 LAB — SULFATIDE AB SER QL: NORMAL

## 2019-12-16 LAB — VIT B6 SERPL-MCNC: 30.7 UG/L

## 2020-01-09 ENCOUNTER — OTHER (OUTPATIENT)
Age: 70
End: 2020-01-09

## 2020-01-22 ENCOUNTER — APPOINTMENT (OUTPATIENT)
Dept: ORTHOPEDIC SURGERY | Facility: CLINIC | Age: 70
End: 2020-01-22
Payer: MEDICARE

## 2020-01-22 VITALS
HEIGHT: 69.5 IN | DIASTOLIC BLOOD PRESSURE: 92 MMHG | BODY MASS INDEX: 26.06 KG/M2 | HEART RATE: 56 BPM | WEIGHT: 180 LBS | SYSTOLIC BLOOD PRESSURE: 156 MMHG

## 2020-01-22 PROCEDURE — 20610 DRAIN/INJ JOINT/BURSA W/O US: CPT | Mod: 50

## 2020-01-22 PROCEDURE — 99215 OFFICE O/P EST HI 40 MIN: CPT | Mod: 25

## 2020-01-22 PROCEDURE — 73564 X-RAY EXAM KNEE 4 OR MORE: CPT | Mod: 50

## 2020-01-22 RX ORDER — HYALURONATE SODIUM 20 MG/2 ML
20 SYRINGE (ML) INTRAARTICULAR
Refills: 0 | Status: COMPLETED | OUTPATIENT
Start: 2020-01-22

## 2020-01-22 RX ADMIN — Medication 2 MG/2ML: at 00:00

## 2020-01-29 ENCOUNTER — APPOINTMENT (OUTPATIENT)
Dept: ORTHOPEDIC SURGERY | Facility: CLINIC | Age: 70
End: 2020-01-29
Payer: MEDICARE

## 2020-01-29 VITALS
DIASTOLIC BLOOD PRESSURE: 74 MMHG | HEIGHT: 69.5 IN | HEART RATE: 61 BPM | BODY MASS INDEX: 26.06 KG/M2 | SYSTOLIC BLOOD PRESSURE: 124 MMHG | WEIGHT: 180 LBS

## 2020-01-29 PROCEDURE — 20610 DRAIN/INJ JOINT/BURSA W/O US: CPT | Mod: 50

## 2020-01-29 RX ORDER — HYALURONATE SODIUM 20 MG/2 ML
20 SYRINGE (ML) INTRAARTICULAR
Refills: 0 | Status: COMPLETED | OUTPATIENT
Start: 2020-01-29

## 2020-01-29 RX ADMIN — Medication 2 MG/2ML: at 00:00

## 2020-02-03 ENCOUNTER — APPOINTMENT (OUTPATIENT)
Dept: ORTHOPEDIC SURGERY | Facility: CLINIC | Age: 70
End: 2020-02-03
Payer: MEDICARE

## 2020-02-03 VITALS — SYSTOLIC BLOOD PRESSURE: 114 MMHG | DIASTOLIC BLOOD PRESSURE: 75 MMHG | HEART RATE: 62 BPM

## 2020-02-03 DIAGNOSIS — M17.0 BILATERAL PRIMARY OSTEOARTHRITIS OF KNEE: ICD-10-CM

## 2020-02-03 PROCEDURE — 20610 DRAIN/INJ JOINT/BURSA W/O US: CPT | Mod: 50

## 2020-02-03 RX ORDER — HYALURONATE SODIUM 20 MG/2 ML
20 SYRINGE (ML) INTRAARTICULAR
Refills: 0 | Status: COMPLETED | OUTPATIENT
Start: 2020-02-03

## 2020-02-03 RX ADMIN — Medication 2 MG/2ML: at 00:00

## 2020-02-04 ENCOUNTER — RX RENEWAL (OUTPATIENT)
Age: 70
End: 2020-02-04

## 2020-02-26 ENCOUNTER — OUTPATIENT (OUTPATIENT)
Dept: OUTPATIENT SERVICES | Facility: HOSPITAL | Age: 70
LOS: 1 days | End: 2020-02-26
Payer: MEDICARE

## 2020-02-26 ENCOUNTER — APPOINTMENT (OUTPATIENT)
Dept: MRI IMAGING | Facility: CLINIC | Age: 70
End: 2020-02-26
Payer: MEDICARE

## 2020-02-26 DIAGNOSIS — Z98.890 OTHER SPECIFIED POSTPROCEDURAL STATES: Chronic | ICD-10-CM

## 2020-02-26 DIAGNOSIS — D03.9 MELANOMA IN SITU, UNSPECIFIED: Chronic | ICD-10-CM

## 2020-02-26 DIAGNOSIS — I71.00 DISSECTION OF UNSPECIFIED SITE OF AORTA: ICD-10-CM

## 2020-02-26 PROCEDURE — A9585: CPT

## 2020-02-26 PROCEDURE — C8902: CPT

## 2020-02-26 PROCEDURE — 74185 MRA ABD W OR W/O CNTRST: CPT | Mod: 26

## 2020-02-28 ENCOUNTER — APPOINTMENT (OUTPATIENT)
Dept: MRI IMAGING | Facility: CLINIC | Age: 70
End: 2020-02-28
Payer: MEDICARE

## 2020-02-28 ENCOUNTER — OUTPATIENT (OUTPATIENT)
Dept: OUTPATIENT SERVICES | Facility: HOSPITAL | Age: 70
LOS: 1 days | End: 2020-02-28
Payer: MEDICARE

## 2020-02-28 DIAGNOSIS — Z98.890 OTHER SPECIFIED POSTPROCEDURAL STATES: Chronic | ICD-10-CM

## 2020-02-28 DIAGNOSIS — D03.9 MELANOMA IN SITU, UNSPECIFIED: Chronic | ICD-10-CM

## 2020-02-28 DIAGNOSIS — I71.00 DISSECTION OF UNSPECIFIED SITE OF AORTA: ICD-10-CM

## 2020-02-28 PROCEDURE — C8911: CPT

## 2020-02-28 PROCEDURE — 71555 MRI ANGIO CHEST W OR W/O DYE: CPT | Mod: 26

## 2020-02-28 PROCEDURE — A9585: CPT

## 2020-03-02 ENCOUNTER — OUTPATIENT (OUTPATIENT)
Dept: OUTPATIENT SERVICES | Facility: HOSPITAL | Age: 70
LOS: 1 days | Discharge: HOME | End: 2020-03-02

## 2020-03-02 DIAGNOSIS — D03.9 MELANOMA IN SITU, UNSPECIFIED: Chronic | ICD-10-CM

## 2020-03-02 DIAGNOSIS — Z98.890 OTHER SPECIFIED POSTPROCEDURAL STATES: Chronic | ICD-10-CM

## 2020-03-02 DIAGNOSIS — D72.1 EOSINOPHILIA: ICD-10-CM

## 2020-03-09 ENCOUNTER — RESULT REVIEW (OUTPATIENT)
Age: 70
End: 2020-03-09

## 2020-03-09 ENCOUNTER — APPOINTMENT (OUTPATIENT)
Dept: HEMATOLOGY ONCOLOGY | Facility: CLINIC | Age: 70
End: 2020-03-09
Payer: MEDICARE

## 2020-03-09 VITALS
HEART RATE: 58 BPM | DIASTOLIC BLOOD PRESSURE: 87 MMHG | TEMPERATURE: 97.3 F | WEIGHT: 183.65 LBS | RESPIRATION RATE: 16 BRPM | SYSTOLIC BLOOD PRESSURE: 144 MMHG | BODY MASS INDEX: 26.73 KG/M2 | OXYGEN SATURATION: 99 %

## 2020-03-09 LAB
BASOPHILS # BLD AUTO: 0.06 K/UL — SIGNIFICANT CHANGE UP (ref 0–0.2)
BASOPHILS NFR BLD AUTO: 0.8 % — SIGNIFICANT CHANGE UP (ref 0–2)
EOSINOPHIL # BLD AUTO: 0.21 K/UL — SIGNIFICANT CHANGE UP (ref 0–0.5)
EOSINOPHIL NFR BLD AUTO: 2.7 % — SIGNIFICANT CHANGE UP (ref 0–6)
HCT VFR BLD CALC: 49.6 % — SIGNIFICANT CHANGE UP (ref 39–50)
HGB BLD-MCNC: 16.4 G/DL — SIGNIFICANT CHANGE UP (ref 13–17)
IMM GRANULOCYTES NFR BLD AUTO: 0.4 % — SIGNIFICANT CHANGE UP (ref 0–1.5)
LYMPHOCYTES # BLD AUTO: 2.4 K/UL — SIGNIFICANT CHANGE UP (ref 1–3.3)
LYMPHOCYTES # BLD AUTO: 31.3 % — SIGNIFICANT CHANGE UP (ref 13–44)
MCHC RBC-ENTMCNC: 29.7 PG — SIGNIFICANT CHANGE UP (ref 27–34)
MCHC RBC-ENTMCNC: 33.1 GM/DL — SIGNIFICANT CHANGE UP (ref 32–36)
MCV RBC AUTO: 89.7 FL — SIGNIFICANT CHANGE UP (ref 80–100)
MONOCYTES # BLD AUTO: 0.72 K/UL — SIGNIFICANT CHANGE UP (ref 0–0.9)
MONOCYTES NFR BLD AUTO: 9.4 % — SIGNIFICANT CHANGE UP (ref 2–14)
NEUTROPHILS # BLD AUTO: 4.25 K/UL — SIGNIFICANT CHANGE UP (ref 1.8–7.4)
NEUTROPHILS NFR BLD AUTO: 55.4 % — SIGNIFICANT CHANGE UP (ref 43–77)
NRBC # BLD: 0 /100 WBCS — SIGNIFICANT CHANGE UP (ref 0–0)
PLATELET # BLD AUTO: 209 K/UL — SIGNIFICANT CHANGE UP (ref 150–400)
RBC # BLD: 5.53 M/UL — SIGNIFICANT CHANGE UP (ref 4.2–5.8)
RBC # FLD: 13 % — SIGNIFICANT CHANGE UP (ref 10.3–14.5)
WBC # BLD: 7.67 K/UL — SIGNIFICANT CHANGE UP (ref 3.8–10.5)
WBC # FLD AUTO: 7.67 K/UL — SIGNIFICANT CHANGE UP (ref 3.8–10.5)

## 2020-03-09 PROCEDURE — 99214 OFFICE O/P EST MOD 30 MIN: CPT

## 2020-03-09 NOTE — PHYSICAL EXAM
[Restricted in physically strenuous activity but ambulatory and able to carry out work of a light or sedentary nature] : Status 1- Restricted in physically strenuous activity but ambulatory and able to carry out work of a light or sedentary nature, e.g., light house work, office work [Normal] : affect appropriate [Ulcers] : no ulcers [Mucositis] : no mucositis [Thrush] : no thrush [de-identified] : midline chest scar

## 2020-03-09 NOTE — ASSESSMENT
[FreeTextEntry1] : 70 y/o M with history of aortic dissection in 2016, longstanding history of HTN, and peripheral neuropathy in feet ever since his aortic dissection surgery here for follow up of polycythemia. \par \par -Repeat H/H today in office once again normal.\par -No symptoms of primary MPN. \par -Unclear etiology of patient's elevated H/H 6 months ago, but on further evaluation this does not appear to be an MPN. Patient does NOT have polycythemia vera. \par -Serum FLC ratio minimally elevated but with no monoclonal signal on CURLY. Will repeat today. Unlikely clinically relevant. \par -Follow up in 6 months.

## 2020-03-09 NOTE — HISTORY OF PRESENT ILLNESS
[de-identified] : 68 y/o M with longstanding history of hypertension, with aortic dissection in 2016 with prolonged hospital course, was very critically ill at the time, now with persistent fatigue and exercise intolerance referred to me for polycythemia on lab work. Patient mentions that he was initially under the impression that it was because he was dehydrated, but his doctor became concerned when his electrolytes didn't support that. He reports he remembers being told in the past it was slightly elevated but fluctuated to normal. No flushing or pruritus after warm shower. Denies any chest pains or dyspnea at rest, only has dyspnea on exertion ever since his incident with the aortic dissection. No thrombosis in the past. No family history of hematologic disorders. Patient is a non-smoker, wife reports he does not snore and he has no trouble sleeping, no frequent night-time awakening. \par \par Patient erythropoietin level at 13.1, Oxygen p50 normal. Following up today.  [de-identified] : Patient with multiple interval checks of hemoglobin which have normalized. He reports the neuropathy in his feet has been stable. He reports no further episodes of syncope since December. He reports no chest pain or palpitations, and he does not have any shortness of breath at this point. No pruritus.

## 2020-03-11 LAB
DEPRECATED KAPPA LC FREE/LAMBDA SER: 1.96 RATIO
KAPPA LC CSF-MCNC: 1.19 MG/DL
KAPPA LC SERPL-MCNC: 2.33 MG/DL

## 2020-03-21 ENCOUNTER — RX RENEWAL (OUTPATIENT)
Age: 70
End: 2020-03-21

## 2020-04-21 ENCOUNTER — NON-APPOINTMENT (OUTPATIENT)
Age: 70
End: 2020-04-21

## 2020-04-21 ENCOUNTER — APPOINTMENT (OUTPATIENT)
Dept: INTERNAL MEDICINE | Facility: CLINIC | Age: 70
End: 2020-04-21
Payer: MEDICARE

## 2020-04-21 VITALS
BODY MASS INDEX: 26.07 KG/M2 | WEIGHT: 176 LBS | SYSTOLIC BLOOD PRESSURE: 110 MMHG | HEIGHT: 69 IN | DIASTOLIC BLOOD PRESSURE: 60 MMHG

## 2020-04-21 DIAGNOSIS — R22.32 LOCALIZED SWELLING, MASS AND LUMP, LEFT UPPER LIMB: ICD-10-CM

## 2020-04-21 DIAGNOSIS — I71.00 DISSECTION OF UNSPECIFIED SITE OF AORTA: ICD-10-CM

## 2020-04-21 DIAGNOSIS — Z85.820 PERSONAL HISTORY OF MALIGNANT MELANOMA OF SKIN: ICD-10-CM

## 2020-04-21 DIAGNOSIS — M70.21 OLECRANON BURSITIS, RIGHT ELBOW: ICD-10-CM

## 2020-04-21 DIAGNOSIS — K21.9 GASTRO-ESOPHAGEAL REFLUX DISEASE W/OUT ESOPHAGITIS: ICD-10-CM

## 2020-04-21 DIAGNOSIS — M77.9 ENTHESOPATHY, UNSPECIFIED: ICD-10-CM

## 2020-04-21 DIAGNOSIS — Z87.39 PERSONAL HISTORY OF OTHER DISEASES OF THE MUSCULOSKELETAL SYSTEM AND CONNECTIVE TISSUE: ICD-10-CM

## 2020-04-21 PROCEDURE — G0439: CPT

## 2020-04-21 PROCEDURE — 93000 ELECTROCARDIOGRAM COMPLETE: CPT

## 2020-04-21 PROCEDURE — 36415 COLL VENOUS BLD VENIPUNCTURE: CPT

## 2020-04-22 LAB
APPEARANCE: CLEAR
BASOPHILS # BLD AUTO: 0.09 K/UL
BASOPHILS NFR BLD AUTO: 1 %
BILIRUBIN URINE: NEGATIVE
BLOOD URINE: NEGATIVE
CHOLEST SERPL-MCNC: 154 MG/DL
CHOLEST/HDLC SERPL: 4.7 RATIO
COLOR: NORMAL
EOSINOPHIL # BLD AUTO: 0.28 K/UL
EOSINOPHIL NFR BLD AUTO: 3.2 %
GLUCOSE QUALITATIVE U: NEGATIVE
HCT VFR BLD CALC: 49.8 %
HDLC SERPL-MCNC: 33 MG/DL
HGB BLD-MCNC: 16.7 G/DL
IMM GRANULOCYTES NFR BLD AUTO: 0.3 %
KETONES URINE: NEGATIVE
LDLC SERPL CALC-MCNC: 66 MG/DL
LEUKOCYTE ESTERASE URINE: NEGATIVE
LYMPHOCYTES # BLD AUTO: 2.77 K/UL
LYMPHOCYTES NFR BLD AUTO: 31.4 %
MAN DIFF?: NORMAL
MCHC RBC-ENTMCNC: 29.9 PG
MCHC RBC-ENTMCNC: 33.5 GM/DL
MCV RBC AUTO: 89.1 FL
MONOCYTES # BLD AUTO: 0.89 K/UL
MONOCYTES NFR BLD AUTO: 10.1 %
NEUTROPHILS # BLD AUTO: 4.77 K/UL
NEUTROPHILS NFR BLD AUTO: 54 %
NITRITE URINE: NEGATIVE
PH URINE: 6
PLATELET # BLD AUTO: 253 K/UL
PROTEIN URINE: NEGATIVE
PSA SERPL-MCNC: 3.51 NG/ML
RBC # BLD: 5.59 M/UL
RBC # FLD: 13 %
SPECIFIC GRAVITY URINE: 1.01
T3 SERPL-MCNC: 79 NG/DL
T4 FREE SERPL-MCNC: 1.2 NG/DL
TRIGL SERPL-MCNC: 276 MG/DL
TSH SERPL-ACNC: 3.34 UIU/ML
UROBILINOGEN URINE: NORMAL
WBC # FLD AUTO: 8.83 K/UL

## 2020-04-24 ENCOUNTER — TRANSCRIPTION ENCOUNTER (OUTPATIENT)
Age: 70
End: 2020-04-24

## 2020-04-24 LAB
ALBUMIN SERPL ELPH-MCNC: 4.6 G/DL
ALP BLD-CCNC: 85 U/L
ALT SERPL-CCNC: 34 U/L
ANION GAP SERPL CALC-SCNC: 13 MMOL/L
AST SERPL-CCNC: 28 U/L
BILIRUB SERPL-MCNC: 0.6 MG/DL
BUN SERPL-MCNC: 46 MG/DL
CALCIUM SERPL-MCNC: 10.8 MG/DL
CHLORIDE SERPL-SCNC: 101 MMOL/L
CO2 SERPL-SCNC: 25 MMOL/L
CREAT SERPL-MCNC: 1.77 MG/DL
GLUCOSE SERPL-MCNC: 105 MG/DL
POTASSIUM SERPL-SCNC: 5.3 MMOL/L
PROT SERPL-MCNC: 6.5 G/DL
SODIUM SERPL-SCNC: 139 MMOL/L

## 2020-04-25 ENCOUNTER — APPOINTMENT (OUTPATIENT)
Dept: INTERNAL MEDICINE | Facility: CLINIC | Age: 70
End: 2020-04-25
Payer: MEDICARE

## 2020-04-25 PROCEDURE — 36415 COLL VENOUS BLD VENIPUNCTURE: CPT

## 2020-04-27 LAB
CREAT SPEC-SCNC: 85 MG/DL
CREAT/PROT UR: 0.2 RATIO
PROT UR-MCNC: 15 MG/DL
SODIUM ?TM SUB UR QN: <20 MMOL/L

## 2020-04-28 LAB
ANION GAP SERPL CALC-SCNC: 12 MMOL/L
BUN SERPL-MCNC: 36 MG/DL
CALCIUM SERPL-MCNC: 10.4 MG/DL
CHLORIDE SERPL-SCNC: 95 MMOL/L
CO2 SERPL-SCNC: 26 MMOL/L
CREAT SERPL-MCNC: 1.56 MG/DL
CREAT SPEC-SCNC: 85 MG/DL
GLUCOSE SERPL-MCNC: 102 MG/DL
MICROALBUMIN 24H UR DL<=1MG/L-MCNC: 6.5 MG/DL
MICROALBUMIN/CREAT 24H UR-RTO: 76 MG/G
POTASSIUM SERPL-SCNC: 4.9 MMOL/L
SODIUM SERPL-SCNC: 133 MMOL/L

## 2020-05-06 ENCOUNTER — APPOINTMENT (OUTPATIENT)
Dept: CARDIOLOGY | Facility: CLINIC | Age: 70
End: 2020-05-06
Payer: MEDICARE

## 2020-05-06 VITALS — WEIGHT: 175 LBS | BODY MASS INDEX: 25.84 KG/M2 | DIASTOLIC BLOOD PRESSURE: 76 MMHG | SYSTOLIC BLOOD PRESSURE: 117 MMHG

## 2020-05-06 DIAGNOSIS — E66.3 OVERWEIGHT: ICD-10-CM

## 2020-05-06 PROCEDURE — 99214 OFFICE O/P EST MOD 30 MIN: CPT | Mod: 95

## 2020-05-14 DIAGNOSIS — Z87.440 PERSONAL HISTORY OF URINARY (TRACT) INFECTIONS: ICD-10-CM

## 2020-05-21 LAB
APPEARANCE: CLEAR
BACTERIA: NEGATIVE
BILIRUBIN URINE: NEGATIVE
BLOOD URINE: NEGATIVE
COLOR: YELLOW
GLUCOSE QUALITATIVE U: NEGATIVE
HYALINE CASTS: 3 /LPF
KETONES URINE: NEGATIVE
LEUKOCYTE ESTERASE URINE: NEGATIVE
MICROSCOPIC-UA: NORMAL
NITRITE URINE: NEGATIVE
PH URINE: 6.5
PROTEIN URINE: NORMAL
RED BLOOD CELLS URINE: 2 /HPF
SPECIFIC GRAVITY URINE: 1.01
SQUAMOUS EPITHELIAL CELLS: 0 /HPF
UROBILINOGEN URINE: NORMAL
WHITE BLOOD CELLS URINE: 1 /HPF

## 2020-08-13 ENCOUNTER — APPOINTMENT (OUTPATIENT)
Dept: CARDIOLOGY | Facility: CLINIC | Age: 70
End: 2020-08-13
Payer: MEDICARE

## 2020-08-13 VITALS
SYSTOLIC BLOOD PRESSURE: 133 MMHG | HEART RATE: 64 BPM | TEMPERATURE: 96.3 F | HEIGHT: 69 IN | OXYGEN SATURATION: 99 % | BODY MASS INDEX: 27.25 KG/M2 | WEIGHT: 184 LBS | DIASTOLIC BLOOD PRESSURE: 65 MMHG

## 2020-08-13 PROCEDURE — 93000 ELECTROCARDIOGRAM COMPLETE: CPT

## 2020-08-13 PROCEDURE — 99214 OFFICE O/P EST MOD 30 MIN: CPT

## 2020-08-20 LAB
ALBUMIN SERPL ELPH-MCNC: 4.5 G/DL
ALP BLD-CCNC: 88 U/L
ALT SERPL-CCNC: 23 U/L
ANION GAP SERPL CALC-SCNC: 13 MMOL/L
AST SERPL-CCNC: 19 U/L
BASOPHILS # BLD AUTO: 0.05 K/UL
BASOPHILS NFR BLD AUTO: 0.7 %
BILIRUB SERPL-MCNC: 0.8 MG/DL
BUN SERPL-MCNC: 40 MG/DL
CALCIUM SERPL-MCNC: 10.5 MG/DL
CHLORIDE SERPL-SCNC: 103 MMOL/L
CHOLEST SERPL-MCNC: 136 MG/DL
CHOLEST/HDLC SERPL: 3.7 RATIO
CO2 SERPL-SCNC: 27 MMOL/L
CREAT SERPL-MCNC: 1.86 MG/DL
EOSINOPHIL # BLD AUTO: 0.18 K/UL
EOSINOPHIL NFR BLD AUTO: 2.4 %
ESTIMATED AVERAGE GLUCOSE: 94 MG/DL
GLUCOSE SERPL-MCNC: 87 MG/DL
HBA1C MFR BLD HPLC: 4.9 %
HCT VFR BLD CALC: 50 %
HDLC SERPL-MCNC: 37 MG/DL
HGB BLD-MCNC: 16.2 G/DL
IMM GRANULOCYTES NFR BLD AUTO: 0.4 %
LDLC SERPL CALC-MCNC: 76 MG/DL
LYMPHOCYTES # BLD AUTO: 2.17 K/UL
LYMPHOCYTES NFR BLD AUTO: 28.4 %
MAN DIFF?: NORMAL
MCHC RBC-ENTMCNC: 29.5 PG
MCHC RBC-ENTMCNC: 32.4 GM/DL
MCV RBC AUTO: 90.9 FL
MONOCYTES # BLD AUTO: 0.89 K/UL
MONOCYTES NFR BLD AUTO: 11.6 %
NEUTROPHILS # BLD AUTO: 4.33 K/UL
NEUTROPHILS NFR BLD AUTO: 56.5 %
PLATELET # BLD AUTO: 172 K/UL
POTASSIUM SERPL-SCNC: 4.5 MMOL/L
PROT SERPL-MCNC: 6.7 G/DL
PSA SERPL-MCNC: 3.95 NG/ML
RBC # BLD: 5.5 M/UL
RBC # FLD: 13.2 %
SARS-COV-2 IGG SERPL IA-ACNC: <0.1 INDEX
SARS-COV-2 IGG SERPL QL IA: NEGATIVE
SODIUM SERPL-SCNC: 142 MMOL/L
TRIGL SERPL-MCNC: 113 MG/DL
TSH SERPL-ACNC: 2.72 UIU/ML
WBC # FLD AUTO: 7.65 K/UL

## 2020-08-26 ENCOUNTER — OUTPATIENT (OUTPATIENT)
Dept: OUTPATIENT SERVICES | Facility: HOSPITAL | Age: 70
LOS: 1 days | End: 2020-08-26
Payer: MEDICARE

## 2020-08-26 DIAGNOSIS — D03.9 MELANOMA IN SITU, UNSPECIFIED: Chronic | ICD-10-CM

## 2020-08-26 DIAGNOSIS — Z98.890 OTHER SPECIFIED POSTPROCEDURAL STATES: Chronic | ICD-10-CM

## 2020-08-26 DIAGNOSIS — R00.2 PALPITATIONS: ICD-10-CM

## 2020-08-26 PROCEDURE — 93227 XTRNL ECG REC<48 HR R&I: CPT

## 2020-09-08 ENCOUNTER — TRANSCRIPTION ENCOUNTER (OUTPATIENT)
Age: 70
End: 2020-09-08

## 2020-09-10 ENCOUNTER — OUTPATIENT (OUTPATIENT)
Dept: OUTPATIENT SERVICES | Facility: HOSPITAL | Age: 70
LOS: 1 days | Discharge: HOME | End: 2020-09-10

## 2020-09-10 DIAGNOSIS — Z98.890 OTHER SPECIFIED POSTPROCEDURAL STATES: Chronic | ICD-10-CM

## 2020-09-10 DIAGNOSIS — D75.1 SECONDARY POLYCYTHEMIA: ICD-10-CM

## 2020-09-10 DIAGNOSIS — D03.9 MELANOMA IN SITU, UNSPECIFIED: Chronic | ICD-10-CM

## 2020-09-11 ENCOUNTER — NON-APPOINTMENT (OUTPATIENT)
Age: 70
End: 2020-09-11

## 2020-09-11 ENCOUNTER — APPOINTMENT (OUTPATIENT)
Dept: ELECTROPHYSIOLOGY | Facility: CLINIC | Age: 70
End: 2020-09-11
Payer: MEDICARE

## 2020-09-11 VITALS — SYSTOLIC BLOOD PRESSURE: 137 MMHG | DIASTOLIC BLOOD PRESSURE: 85 MMHG

## 2020-09-11 VITALS
HEIGHT: 69 IN | DIASTOLIC BLOOD PRESSURE: 103 MMHG | OXYGEN SATURATION: 100 % | HEART RATE: 69 BPM | TEMPERATURE: 97.2 F | BODY MASS INDEX: 25.84 KG/M2 | SYSTOLIC BLOOD PRESSURE: 163 MMHG

## 2020-09-11 VITALS — WEIGHT: 175 LBS | BODY MASS INDEX: 25.84 KG/M2

## 2020-09-11 PROCEDURE — 93000 ELECTROCARDIOGRAM COMPLETE: CPT

## 2020-09-11 PROCEDURE — 99204 OFFICE O/P NEW MOD 45 MIN: CPT

## 2020-09-11 NOTE — HISTORY OF PRESENT ILLNESS
[FreeTextEntry1] : Brett Gayle MD\par \par Porfirio Hanley is a 68y/o man with Hx of HTN, HLD, GERD, thoracic aortic aneurysm s/p repair, and recent palpitations who presents today for initial evaluation. Admits doing well but recently had palpitations after being taken off of metoprolol. Underwent Holter monitoring which revealed brief runs of AT/possible afib up to 187 bpm and frequent monomorphic PVCs (2% burden) with one run of NSVT, 5 beats at 164bpm. Now back on metoprolol, 25mg BID, without recurrence of symptoms. Denies chest pain, SOB, syncope or near syncope.\par

## 2020-09-11 NOTE — DISCUSSION/SUMMARY
[FreeTextEntry1] : Porfirio Hanley is a 70y/o man with Hx of HTN, HLD, GERD, thoracic aortic aneurysm s/p repair, and recent palpitations who presents today for initial evaluation. \par \par Impression:\par \par 1. PAT: EKG today NSR. Review of recent Holter with evidence of brief runs of PAT/possible afib. Now on beta blockers. Recommend ILR placement for long term monitoring of possible afib. Risks, benefits, and alternatives discussed. \par \par 2. PVCs: 2% PVC burden on recent Holter. Consider ILR, now with PVC counter, to continue to monitor PVC burden. Resume metoprolol as prescribed. \par \par 3. HTN: resume oral antihypertensives as prescribed. Encouraged heart healthy diet, sodium restriction, and weight loss. Continue regular f/u with Cardiologist for further HTN management.\par \par 4. HLD: resume statin therapy as prescribed and regular f/u with Cardiologist for routine lipid monitoring and management.\par \par Sincerely,\par \par Thomas Pastor MD

## 2020-09-14 ENCOUNTER — TRANSCRIPTION ENCOUNTER (OUTPATIENT)
Age: 70
End: 2020-09-14

## 2020-09-14 ENCOUNTER — RESULT REVIEW (OUTPATIENT)
Age: 70
End: 2020-09-14

## 2020-09-14 ENCOUNTER — APPOINTMENT (OUTPATIENT)
Dept: DISASTER EMERGENCY | Facility: CLINIC | Age: 70
End: 2020-09-14

## 2020-09-14 ENCOUNTER — APPOINTMENT (OUTPATIENT)
Dept: HEMATOLOGY ONCOLOGY | Facility: CLINIC | Age: 70
End: 2020-09-14
Payer: MEDICARE

## 2020-09-14 VITALS
WEIGHT: 176.37 LBS | DIASTOLIC BLOOD PRESSURE: 91 MMHG | HEART RATE: 68 BPM | RESPIRATION RATE: 14 BRPM | SYSTOLIC BLOOD PRESSURE: 160 MMHG | OXYGEN SATURATION: 99 % | BODY MASS INDEX: 26.05 KG/M2 | TEMPERATURE: 98 F

## 2020-09-14 LAB
BASOPHILS # BLD AUTO: 0.05 K/UL — SIGNIFICANT CHANGE UP (ref 0–0.2)
BASOPHILS NFR BLD AUTO: 0.6 % — SIGNIFICANT CHANGE UP (ref 0–2)
EOSINOPHIL # BLD AUTO: 0.14 K/UL — SIGNIFICANT CHANGE UP (ref 0–0.5)
EOSINOPHIL NFR BLD AUTO: 1.7 % — SIGNIFICANT CHANGE UP (ref 0–6)
HCT VFR BLD CALC: 45.3 % — SIGNIFICANT CHANGE UP (ref 39–50)
HGB BLD-MCNC: 15.3 G/DL — SIGNIFICANT CHANGE UP (ref 13–17)
IMM GRANULOCYTES NFR BLD AUTO: 0.2 % — SIGNIFICANT CHANGE UP (ref 0–1.5)
LYMPHOCYTES # BLD AUTO: 2.12 K/UL — SIGNIFICANT CHANGE UP (ref 1–3.3)
LYMPHOCYTES # BLD AUTO: 25.9 % — SIGNIFICANT CHANGE UP (ref 13–44)
MCHC RBC-ENTMCNC: 30.1 PG — SIGNIFICANT CHANGE UP (ref 27–34)
MCHC RBC-ENTMCNC: 33.8 G/DL — SIGNIFICANT CHANGE UP (ref 32–36)
MCV RBC AUTO: 89 FL — SIGNIFICANT CHANGE UP (ref 80–100)
MONOCYTES # BLD AUTO: 0.74 K/UL — SIGNIFICANT CHANGE UP (ref 0–0.9)
MONOCYTES NFR BLD AUTO: 9 % — SIGNIFICANT CHANGE UP (ref 2–14)
NEUTROPHILS # BLD AUTO: 5.13 K/UL — SIGNIFICANT CHANGE UP (ref 1.8–7.4)
NEUTROPHILS NFR BLD AUTO: 62.6 % — SIGNIFICANT CHANGE UP (ref 43–77)
NRBC # BLD: 0 /100 WBCS — SIGNIFICANT CHANGE UP (ref 0–0)
PLATELET # BLD AUTO: 192 K/UL — SIGNIFICANT CHANGE UP (ref 150–400)
RBC # BLD: 5.09 M/UL — SIGNIFICANT CHANGE UP (ref 4.2–5.8)
RBC # FLD: 13.1 % — SIGNIFICANT CHANGE UP (ref 10.3–14.5)
WBC # BLD: 8.2 K/UL — SIGNIFICANT CHANGE UP (ref 3.8–10.5)
WBC # FLD AUTO: 8.2 K/UL — SIGNIFICANT CHANGE UP (ref 3.8–10.5)

## 2020-09-14 PROCEDURE — 99213 OFFICE O/P EST LOW 20 MIN: CPT

## 2020-09-14 NOTE — HISTORY OF PRESENT ILLNESS
[de-identified] : 68 y/o M with longstanding history of hypertension, with aortic dissection in 2016 with prolonged hospital course, was very critically ill at the time, now with persistent fatigue and exercise intolerance referred to me for polycythemia on lab work. Patient mentions that he was initially under the impression that it was because he was dehydrated, but his doctor became concerned when his electrolytes didn't support that. He reports he remembers being told in the past it was slightly elevated but fluctuated to normal. No flushing or pruritus after warm shower. Denies any chest pains or dyspnea at rest, only has dyspnea on exertion ever since his incident with the aortic dissection. No thrombosis in the past. No family history of hematologic disorders. Patient is a non-smoker, wife reports he does not snore and he has no trouble sleeping, no frequent night-time awakening. \par \par Patient erythropoietin level at 13.1, Oxygen p50 normal. Following up today.  [de-identified] : Patient with recent syncopal episode. Followed up with cardiologist and diagnosed with symptomatic bradycardia due to very high dose of metoprolol. This was stopped but started having palpitations, so now being evaluated by EP for possible loop recorder. Now on low dose metoprolol.

## 2020-09-14 NOTE — PHYSICAL EXAM
[Restricted in physically strenuous activity but ambulatory and able to carry out work of a light or sedentary nature] : Status 1- Restricted in physically strenuous activity but ambulatory and able to carry out work of a light or sedentary nature, e.g., light house work, office work [Ulcers] : no ulcers [Mucositis] : no mucositis [Thrush] : no thrush [Normal] : affect appropriate [de-identified] : midline chest scar

## 2020-09-14 NOTE — ASSESSMENT
[FreeTextEntry1] : 68 y/o M with history of aortic dissection in 2016, longstanding history of HTN, and peripheral neuropathy in feet ever since his aortic dissection surgery here for follow up of polycythemia. \par \par -Repeat H/H today in office once again normal.\par -No symptoms of primary MPN. \par -Unclear etiology of patient's elevated H/H 1 year ago, but on further evaluation this does not appear to be an MPN. Patient does NOT have polycythemia vera. \par -Follow up in 6 months.

## 2020-09-14 NOTE — DISCHARGE NOTE ADULT - DISCHARGE DATE
FOLLOW UP NOTE:     CHIEF COMPLAINT: back pain     INITIAL HISTORY OF PRESENT ILLNESS: Beatriz Gan is a 64 y.o. female with PMH significant for hx of left rotator cuff repair (Dr. Martinez), hx of left arthroscopic knee surgery, hx of partial nephrectomy, hx of ETOH dependence, and HTN presents as a referral for the evaluation of low back pain. Patient reports that her pain approximately in 2014 after no inciting incident or trauma. The patient reports that she experienced a new type of mid back pain when she slipped and fell at home approximately 1 week prior to her kyphoplasty with me. Today, the patient wants her low back pain addressed as her mid back pain resolved s/p kyphoplasty. The patient localizes her pain to the area across her lower back. Patient denies of radiation of her pain (the patient reports of having pain previously that radiated towards her left foot but states that her injections via Dr. Leger resolved her radicular pain). Patient describes her pain as a shooting, throbbing, and burning type of pain. Patient reports that her pain current pain is a 10/10. Patient denies of any urinary/fecal incontinence, saddle anesthesia, or weakness.      Of note, the patient reports that she fell 1 week prior to her T12 kyphoplasty with me on 1/31/2020. Patient was in discussion to her lumbar spine surgery with Dr. Martinez but she fell prior to scheduling a procedure date.     Aggravating factors: constant pain; activity in general, sitting     Mitigating factors: ice    INTERVAL HISTORY OF PRESENT ILLNESS: Beatriz Gan is a 65 y.o. female with PMH significant for hx of left rotator cuff repair (Dr. Martinez), hx of left arthroscopic knee surgery, hx of partial nephrectomy, hx of ETOH dependence, HTN, and hx of left total knee arthroplasty (7/30/2020; Dr. Ruben Martinez) presents for the continued management of low back pain. Today, the patient reports of pain across the area of her lower back.  "The patient believes that her current pain is a recurrence of her back pain that previously responded well to previous RFA's. The patient has had a left total knee replacement since her last clinic visit with me. The patient reports that she has been performing post-operative rehab since her surgery. Patient denies of any urinary/fecal incontinence, saddle anesthesia, or weakness.     INTERVENTIONAL PAIN HISTORY:  6/19/2020: Radiofrequency Ablations of Superior lateral, Superior medial and Inferior medial genicular nerves, left-sided   3/11/2020: hylan supplementation via Dr. Ruben Martinez - no relief  2/28/2020: Bilateral L3, L4, and L5 medial branch radiofrequency ablation   8/21/2017: Right sacroiliac joint injection   4/10/2017: C7-T1 cervical interlaminar epidural steroid injection - 50% relief per chart review  2/6/2017: Right sacroiliac joint injection   8/2016: Lumbar RFA - good relief     CURRENT PAIN MEDICATIONS:   Gabapentin 400 mg PO TID (higher doses caused GI upset)  duloxetine 30 mg PO q day  Norco 5-325 mg PO daily PRN    ROS:  Review of Systems   Constitutional: Negative for chills and fever.   HENT: Negative for sore throat.    Eyes: Negative for visual disturbance.   Respiratory: Negative for shortness of breath.    Cardiovascular: Negative for chest pain.   Gastrointestinal: Negative for nausea and vomiting.   Genitourinary: Negative for difficulty urinating.   Musculoskeletal: Positive for arthralgias and back pain.   Skin: Negative for rash.   Allergic/Immunologic: Negative for immunocompromised state.   Neurological: Negative for syncope.   Hematological: Does not bruise/bleed easily.   Psychiatric/Behavioral: Negative for suicidal ideas.        MEDICAL, SURGICAL, FAMILY, SOCIAL HX: reviewed    MEDICATIONS/ALLERGIES: reviewed    PHYSICAL EXAM:    VITALS: Vitals reviewed.   Vitals:    09/14/20 1103   BP: 135/88   Pulse: 73   Weight: 86.2 kg (190 lb 0.6 oz)   Height: 5' 4" (1.626 m)   PainSc:   5 "   PainLoc: Back       Physical Exam   Constitutional: She is oriented to person, place, and time and well-developed, well-nourished, and in no distress.   HENT:   Head: Normocephalic and atraumatic.   Eyes: Conjunctivae and EOM are normal. Right eye exhibits no discharge. Left eye exhibits no discharge.   Cardiovascular: Normal rate.   Pulmonary/Chest: Effort normal and breath sounds normal. No respiratory distress.   Abdominal: Soft.   Neurological: She is alert and oriented to person, place, and time.   Skin: Skin is warm and dry. No rash noted. She is not diaphoretic.   Psychiatric: Mood, memory, affect and judgment normal.   Nursing note and vitals reviewed.     UPPER EXTREMITIES: Normal alignment, normal range of motion, no atrophy, no skin changes,  hair growth and nail growth normal and equal bilaterally. No swelling, no tenderness.    LOWER EXTREMITIES:  Normal alignment, normal range of motion, no atrophy, no skin changes,  hair growth and nail growth normal and equal bilaterally. No swelling, no tenderness.     LUMBAR SPINE  Lumbar spine: ROM is severely limited with flexion extension and oblique extension with increased pain (worse with extension).     ((--)) Supine straight leg raise    ((+)) Facet loading   Internal and external rotation of the hip causes no increased pain on either side.  Myofascial exam: Tenderness to palpation across lumbar paraspinous muscles.     ((+)) TTP at the SI joint  ((+)) AYLEEN's test  One leg stand: cannot perform secondary to fall risk    ((+)) Distraction test     MOTOR: Tone and bulk: normal bilateral upper and lower Strength: normal    Delt      Bi         Tri        WE      WF                        R          5          5          5          5          5          5            L          5          5          5          5          5          5               IP         ADD     ABD     Quad   TA        Gas      HAM  R          5          5          5          5          5          5          5  L          5          5          5          5          5          5          5     SENSATION: Light touch and pinprick intact bilaterally  REFLEXES: normal, symmetric, nonbrisk.  Toes down, no clonus. Negative nicholson's sign bilaterally.  GAIT: normal rise, base, steps, and arm swing.      IMAGING: no new imaging of the spine to review    ASSESSMENT: Beatriz Gan is a 65 y.o. female with PMH significant for hx of left rotator cuff repair (Dr. Martinez), hx of left arthroscopic knee surgery, hx of partial nephrectomy, hx of ETOH dependence, HTN, and hx of left total knee arthroplasty (7/30/2020; Dr. Ruben Martinez) presents for the continued management of low back pain. Today, the patient reports of pain across the area of her lower back. The patient believes that her current pain is a recurrence of her back pain that previously responded well to previous RFA's. Treatment plan outlined below.     PLAN:  1. Schedule for repeat radiofrequency ablation of the L3, L4, L5 medial branch nerves on the bilateral-side   2. Will request prior authorization for etodolac.   3. I have stressed the importance of physical activity and a home exercise plan to help with chronic pain and improve health.  4. RTC for the procedure as outlined above     Dk Rosales MD  Pain Management             27-Apr-2017

## 2020-09-15 LAB — SARS-COV-2 N GENE NPH QL NAA+PROBE: NOT DETECTED

## 2020-09-16 LAB
DEPRECATED KAPPA LC FREE/LAMBDA SER: 2.51 RATIO
KAPPA LC CSF-MCNC: 0.98 MG/DL
KAPPA LC SERPL-MCNC: 2.46 MG/DL

## 2020-09-17 ENCOUNTER — OUTPATIENT (OUTPATIENT)
Dept: OUTPATIENT SERVICES | Facility: HOSPITAL | Age: 70
LOS: 1 days | Discharge: HOME | End: 2020-09-17
Payer: MEDICARE

## 2020-09-17 DIAGNOSIS — Z98.890 OTHER SPECIFIED POSTPROCEDURAL STATES: Chronic | ICD-10-CM

## 2020-09-17 DIAGNOSIS — I47.1 SUPRAVENTRICULAR TACHYCARDIA: ICD-10-CM

## 2020-09-17 DIAGNOSIS — D03.9 MELANOMA IN SITU, UNSPECIFIED: Chronic | ICD-10-CM

## 2020-09-17 PROCEDURE — 33285 INSJ SUBQ CAR RHYTHM MNTR: CPT

## 2020-09-17 NOTE — H&P CARDIOLOGY - HISTORY OF PRESENT ILLNESS
69 year old male with HTN, hyperlipidemia, GERD, thoracic aortic aneurysm repair who complains of palpitations with holter monitor revealing afib/aflutter and PVC burden who presents for loop recorder implant.     please see hard copy H&P in paper chart 9/11/2020  PATIENT SEEN AND EXAMINED AND NO NEW CLINICAL CHANGES SINCE office visit    Denies fever, cough, chills, headache, flu like symptoms, sick contact or recent travel  COVID PCR not detected on 9/14/2020

## 2020-09-17 NOTE — H&P CARDIOLOGY - PSH
History of tonsillectomy and adenoidectomy  at age 3  Melanoma in situ  Excision of menaloma of right arm; 2014  S/P aortic dissection repair

## 2020-09-17 NOTE — CHART NOTE - NSCHARTNOTEFT_GEN_A_CORE
Patient is s/p ILR implant. Device site clean with no bleeding or hematoma. Device teaching given to patient. All questions answered. F/U appointment with device clinic in two weeks

## 2020-09-30 ENCOUNTER — APPOINTMENT (OUTPATIENT)
Dept: ELECTROPHYSIOLOGY | Facility: CLINIC | Age: 70
End: 2020-09-30

## 2020-10-23 ENCOUNTER — APPOINTMENT (OUTPATIENT)
Dept: ELECTROPHYSIOLOGY | Facility: CLINIC | Age: 70
End: 2020-10-23
Payer: MEDICARE

## 2020-10-23 PROCEDURE — 93298 REM INTERROG DEV EVAL SCRMS: CPT

## 2020-10-23 PROCEDURE — G2066: CPT

## 2020-11-11 ENCOUNTER — RX CHANGE (OUTPATIENT)
Age: 70
End: 2020-11-11

## 2020-11-11 RX ORDER — METOPROLOL TARTRATE 25 MG/1
25 TABLET, FILM COATED ORAL 3 TIMES DAILY
Qty: 135 | Refills: 3 | Status: DISCONTINUED | COMMUNITY
Start: 2020-09-03 | End: 2020-11-11

## 2020-11-17 ENCOUNTER — NON-APPOINTMENT (OUTPATIENT)
Age: 70
End: 2020-11-17

## 2020-11-23 ENCOUNTER — APPOINTMENT (OUTPATIENT)
Dept: ELECTROPHYSIOLOGY | Facility: CLINIC | Age: 70
End: 2020-11-23
Payer: MEDICARE

## 2020-11-23 PROCEDURE — 93298 REM INTERROG DEV EVAL SCRMS: CPT

## 2020-11-23 PROCEDURE — G2066: CPT

## 2020-12-03 ENCOUNTER — NON-APPOINTMENT (OUTPATIENT)
Age: 70
End: 2020-12-03

## 2020-12-04 ENCOUNTER — APPOINTMENT (OUTPATIENT)
Dept: ELECTROPHYSIOLOGY | Facility: CLINIC | Age: 70
End: 2020-12-04
Payer: MEDICARE

## 2020-12-04 VITALS — DIASTOLIC BLOOD PRESSURE: 73 MMHG | SYSTOLIC BLOOD PRESSURE: 115 MMHG

## 2020-12-04 PROCEDURE — 93285 PRGRMG DEV EVAL SCRMS IP: CPT

## 2020-12-18 ENCOUNTER — RX RENEWAL (OUTPATIENT)
Age: 70
End: 2020-12-18

## 2020-12-21 ENCOUNTER — RX RENEWAL (OUTPATIENT)
Age: 70
End: 2020-12-21

## 2020-12-23 ENCOUNTER — APPOINTMENT (OUTPATIENT)
Dept: UROLOGY | Facility: CLINIC | Age: 70
End: 2020-12-23
Payer: MEDICARE

## 2020-12-23 DIAGNOSIS — N13.8 BENIGN PROSTATIC HYPERPLASIA WITH LOWER URINARY TRACT SYMPMS: ICD-10-CM

## 2020-12-23 DIAGNOSIS — N40.1 BENIGN PROSTATIC HYPERPLASIA WITH LOWER URINARY TRACT SYMPMS: ICD-10-CM

## 2020-12-23 PROBLEM — Z87.440 HISTORY OF URINARY TRACT INFECTION: Status: RESOLVED | Noted: 2020-05-14 | Resolved: 2020-12-23

## 2020-12-23 PROCEDURE — 99212 OFFICE O/P EST SF 10 MIN: CPT | Mod: 95

## 2020-12-24 ENCOUNTER — RX RENEWAL (OUTPATIENT)
Age: 70
End: 2020-12-24

## 2020-12-24 PROBLEM — N40.1 BPH WITH OBSTRUCTION/LOWER URINARY TRACT SYMPTOMS: Status: ACTIVE | Noted: 2019-12-06

## 2020-12-24 NOTE — HISTORY OF PRESENT ILLNESS
[Home] : at home, [unfilled] , at the time of the visit. 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 Nocturia once. FOS ok with no hesitancy, intermittency or straining. No dysuria, hematuria or UTIs. no retention.\par Has ED following his aneurysm surgery - not interested in medications.\par PSA 3.1 . \par \par DUE to COVID opted fopr TEB. No change in LIUTs - nothing new. no dyusria or hematuria.

## 2020-12-24 NOTE — ASSESSMENT
[FreeTextEntry1] : no change - \par did note at some point no need for routine PSAs.\par see me 1 year

## 2021-01-09 ENCOUNTER — RX RENEWAL (OUTPATIENT)
Age: 71
End: 2021-01-09

## 2021-01-11 ENCOUNTER — APPOINTMENT (OUTPATIENT)
Dept: ELECTROPHYSIOLOGY | Facility: CLINIC | Age: 71
End: 2021-01-11
Payer: MEDICARE

## 2021-01-11 PROCEDURE — 93298 REM INTERROG DEV EVAL SCRMS: CPT

## 2021-01-11 PROCEDURE — G2066: CPT

## 2021-02-02 ENCOUNTER — NON-APPOINTMENT (OUTPATIENT)
Age: 71
End: 2021-02-02

## 2021-02-10 ENCOUNTER — APPOINTMENT (OUTPATIENT)
Dept: CARDIOLOGY | Facility: CLINIC | Age: 71
End: 2021-02-10
Payer: MEDICARE

## 2021-02-10 DIAGNOSIS — R09.89 OTHER SPECIFIED SYMPTOMS AND SIGNS INVOLVING THE CIRCULATORY AND RESPIRATORY SYSTEMS: ICD-10-CM

## 2021-02-10 PROCEDURE — 99214 OFFICE O/P EST MOD 30 MIN: CPT | Mod: 95

## 2021-02-10 RX ORDER — HYDRALAZINE HYDROCHLORIDE 25 MG/1
25 TABLET ORAL
Qty: 270 | Refills: 3 | Status: DISCONTINUED | COMMUNITY
Start: 2020-09-11 | End: 2021-02-10

## 2021-02-11 ENCOUNTER — APPOINTMENT (OUTPATIENT)
Dept: ELECTROPHYSIOLOGY | Facility: CLINIC | Age: 71
End: 2021-02-11
Payer: MEDICARE

## 2021-02-11 ENCOUNTER — NON-APPOINTMENT (OUTPATIENT)
Age: 71
End: 2021-02-11

## 2021-02-11 PROCEDURE — G2066: CPT

## 2021-02-11 PROCEDURE — 93298 REM INTERROG DEV EVAL SCRMS: CPT

## 2021-03-03 ENCOUNTER — OUTPATIENT (OUTPATIENT)
Dept: OUTPATIENT SERVICES | Facility: HOSPITAL | Age: 71
LOS: 1 days | Discharge: HOME | End: 2021-03-03

## 2021-03-03 DIAGNOSIS — D75.1 SECONDARY POLYCYTHEMIA: ICD-10-CM

## 2021-03-03 DIAGNOSIS — D03.9 MELANOMA IN SITU, UNSPECIFIED: Chronic | ICD-10-CM

## 2021-03-03 DIAGNOSIS — Z98.890 OTHER SPECIFIED POSTPROCEDURAL STATES: Chronic | ICD-10-CM

## 2021-03-08 ENCOUNTER — APPOINTMENT (OUTPATIENT)
Dept: HEMATOLOGY ONCOLOGY | Facility: CLINIC | Age: 71
End: 2021-03-08
Payer: MEDICARE

## 2021-03-08 PROCEDURE — 99213 OFFICE O/P EST LOW 20 MIN: CPT | Mod: 95

## 2021-03-08 NOTE — PHYSICAL EXAM
[Restricted in physically strenuous activity but ambulatory and able to carry out work of a light or sedentary nature] : Status 1- Restricted in physically strenuous activity but ambulatory and able to carry out work of a light or sedentary nature, e.g., light house work, office work [de-identified] : Cannot perform physical exam due to nature of telemedicine visit, however on telemedicine patient appears to not be in any acute distress

## 2021-03-08 NOTE — ASSESSMENT
[FreeTextEntry1] : 68 y/o M with history of aortic dissection in 2016, longstanding history of HTN, and peripheral neuropathy in feet ever since his aortic dissection surgery here for follow up of polycythemia. \par \par -Hemoglobin/hematocrit completely normalized at this point, and workup was unremarkable. \par -No symptoms of primary MPN. Can follow up H/H annually at this point. \par -Continue cards follow up. \par -Elevated free light chains - unclear etiology, no evidence of monoclonal gammopathy but ratio uptrending. Will recheck. \par -Follow up annually depending on light chain measurement. If still uptrending may make shorter interval follow up in the office. \par -Patient understands and agrees with plan. All information explained to the best of my ability.\par \par The visit was completed via tele-medicine visit due to the restrictions of the COVID-19 pandemic. All issues as above were discussed and addressed but no physical exam was performed. If it was felt that the patient should be evaluated in the clinic then they were directed there. The patient verbally consented to visit.\par

## 2021-03-08 NOTE — HISTORY OF PRESENT ILLNESS
[Home] : at home, [unfilled] , at the time of the visit. [Medical Office: ([facility])___] : at the medical office located in  [Verbal consent obtained from patient] : the patient, [unfilled] [de-identified] : 70 y/o M with longstanding history of hypertension, with aortic dissection in 2016 with prolonged hospital course, was very critically ill at the time, now with persistent fatigue and exercise intolerance referred to me for polycythemia on lab work. Patient mentions that he was initially under the impression that it was because he was dehydrated, but his doctor became concerned when his electrolytes didn't support that. He reports he remembers being told in the past it was slightly elevated but fluctuated to normal. No flushing or pruritus after warm shower. Denies any chest pains or dyspnea at rest, only has dyspnea on exertion ever since his incident with the aortic dissection. No thrombosis in the past. No family history of hematologic disorders. Patient is a non-smoker, wife reports he does not snore and he has no trouble sleeping, no frequent night-time awakening. \par \par Patient erythropoietin level at 13.1, Oxygen p50 normal. Hemoglobin normalized without intervention. Patient was also found with elevated kappa free light chain with an elevated ratio at very low level, no other signs of monoclonal gammopathy. This ratio uptrending slightly, so following q6months at this point.  [de-identified] : Patient reports feeling very well lately. No complaints at this time. He recently got his first COVID vaccine with the Moderna vaccine, and is planned for his 2nd injection later this week.

## 2021-03-17 ENCOUNTER — APPOINTMENT (OUTPATIENT)
Dept: ELECTROPHYSIOLOGY | Facility: CLINIC | Age: 71
End: 2021-03-17
Payer: MEDICARE

## 2021-03-17 ENCOUNTER — NON-APPOINTMENT (OUTPATIENT)
Age: 71
End: 2021-03-17

## 2021-03-17 PROCEDURE — 93298 REM INTERROG DEV EVAL SCRMS: CPT

## 2021-03-17 PROCEDURE — G2066: CPT

## 2021-03-26 ENCOUNTER — RX RENEWAL (OUTPATIENT)
Age: 71
End: 2021-03-26

## 2021-04-08 LAB
ALBUMIN MFR SERPL ELPH: 66 %
ALBUMIN SERPL ELPH-MCNC: 4.3 G/DL
ALBUMIN SERPL-MCNC: 4 G/DL
ALBUMIN/GLOB SERPL: 2 RATIO
ALP BLD-CCNC: 90 U/L
ALPHA1 GLOB MFR SERPL ELPH: 4.9 %
ALPHA1 GLOB SERPL ELPH-MCNC: 0.3 G/DL
ALPHA2 GLOB MFR SERPL ELPH: 12 %
ALPHA2 GLOB SERPL ELPH-MCNC: 0.7 G/DL
ALT SERPL-CCNC: 21 U/L
ANION GAP SERPL CALC-SCNC: 15 MMOL/L
AST SERPL-CCNC: 25 U/L
B-GLOBULIN MFR SERPL ELPH: 9.4 %
B-GLOBULIN SERPL ELPH-MCNC: 0.6 G/DL
BASOPHILS # BLD AUTO: 0.07 K/UL
BASOPHILS NFR BLD AUTO: 0.8 %
BILIRUB SERPL-MCNC: 0.6 MG/DL
BUN SERPL-MCNC: 35 MG/DL
CALCIUM SERPL-MCNC: 10 MG/DL
CHLORIDE SERPL-SCNC: 100 MMOL/L
CO2 SERPL-SCNC: 23 MMOL/L
CREAT SERPL-MCNC: 1.74 MG/DL
DEPRECATED KAPPA LC FREE/LAMBDA SER: 3.22 RATIO
EOSINOPHIL # BLD AUTO: 0.21 K/UL
EOSINOPHIL NFR BLD AUTO: 2.5 %
ESTIMATED AVERAGE GLUCOSE: 94 MG/DL
GAMMA GLOB FLD ELPH-MCNC: 0.5 G/DL
GAMMA GLOB MFR SERPL ELPH: 7.7 %
GLUCOSE SERPL-MCNC: 109 MG/DL
HBA1C MFR BLD HPLC: 4.9 %
HCT VFR BLD CALC: 47.2 %
HGB BLD-MCNC: 15.6 G/DL
IGA SER QL IEP: 44 MG/DL
IGG SER QL IEP: 504 MG/DL
IGM SER QL IEP: 21 MG/DL
IMM GRANULOCYTES NFR BLD AUTO: 0.2 %
INTERPRETATION SERPL IEP-IMP: NORMAL
KAPPA LC CSF-MCNC: 0.91 MG/DL
KAPPA LC SERPL-MCNC: 2.93 MG/DL
LYMPHOCYTES # BLD AUTO: 3.33 K/UL
LYMPHOCYTES NFR BLD AUTO: 39.1 %
M PROTEIN SPEC IFE-MCNC: NORMAL
MAN DIFF?: NORMAL
MCHC RBC-ENTMCNC: 30.1 PG
MCHC RBC-ENTMCNC: 33.1 GM/DL
MCV RBC AUTO: 90.9 FL
MONOCYTES # BLD AUTO: 0.73 K/UL
MONOCYTES NFR BLD AUTO: 8.6 %
NEUTROPHILS # BLD AUTO: 4.16 K/UL
NEUTROPHILS NFR BLD AUTO: 48.8 %
PLATELET # BLD AUTO: 216 K/UL
POTASSIUM SERPL-SCNC: 4.1 MMOL/L
PROT SERPL-MCNC: 6 G/DL
RBC # BLD: 5.19 M/UL
RBC # FLD: 13 %
SODIUM SERPL-SCNC: 138 MMOL/L
WBC # FLD AUTO: 8.52 K/UL

## 2021-04-21 ENCOUNTER — APPOINTMENT (OUTPATIENT)
Dept: ELECTROPHYSIOLOGY | Facility: CLINIC | Age: 71
End: 2021-04-21
Payer: MEDICARE

## 2021-04-21 ENCOUNTER — NON-APPOINTMENT (OUTPATIENT)
Age: 71
End: 2021-04-21

## 2021-04-21 PROCEDURE — G2066: CPT

## 2021-04-21 PROCEDURE — 93298 REM INTERROG DEV EVAL SCRMS: CPT

## 2021-04-22 ENCOUNTER — APPOINTMENT (OUTPATIENT)
Dept: ELECTROPHYSIOLOGY | Facility: CLINIC | Age: 71
End: 2021-04-22

## 2021-04-23 ENCOUNTER — OUTPATIENT (OUTPATIENT)
Dept: OUTPATIENT SERVICES | Facility: HOSPITAL | Age: 71
LOS: 1 days | End: 2021-04-23
Payer: MEDICARE

## 2021-04-23 ENCOUNTER — APPOINTMENT (OUTPATIENT)
Dept: RADIOLOGY | Facility: CLINIC | Age: 71
End: 2021-04-23
Payer: MEDICARE

## 2021-04-23 ENCOUNTER — NON-APPOINTMENT (OUTPATIENT)
Age: 71
End: 2021-04-23

## 2021-04-23 ENCOUNTER — APPOINTMENT (OUTPATIENT)
Dept: INTERNAL MEDICINE | Facility: CLINIC | Age: 71
End: 2021-04-23
Payer: MEDICARE

## 2021-04-23 VITALS
HEIGHT: 70 IN | SYSTOLIC BLOOD PRESSURE: 120 MMHG | WEIGHT: 164 LBS | TEMPERATURE: 96.3 F | BODY MASS INDEX: 23.48 KG/M2 | DIASTOLIC BLOOD PRESSURE: 80 MMHG

## 2021-04-23 DIAGNOSIS — N18.9 CHRONIC KIDNEY DISEASE, UNSPECIFIED: ICD-10-CM

## 2021-04-23 DIAGNOSIS — Z00.00 ENCOUNTER FOR GENERAL ADULT MEDICAL EXAMINATION W/OUT ABNORMAL FINDINGS: ICD-10-CM

## 2021-04-23 DIAGNOSIS — M65.322 TRIGGER FINGER, LEFT INDEX FINGER: ICD-10-CM

## 2021-04-23 DIAGNOSIS — Z98.890 OTHER SPECIFIED POSTPROCEDURAL STATES: Chronic | ICD-10-CM

## 2021-04-23 DIAGNOSIS — Z86.79 PERSONAL HISTORY OF OTHER DISEASES OF THE CIRCULATORY SYSTEM: ICD-10-CM

## 2021-04-23 DIAGNOSIS — M25.569 PAIN IN UNSPECIFIED KNEE: ICD-10-CM

## 2021-04-23 DIAGNOSIS — M65.352 TRIGGER FINGER, LEFT LITTLE FINGER: ICD-10-CM

## 2021-04-23 DIAGNOSIS — M79.18 MYALGIA, OTHER SITE: ICD-10-CM

## 2021-04-23 DIAGNOSIS — D03.9 MELANOMA IN SITU, UNSPECIFIED: Chronic | ICD-10-CM

## 2021-04-23 DIAGNOSIS — R06.02 SHORTNESS OF BREATH: ICD-10-CM

## 2021-04-23 DIAGNOSIS — Z01.818 ENCOUNTER FOR OTHER PREPROCEDURAL EXAMINATION: ICD-10-CM

## 2021-04-23 LAB
BASOPHILS # BLD AUTO: 0.07 K/UL
BASOPHILS NFR BLD AUTO: 0.8 %
EOSINOPHIL # BLD AUTO: 0.39 K/UL
EOSINOPHIL NFR BLD AUTO: 4.6 %
HCT VFR BLD CALC: 48.7 %
HGB BLD-MCNC: 16.5 G/DL
IMM GRANULOCYTES NFR BLD AUTO: 0.2 %
LYMPHOCYTES # BLD AUTO: 3.05 K/UL
LYMPHOCYTES NFR BLD AUTO: 35.9 %
MAN DIFF?: NORMAL
MCHC RBC-ENTMCNC: 29.7 PG
MCHC RBC-ENTMCNC: 33.9 GM/DL
MCV RBC AUTO: 87.6 FL
MONOCYTES # BLD AUTO: 0.74 K/UL
MONOCYTES NFR BLD AUTO: 8.7 %
NEUTROPHILS # BLD AUTO: 4.23 K/UL
NEUTROPHILS NFR BLD AUTO: 49.8 %
PLATELET # BLD AUTO: 226 K/UL
RBC # BLD: 5.56 M/UL
RBC # FLD: 12.9 %
WBC # FLD AUTO: 8.5 K/UL

## 2021-04-23 PROCEDURE — 36415 COLL VENOUS BLD VENIPUNCTURE: CPT

## 2021-04-23 PROCEDURE — G0439: CPT

## 2021-04-23 PROCEDURE — 71046 X-RAY EXAM CHEST 2 VIEWS: CPT

## 2021-04-23 PROCEDURE — 71046 X-RAY EXAM CHEST 2 VIEWS: CPT | Mod: 26

## 2021-04-23 PROCEDURE — 93000 ELECTROCARDIOGRAM COMPLETE: CPT | Mod: 59

## 2021-04-23 PROCEDURE — 99213 OFFICE O/P EST LOW 20 MIN: CPT | Mod: 25

## 2021-04-23 RX ORDER — AMLODIPINE BESYLATE 10 MG/1
10 TABLET ORAL DAILY
Qty: 90 | Refills: 2 | Status: DISCONTINUED | COMMUNITY
Start: 2020-10-16 | End: 2021-04-23

## 2021-04-26 ENCOUNTER — NON-APPOINTMENT (OUTPATIENT)
Age: 71
End: 2021-04-26

## 2021-04-26 LAB
25(OH)D3 SERPL-MCNC: 64.4 NG/ML
ALBUMIN SERPL ELPH-MCNC: 4.7 G/DL
ALP BLD-CCNC: 103 U/L
ALT SERPL-CCNC: 24 U/L
ANION GAP SERPL CALC-SCNC: 11 MMOL/L
APPEARANCE: CLEAR
AST SERPL-CCNC: 28 U/L
BACTERIA: NEGATIVE
BILIRUB SERPL-MCNC: 0.6 MG/DL
BILIRUBIN URINE: NEGATIVE
BLOOD URINE: NEGATIVE
BUN SERPL-MCNC: 28 MG/DL
CALCIUM SERPL-MCNC: 11 MG/DL
CHLORIDE SERPL-SCNC: 98 MMOL/L
CHOLEST SERPL-MCNC: 151 MG/DL
CK SERPL-CCNC: 64 U/L
CO2 SERPL-SCNC: 27 MMOL/L
COLOR: NORMAL
CREAT SERPL-MCNC: 1.42 MG/DL
CREAT SPEC-SCNC: 43 MG/DL
ESTIMATED AVERAGE GLUCOSE: 97 MG/DL
GLUCOSE QUALITATIVE U: NEGATIVE
GLUCOSE SERPL-MCNC: 99 MG/DL
HBA1C MFR BLD HPLC: 5 %
HDLC SERPL-MCNC: 39 MG/DL
HYALINE CASTS: 0 /LPF
KETONES URINE: NEGATIVE
LDLC SERPL CALC-MCNC: 75 MG/DL
LEUKOCYTE ESTERASE URINE: NEGATIVE
MAGNESIUM SERPL-MCNC: 2.4 MG/DL
MICROALBUMIN 24H UR DL<=1MG/L-MCNC: 4.2 MG/DL
MICROALBUMIN/CREAT 24H UR-RTO: 98 MG/G
MICROSCOPIC-UA: NORMAL
NITRITE URINE: NEGATIVE
NONHDLC SERPL-MCNC: 112 MG/DL
PH URINE: 7
POTASSIUM SERPL-SCNC: 4.5 MMOL/L
PROT SERPL-MCNC: 6.7 G/DL
PROTEIN URINE: NEGATIVE
RED BLOOD CELLS URINE: 0 /HPF
SODIUM SERPL-SCNC: 136 MMOL/L
SPECIFIC GRAVITY URINE: 1.01
SQUAMOUS EPITHELIAL CELLS: 0 /HPF
T3 SERPL-MCNC: 91 NG/DL
T4 FREE SERPL-MCNC: 1.1 NG/DL
TRIGL SERPL-MCNC: 181 MG/DL
TSH SERPL-ACNC: 2.88 UIU/ML
UROBILINOGEN URINE: NORMAL
WHITE BLOOD CELLS URINE: 0 /HPF

## 2021-04-27 LAB — BACTERIA UR CULT: NORMAL

## 2021-04-29 ENCOUNTER — TRANSCRIPTION ENCOUNTER (OUTPATIENT)
Age: 71
End: 2021-04-29

## 2021-05-12 LAB
ANION GAP SERPL CALC-SCNC: 12 MMOL/L
BUN SERPL-MCNC: 23 MG/DL
CALCIUM SERPL-MCNC: 10.9 MG/DL
CALCIUM SERPL-MCNC: 10.9 MG/DL
CHLORIDE SERPL-SCNC: 97 MMOL/L
CO2 SERPL-SCNC: 26 MMOL/L
CREAT SERPL-MCNC: 1.38 MG/DL
GLUCOSE SERPL-MCNC: 100 MG/DL
PARATHYROID HORMONE INTACT: 104 PG/ML
POTASSIUM SERPL-SCNC: 4.7 MMOL/L
SODIUM SERPL-SCNC: 135 MMOL/L

## 2021-05-26 ENCOUNTER — APPOINTMENT (OUTPATIENT)
Dept: ELECTROPHYSIOLOGY | Facility: CLINIC | Age: 71
End: 2021-05-26
Payer: MEDICARE

## 2021-05-26 ENCOUNTER — NON-APPOINTMENT (OUTPATIENT)
Age: 71
End: 2021-05-26

## 2021-05-26 PROCEDURE — 93298 REM INTERROG DEV EVAL SCRMS: CPT

## 2021-05-26 PROCEDURE — G2066: CPT

## 2021-06-07 ENCOUNTER — RX RENEWAL (OUTPATIENT)
Age: 71
End: 2021-06-07

## 2021-06-28 ENCOUNTER — NON-APPOINTMENT (OUTPATIENT)
Age: 71
End: 2021-06-28

## 2021-06-30 ENCOUNTER — NON-APPOINTMENT (OUTPATIENT)
Age: 71
End: 2021-06-30

## 2021-06-30 ENCOUNTER — APPOINTMENT (OUTPATIENT)
Dept: ELECTROPHYSIOLOGY | Facility: CLINIC | Age: 71
End: 2021-06-30
Payer: MEDICARE

## 2021-06-30 PROCEDURE — 93298 REM INTERROG DEV EVAL SCRMS: CPT

## 2021-06-30 PROCEDURE — G2066: CPT

## 2021-07-22 ENCOUNTER — RX RENEWAL (OUTPATIENT)
Age: 71
End: 2021-07-22

## 2021-07-27 ENCOUNTER — APPOINTMENT (OUTPATIENT)
Dept: MRI IMAGING | Facility: CLINIC | Age: 71
End: 2021-07-27
Payer: MEDICARE

## 2021-07-27 ENCOUNTER — OUTPATIENT (OUTPATIENT)
Dept: OUTPATIENT SERVICES | Facility: HOSPITAL | Age: 71
LOS: 1 days | End: 2021-07-27
Payer: MEDICARE

## 2021-07-27 DIAGNOSIS — Z98.890 OTHER SPECIFIED POSTPROCEDURAL STATES: ICD-10-CM

## 2021-07-27 DIAGNOSIS — Z98.890 OTHER SPECIFIED POSTPROCEDURAL STATES: Chronic | ICD-10-CM

## 2021-07-27 DIAGNOSIS — D03.9 MELANOMA IN SITU, UNSPECIFIED: Chronic | ICD-10-CM

## 2021-07-27 PROCEDURE — A9585: CPT

## 2021-07-27 PROCEDURE — 74185 MRA ABD W OR W/O CNTRST: CPT | Mod: 26,MH

## 2021-07-27 PROCEDURE — C8902: CPT

## 2021-07-29 ENCOUNTER — APPOINTMENT (OUTPATIENT)
Dept: MRI IMAGING | Facility: CLINIC | Age: 71
End: 2021-07-29
Payer: MEDICARE

## 2021-07-29 ENCOUNTER — OUTPATIENT (OUTPATIENT)
Dept: OUTPATIENT SERVICES | Facility: HOSPITAL | Age: 71
LOS: 1 days | End: 2021-07-29
Payer: MEDICARE

## 2021-07-29 DIAGNOSIS — Z98.890 OTHER SPECIFIED POSTPROCEDURAL STATES: Chronic | ICD-10-CM

## 2021-07-29 DIAGNOSIS — Z98.890 OTHER SPECIFIED POSTPROCEDURAL STATES: ICD-10-CM

## 2021-07-29 DIAGNOSIS — D03.9 MELANOMA IN SITU, UNSPECIFIED: Chronic | ICD-10-CM

## 2021-07-29 PROCEDURE — 71555 MRI ANGIO CHEST W OR W/O DYE: CPT | Mod: 26,MH

## 2021-07-29 PROCEDURE — C8909: CPT

## 2021-07-29 PROCEDURE — A9585: CPT

## 2021-08-04 ENCOUNTER — APPOINTMENT (OUTPATIENT)
Dept: ELECTROPHYSIOLOGY | Facility: CLINIC | Age: 71
End: 2021-08-04
Payer: MEDICARE

## 2021-08-04 ENCOUNTER — NON-APPOINTMENT (OUTPATIENT)
Age: 71
End: 2021-08-04

## 2021-08-04 PROCEDURE — G2066: CPT

## 2021-08-04 PROCEDURE — 93298 REM INTERROG DEV EVAL SCRMS: CPT

## 2021-08-06 ENCOUNTER — APPOINTMENT (OUTPATIENT)
Dept: UROLOGY | Facility: CLINIC | Age: 71
End: 2021-08-06
Payer: MEDICARE

## 2021-08-06 PROCEDURE — 99214 OFFICE O/P EST MOD 30 MIN: CPT

## 2021-08-09 ENCOUNTER — TRANSCRIPTION ENCOUNTER (OUTPATIENT)
Age: 71
End: 2021-08-09

## 2021-08-09 NOTE — ASSESSMENT
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 he has h/o BCC and melanoma removal in past. Neither LN would be amenable to percutaneous biopsy so plan on removing the larger one at time of laparoscopic partial \par reviewed how performed hospitalization and recovery in addition to complications.

## 2021-08-09 NOTE — HISTORY OF PRESENT ILLNESS
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 Nocturia once. FOS ok with no hesitancy, intermittency or straining. No dysuria, hematuria or UTIs. no retention.\par Has ED following his aneurysm surgery - not interested in medications.\par PSA 3.1 . \par \par DUE to COVID opted fopr TEB. No change in LIUTs - nothing new. no dysuria or hematuria. \par \par here today with finding of a left renal mass noted of recent CT scan performed as f/u for AAA\par reviewed this scan and one from last year with patient and wife: notes a 1.6cm mixed cystic solid lesion anterior surface of right kidney; noted lesion present last year at 1cm and more systic then. Tony note 2 enlarged LNs one 2cm in the left hilum

## 2021-08-11 ENCOUNTER — OUTPATIENT (OUTPATIENT)
Dept: OUTPATIENT SERVICES | Facility: HOSPITAL | Age: 71
LOS: 1 days | End: 2021-08-11
Payer: MEDICARE

## 2021-08-11 VITALS
HEART RATE: 63 BPM | DIASTOLIC BLOOD PRESSURE: 78 MMHG | TEMPERATURE: 98 F | RESPIRATION RATE: 14 BRPM | HEIGHT: 67.5 IN | SYSTOLIC BLOOD PRESSURE: 120 MMHG | WEIGHT: 166.01 LBS | OXYGEN SATURATION: 99 %

## 2021-08-11 DIAGNOSIS — Z98.890 OTHER SPECIFIED POSTPROCEDURAL STATES: Chronic | ICD-10-CM

## 2021-08-11 DIAGNOSIS — N28.89 OTHER SPECIFIED DISORDERS OF KIDNEY AND URETER: ICD-10-CM

## 2021-08-11 DIAGNOSIS — D03.9 MELANOMA IN SITU, UNSPECIFIED: Chronic | ICD-10-CM

## 2021-08-11 DIAGNOSIS — Z90.49 ACQUIRED ABSENCE OF OTHER SPECIFIED PARTS OF DIGESTIVE TRACT: Chronic | ICD-10-CM

## 2021-08-11 LAB
ALBUMIN SERPL ELPH-MCNC: 4.6 G/DL — SIGNIFICANT CHANGE UP (ref 3.3–5)
ALP SERPL-CCNC: 93 U/L — SIGNIFICANT CHANGE UP (ref 40–120)
ALT FLD-CCNC: 24 U/L — SIGNIFICANT CHANGE UP (ref 4–41)
ANION GAP SERPL CALC-SCNC: 13 MMOL/L — SIGNIFICANT CHANGE UP (ref 7–14)
AST SERPL-CCNC: 23 U/L — SIGNIFICANT CHANGE UP (ref 4–40)
BILIRUB SERPL-MCNC: 0.9 MG/DL — SIGNIFICANT CHANGE UP (ref 0.2–1.2)
BLD GP AB SCN SERPL QL: NEGATIVE — SIGNIFICANT CHANGE UP
BUN SERPL-MCNC: 33 MG/DL — HIGH (ref 7–23)
CALCIUM SERPL-MCNC: 10.7 MG/DL — HIGH (ref 8.4–10.5)
CHLORIDE SERPL-SCNC: 101 MMOL/L — SIGNIFICANT CHANGE UP (ref 98–107)
CO2 SERPL-SCNC: 25 MMOL/L — SIGNIFICANT CHANGE UP (ref 22–31)
CREAT SERPL-MCNC: 1.38 MG/DL — HIGH (ref 0.5–1.3)
GLUCOSE SERPL-MCNC: 87 MG/DL — SIGNIFICANT CHANGE UP (ref 70–99)
HCT VFR BLD CALC: 47.7 % — SIGNIFICANT CHANGE UP (ref 39–50)
HGB BLD-MCNC: 16 G/DL — SIGNIFICANT CHANGE UP (ref 13–17)
MCHC RBC-ENTMCNC: 29.3 PG — SIGNIFICANT CHANGE UP (ref 27–34)
MCHC RBC-ENTMCNC: 33.5 GM/DL — SIGNIFICANT CHANGE UP (ref 32–36)
MCV RBC AUTO: 87.4 FL — SIGNIFICANT CHANGE UP (ref 80–100)
NRBC # BLD: 0 /100 WBCS — SIGNIFICANT CHANGE UP
NRBC # FLD: 0 K/UL — SIGNIFICANT CHANGE UP
PLATELET # BLD AUTO: 207 K/UL — SIGNIFICANT CHANGE UP (ref 150–400)
POTASSIUM SERPL-MCNC: 4.7 MMOL/L — SIGNIFICANT CHANGE UP (ref 3.5–5.3)
POTASSIUM SERPL-SCNC: 4.7 MMOL/L — SIGNIFICANT CHANGE UP (ref 3.5–5.3)
PROT SERPL-MCNC: 6.9 G/DL — SIGNIFICANT CHANGE UP (ref 6–8.3)
RBC # BLD: 5.46 M/UL — SIGNIFICANT CHANGE UP (ref 4.2–5.8)
RBC # FLD: 13.3 % — SIGNIFICANT CHANGE UP (ref 10.3–14.5)
RH IG SCN BLD-IMP: POSITIVE — SIGNIFICANT CHANGE UP
SODIUM SERPL-SCNC: 139 MMOL/L — SIGNIFICANT CHANGE UP (ref 135–145)
WBC # BLD: 7.89 K/UL — SIGNIFICANT CHANGE UP (ref 3.8–10.5)
WBC # FLD AUTO: 7.89 K/UL — SIGNIFICANT CHANGE UP (ref 3.8–10.5)

## 2021-08-11 PROCEDURE — 93010 ELECTROCARDIOGRAM REPORT: CPT

## 2021-08-11 RX ORDER — HYDRALAZINE HCL 50 MG
1 TABLET ORAL
Qty: 0 | Refills: 0 | DISCHARGE

## 2021-08-11 RX ORDER — GABAPENTIN 400 MG/1
2 CAPSULE ORAL
Qty: 0 | Refills: 0 | DISCHARGE

## 2021-08-11 RX ORDER — DESLORATADINE 5 MG/1
1 TABLET, FILM COATED ORAL
Qty: 0 | Refills: 0 | DISCHARGE

## 2021-08-11 RX ORDER — SERTRALINE 25 MG/1
1 TABLET, FILM COATED ORAL
Qty: 0 | Refills: 0 | DISCHARGE

## 2021-08-11 NOTE — H&P PST ADULT - NEGATIVE GENERAL GENITOURINARY SYMPTOMS
no hematuria/no renal colic/no flank pain L/no flank pain R/no urine discoloration/no bladder infections/no dysuria/no urinary hesitancy/normal urinary frequency/no nocturia no hematuria/no renal colic/no flank pain L/no flank pain R/no bladder infections/no dysuria/no urinary hesitancy/normal urinary frequency

## 2021-08-11 NOTE — H&P PST ADULT - NEGATIVE ENMT SYMPTOMS
no hearing difficulty/no ear pain/no tinnitus/no vertigo/no sinus symptoms/no nasal congestion/no nasal discharge/no nasal obstruction/no post-nasal discharge/no nose bleeds/no recurrent cold sores

## 2021-08-11 NOTE — H&P PST ADULT - VISION (WITH CORRECTIVE LENSES IF THE PATIENT USUALLY WEARS THEM):
Normal vision: sees adequately in most situations; can see medication labels, newsprint distance glasses/Normal vision: sees adequately in most situations; can see medication labels, newsprint

## 2021-08-11 NOTE — H&P PST ADULT - NEGATIVE GASTROINTESTINAL SYMPTOMS
no nausea/no vomiting/no diarrhea/no change in bowel habits/no hiccoughs no nausea/no vomiting/no diarrhea/no constipation/no abdominal pain

## 2021-08-11 NOTE — H&P PST ADULT - NEGATIVE GENERAL SYMPTOMS
no fever/no chills/no sweating/no anorexia/no weight loss/no weight gain/no polyuria/no malaise no fever/no chills/no sweating/no anorexia/no weight gain/no polyphagia/no polyuria/no polydipsia/no malaise/no fatigue

## 2021-08-11 NOTE — H&P PST ADULT - NEGATIVE OPHTHALMOLOGIC SYMPTOMS
no diplopia/no photophobia/no lacrimation L/no lacrimation R/no blurred vision L/no discharge L/no discharge R/no pain L/no pain R/no irritation L/no irritation R/no loss of vision L/no loss of vision R/no scleral injection L

## 2021-08-11 NOTE — H&P PST ADULT - HISTORY OF PRESENT ILLNESS
69y/o male scheduled for left laparoscopic partial nephrectomy and removal of lymph node on 8/30/2021.  Pt states, "hx AAA repair 2013 went for f/u MRA results negative, identified cyst on left kidney.  When compared to previous MRA shows increase in size of cyst." 71y/o male scheduled for left laparoscopic partial nephrectomy and removal of lymph node on 8/30/2021.  Pt states, "hx aortic aneurysm repair 2013 went for f/u MRA results negative, identified cyst on left kidney.  When compared to previous MRA shows increase in size of cyst."

## 2021-08-11 NOTE — H&P PST ADULT - NSICDXPASTMEDICALHX_GEN_ALL_CORE_FT
PAST MEDICAL HISTORY:  Anxiety     Aortic dissection, thoracic     Central vein occlusion of retina     GERD (gastroesophageal reflux disease)     Hypertension     Melanoma in situ right arm; 2014     PAST MEDICAL HISTORY:  Anxiety     Aortic dissection, thoracic     Central retinal vein thrombosis right    Central vein occlusion of retina 2015    GERD (gastroesophageal reflux disease)     Hypertension     Melanoma in situ right arm; 2014    Neuroma right groin s/p aortic aneurysm repair 2013    Other specified disorders of kidney and ureter     Polycythemia

## 2021-08-11 NOTE — H&P PST ADULT - MARITAL STATUS
1 daughter, 3 sons - A&W/ 1 daughter, 3 sons, mother  70's CAD, father  70's cad, dm, 1 half sister  70's unknown hx/

## 2021-08-11 NOTE — H&P PST ADULT - NSICDXPASTSURGICALHX_GEN_ALL_CORE_FT
PAST SURGICAL HISTORY:  History of tonsillectomy and adenoidectomy at age 3    Melanoma in situ Excision of menaloma of right arm; 2014    S/P aortic dissection repair      PAST SURGICAL HISTORY:  History of loop recorder [email or web address] [id number] sn [id number]    History of tonsillectomy and adenoidectomy at age 3    Melanoma in situ Excision of menaloma of right arm; 2014    S/P aortic dissection repair     S/P cholecystectomy 2017

## 2021-08-11 NOTE — H&P PST ADULT - CARDIOVASCULAR COMMENTS
hx of palpitations 8/2020 loop recorder placed, denies any further episodes of palpitations left chest loop recorder

## 2021-08-11 NOTE — H&P PST ADULT - ANESTHESIA, PREVIOUS REACTION, PROFILE
none "s/p colonoscopy developed hypotension, within the last 2 yrs, never happened with surgeries prior to colonoscopy"  denies family hx/hypotension

## 2021-08-11 NOTE — H&P PST ADULT - PROBLEM SELECTOR PLAN 1
Pt scheduled for left laparoscopic partial nephrectomy and removal of lymph node on 8/30/2021.  labs done results pending, ekg done.  Hibiclens provided-  written and verbal instructions given with teach back, pt able to verbalize understanding.  Preop teaching done, pt able to verbalize understanding.   Pt instructed to obtain preop covid testing.   cardiology aggie-  hx of loop recorder, aortic dissection repair, instructed to obtain cardiology eval, case discussed with Libby LEI for urology   meds day of procedure-  sertraline, gabapentin, hydralazine, metoprolol, amlodipine  anesthesia-  DANNY precautions.   ASR worksheet request form  Dr. Brett Gayle cardiology eval [phone]  echo 2018 in chart  stress test 2017 in chart.

## 2021-08-11 NOTE — H&P PST ADULT - ACTIVITY
tread mill 40 min, alt with elliptical 2-3 x week but not since gallbladder attack elliptical daily for 1 hr, able to climb 2 flights of stairs

## 2021-08-11 NOTE — H&P PST ADULT - NEGATIVE NEUROLOGICAL SYMPTOMS
no generalized seizures/no focal seizures/no syncope/no tremors/no vertigo/no loss of sensation/no headache/no loss of consciousness/no confusion/no facial palsy

## 2021-08-11 NOTE — H&P PST ADULT - MUSCULOSKELETAL
No joint pain, swelling or deformity; no limitation of movement details… detailed exam ROM intact/normal strength

## 2021-08-11 NOTE — H&P PST ADULT - OTHER CARE PROVIDERS
Dr. Zion Maradiaga ( Neurologist) [phone] Dr. Arizmendi  pmd [phone] Dr. Kyleigh Resendiz pmd [phone], Dr. Bradley Goldberg hematology

## 2021-08-12 LAB
CULTURE RESULTS: SIGNIFICANT CHANGE UP
SPECIMEN SOURCE: SIGNIFICANT CHANGE UP

## 2021-08-16 ENCOUNTER — OUTPATIENT (OUTPATIENT)
Dept: OUTPATIENT SERVICES | Facility: HOSPITAL | Age: 71
LOS: 1 days | Discharge: HOME | End: 2021-08-16

## 2021-08-16 DIAGNOSIS — Z90.49 ACQUIRED ABSENCE OF OTHER SPECIFIED PARTS OF DIGESTIVE TRACT: Chronic | ICD-10-CM

## 2021-08-16 DIAGNOSIS — Z98.890 OTHER SPECIFIED POSTPROCEDURAL STATES: Chronic | ICD-10-CM

## 2021-08-16 DIAGNOSIS — D03.9 MELANOMA IN SITU, UNSPECIFIED: Chronic | ICD-10-CM

## 2021-08-16 DIAGNOSIS — D75.1 SECONDARY POLYCYTHEMIA: ICD-10-CM

## 2021-08-16 PROBLEM — N28.89 OTHER SPECIFIED DISORDERS OF KIDNEY AND URETER: Chronic | Status: ACTIVE | Noted: 2021-08-11

## 2021-08-16 PROBLEM — H34.8192 CENTRAL RETINAL VEIN OCCLUSION, UNSPECIFIED EYE, STABLE: Chronic | Status: ACTIVE | Noted: 2021-08-11

## 2021-08-16 PROBLEM — D36.10 BENIGN NEOPLASM OF PERIPHERAL NERVES AND AUTONOMIC NERVOUS SYSTEM, UNSPECIFIED: Chronic | Status: ACTIVE | Noted: 2021-08-11

## 2021-08-16 PROBLEM — H34.8192 CENTRAL RETINAL VEIN OCCLUSION, UNSPECIFIED EYE, STABLE: Chronic | Status: ACTIVE | Noted: 2017-04-26

## 2021-08-17 ENCOUNTER — RESULT REVIEW (OUTPATIENT)
Age: 71
End: 2021-08-17

## 2021-08-17 ENCOUNTER — APPOINTMENT (OUTPATIENT)
Dept: HEMATOLOGY ONCOLOGY | Facility: CLINIC | Age: 71
End: 2021-08-17
Payer: MEDICARE

## 2021-08-17 VITALS
OXYGEN SATURATION: 99 % | DIASTOLIC BLOOD PRESSURE: 93 MMHG | HEART RATE: 61 BPM | BODY MASS INDEX: 26.23 KG/M2 | RESPIRATION RATE: 18 BRPM | SYSTOLIC BLOOD PRESSURE: 170 MMHG | TEMPERATURE: 97 F | WEIGHT: 169.07 LBS | HEIGHT: 67.4 IN

## 2021-08-17 DIAGNOSIS — R59.9 ENLARGED LYMPH NODES, UNSPECIFIED: ICD-10-CM

## 2021-08-17 DIAGNOSIS — N28.89 OTHER SPECIFIED DISORDERS OF KIDNEY AND URETER: ICD-10-CM

## 2021-08-17 LAB
BASOPHILS # BLD AUTO: 0.04 K/UL — SIGNIFICANT CHANGE UP (ref 0–0.2)
BASOPHILS NFR BLD AUTO: 0.6 % — SIGNIFICANT CHANGE UP (ref 0–2)
EOSINOPHIL # BLD AUTO: 0.11 K/UL — SIGNIFICANT CHANGE UP (ref 0–0.5)
EOSINOPHIL NFR BLD AUTO: 1.6 % — SIGNIFICANT CHANGE UP (ref 0–6)
HCT VFR BLD CALC: 45.5 % — SIGNIFICANT CHANGE UP (ref 39–50)
HGB BLD-MCNC: 15.4 G/DL — SIGNIFICANT CHANGE UP (ref 13–17)
IMM GRANULOCYTES NFR BLD AUTO: 0.4 % — SIGNIFICANT CHANGE UP (ref 0–1.5)
LYMPHOCYTES # BLD AUTO: 2.14 K/UL — SIGNIFICANT CHANGE UP (ref 1–3.3)
LYMPHOCYTES # BLD AUTO: 31.6 % — SIGNIFICANT CHANGE UP (ref 13–44)
MCHC RBC-ENTMCNC: 30.1 PG — SIGNIFICANT CHANGE UP (ref 27–34)
MCHC RBC-ENTMCNC: 33.8 G/DL — SIGNIFICANT CHANGE UP (ref 32–36)
MCV RBC AUTO: 88.9 FL — SIGNIFICANT CHANGE UP (ref 80–100)
MONOCYTES # BLD AUTO: 0.52 K/UL — SIGNIFICANT CHANGE UP (ref 0–0.9)
MONOCYTES NFR BLD AUTO: 7.7 % — SIGNIFICANT CHANGE UP (ref 2–14)
NEUTROPHILS # BLD AUTO: 3.93 K/UL — SIGNIFICANT CHANGE UP (ref 1.8–7.4)
NEUTROPHILS NFR BLD AUTO: 58.1 % — SIGNIFICANT CHANGE UP (ref 43–77)
NRBC # BLD: 0 /100 WBCS — SIGNIFICANT CHANGE UP (ref 0–0)
PLATELET # BLD AUTO: 187 K/UL — SIGNIFICANT CHANGE UP (ref 150–400)
RBC # BLD: 5.12 M/UL — SIGNIFICANT CHANGE UP (ref 4.2–5.8)
RBC # FLD: 13.2 % — SIGNIFICANT CHANGE UP (ref 10.3–14.5)
WBC # BLD: 6.77 K/UL — SIGNIFICANT CHANGE UP (ref 3.8–10.5)
WBC # FLD AUTO: 6.77 K/UL — SIGNIFICANT CHANGE UP (ref 3.8–10.5)

## 2021-08-17 PROCEDURE — 99214 OFFICE O/P EST MOD 30 MIN: CPT

## 2021-08-17 NOTE — HISTORY OF PRESENT ILLNESS
[de-identified] : 70 y/o M with longstanding history of hypertension, with aortic dissection in 2016 with prolonged hospital course, was very critically ill at the time, now with persistent fatigue and exercise intolerance referred to me for polycythemia on lab work. Patient mentions that he was initially under the impression that it was because he was dehydrated, but his doctor became concerned when his electrolytes didn't support that. He reports he remembers being told in the past it was slightly elevated but fluctuated to normal. No flushing or pruritus after warm shower. Denies any chest pains or dyspnea at rest, only has dyspnea on exertion ever since his incident with the aortic dissection. No thrombosis in the past. No family history of hematologic disorders. Patient is a non-smoker, wife reports he does not snore and he has no trouble sleeping, no frequent night-time awakening. \par \par Patient erythropoietin level at 13.1, Oxygen p50 normal. Hemoglobin normalized without intervention. Patient was also found with elevated kappa free light chain with an elevated ratio at very low level, no other signs of monoclonal gammopathy. This ratio uptrending slightly, so following q6months at this point. [de-identified] : Patient blood pressure elevated in the office today. Made aware, he monitors at home. He reports that he feels overall very well lately. He will be getting surgery with Dr. Vanegas in two weeks for renal mass which is 1.6 cm. He does not have any symptoms this was an incidental finding. In addition on abdominal MRI there was enlarged lymph nodes in the RP space. As per patient, Dr. Vanegas plans  excise the larger lymph node. He has noticed a weight loss of around 10-15 pounds over the past year. No abdominal pain or swelling. No night sweats ,no pruritis.

## 2021-08-17 NOTE — RESULTS/DATA
[FreeTextEntry1] : EXAM:  MR ANGIO CHEST NOT CARD IC\par PROCEDURE DATE:  07/29/2021\par \par \par \par FINDINGS:\par \par HEART AND VASCULATURE: Normal heart size without pericardial effusion.\par \par There is a left sided aortic arch with bovine branching pattern of the great vessels. No dissection or intramural hematoma. Status post ascending aortic replacement.\par \par Thoracic aorta  luminal measurements:\par \par *  Aortic root: 4 cm (sinus-to-commissure), unchanged\par *  Mid ascending aorta at the level of the right pulmonary artery: 3 cm, unchanged\par *  Transverse arch: 4.8 cm, previously 4.7 cm\par *  Mid descending at the level of the left superior pulmonary vein: 3.3 cm, unchanged\par *  Diaphragm level: 2.8 cm, unchanged\par \par LUNGS, PLEURA:  No lung mass or consolidation. No pleural effusion.\par \par MEDIASTINUM, LYMPH NODES: No lymphadenopathy.\par \par UPPER ABDOMEN: Within normal limits.\par \par BONES AND SOFT TISSUES: Within normal limits.\par \par LOWER NECK: Within normal limits.\par \par IMPRESSION:\par \par The aortic arch measures up to 4.8 cm, previously 4.7 cm.\par \par \par \par \par  \par \par \par EXAM:  MR ANGIO ABDOMEN WAW IC\par PROCEDURE DATE:  07/27/2021\par \par FINDINGS:\par LIVER: Normal in size. Subcentimeter right hepatic cyst is stable.\par BILE DUCTS: Normal caliber.\par GALLBLADDER: Cholecystectomy.\par SPLEEN: Within normal limits.\par PANCREAS: Numerous small pancreatic cysts are stable, largest measuring 9 mm in the pancreatic tail. No main pancreatic duct dilatation\par ADRENALS: Mild nodular thickening of left adrenal unchanged since 6/22/2017\par KIDNEYS/URETERS: A 1.6 x 1.5 cm left anterior mid renal cyst demonstrates internal solid enhancement, concerning for neoplasm. Small bilateral renal cysts. No hydronephrosis.\par \par VISUALIZED PORTIONS:\par BOWEL: Within normal limits.\par PERITONEUM: No ascites.\par VESSELS: Tortuous, ectatic abdominal aorta without evidence of dissection or aneurysm. The celiac artery, SMA, MITZY and bilateral renal arteries, including an accessory right renal artery, are patent. The bilateral common, external and internal iliac arteries are also patent. The renal veins and IVC are patent.\par RETROPERITONEUM/LYMPH NODES: New retroperitoneal adenopathy with reference nodes as follows:\par Para-aortic node measuring 2.2 x 1.4 cm (23, 42).\par Left common iliac lymph node measures 1.9 x 1.3 cm (23, 62).\par ABDOMINAL WALL: Within normal limits.\par \par IMPRESSION:\par No evidence of abdominal aortic aneurysm or dissection.\par \par Note made of a complex 1.6 cm left mid renal cyst with internal nodular enhancement, highly suspicious for neoplasm.\par \par New retroperitoneal adenopathy.\par

## 2021-08-17 NOTE — PHYSICAL EXAM
[Restricted in physically strenuous activity but ambulatory and able to carry out work of a light or sedentary nature] : Status 1- Restricted in physically strenuous activity but ambulatory and able to carry out work of a light or sedentary nature, e.g., light house work, office work [de-identified] : Cannot perform physical exam due to nature of telemedicine visit, however on telemedicine patient appears to not be in any acute distress

## 2021-08-17 NOTE — ASSESSMENT
[FreeTextEntry1] : 68 y/o M with history of aortic dissection in 2016, longstanding history of HTN, and peripheral neuropathy in feet ever since his aortic dissection surgery here for follow up of polycythemia, now pending surgery for renal mass removal and lymph node excisional biopsy. \par \par -Hemoglobin/hematocrit completely normalized at this point, and workup was unremarkable. \par -No symptoms of primary MPN. Can follow up H/H annually at this point. \par -Although RCC can be associated with a secondary polycythemia, this self resolved and erythropoietin was not elevated so likely mostly a coincidence. \par -Continue close cardiology follow up for aneurysm / elevated BP. \par -Elevated free light chains - unclear etiology, no evidence of monoclonal gammopathy but ratio uptrending. Will recheck. This can be related to RP lymphadenopathy pending biopsy during surgery. Will have reviewed by hematopathology. \par -Patient understands and agrees with plan. All information explained to the best of my ability.\par -RTC in 1 month to follow up biopsy results. \par

## 2021-08-19 ENCOUNTER — OUTPATIENT (OUTPATIENT)
Dept: OUTPATIENT SERVICES | Facility: HOSPITAL | Age: 71
LOS: 1 days | End: 2021-08-19
Payer: MEDICARE

## 2021-08-19 ENCOUNTER — APPOINTMENT (OUTPATIENT)
Dept: CARDIOLOGY | Facility: CLINIC | Age: 71
End: 2021-08-19
Payer: MEDICARE

## 2021-08-19 ENCOUNTER — NON-APPOINTMENT (OUTPATIENT)
Age: 71
End: 2021-08-19

## 2021-08-19 ENCOUNTER — APPOINTMENT (OUTPATIENT)
Dept: CV DIAGNOSITCS | Facility: HOSPITAL | Age: 71
End: 2021-08-19

## 2021-08-19 VITALS
HEART RATE: 58 BPM | OXYGEN SATURATION: 98 % | BODY MASS INDEX: 25.54 KG/M2 | HEIGHT: 67.4 IN | DIASTOLIC BLOOD PRESSURE: 75 MMHG | SYSTOLIC BLOOD PRESSURE: 117 MMHG

## 2021-08-19 VITALS — BODY MASS INDEX: 25.54 KG/M2 | WEIGHT: 165 LBS

## 2021-08-19 DIAGNOSIS — Z98.890 OTHER SPECIFIED POSTPROCEDURAL STATES: Chronic | ICD-10-CM

## 2021-08-19 DIAGNOSIS — D03.9 MELANOMA IN SITU, UNSPECIFIED: Chronic | ICD-10-CM

## 2021-08-19 DIAGNOSIS — I10 ESSENTIAL (PRIMARY) HYPERTENSION: ICD-10-CM

## 2021-08-19 DIAGNOSIS — Z90.49 ACQUIRED ABSENCE OF OTHER SPECIFIED PARTS OF DIGESTIVE TRACT: Chronic | ICD-10-CM

## 2021-08-19 PROCEDURE — 93000 ELECTROCARDIOGRAM COMPLETE: CPT

## 2021-08-19 PROCEDURE — 93306 TTE W/DOPPLER COMPLETE: CPT | Mod: 26

## 2021-08-19 PROCEDURE — 99214 OFFICE O/P EST MOD 30 MIN: CPT

## 2021-08-23 ENCOUNTER — APPOINTMENT (OUTPATIENT)
Dept: INTERNAL MEDICINE | Facility: CLINIC | Age: 71
End: 2021-08-23

## 2021-08-23 ENCOUNTER — RESULT REVIEW (OUTPATIENT)
Age: 71
End: 2021-08-23

## 2021-08-23 DIAGNOSIS — R19.00 INTRA-ABDOMINAL AND PELVIC SWELLING, MASS AND LUMP, UNSPECIFIED SITE: ICD-10-CM

## 2021-08-24 ENCOUNTER — APPOINTMENT (OUTPATIENT)
Dept: CT IMAGING | Facility: CLINIC | Age: 71
End: 2021-08-24
Payer: MEDICARE

## 2021-08-24 ENCOUNTER — OUTPATIENT (OUTPATIENT)
Dept: OUTPATIENT SERVICES | Facility: HOSPITAL | Age: 71
LOS: 1 days | End: 2021-08-24
Payer: MEDICARE

## 2021-08-24 DIAGNOSIS — R19.00 INTRA-ABDOMINAL AND PELVIC SWELLING, MASS AND LUMP, UNSPECIFIED SITE: ICD-10-CM

## 2021-08-24 DIAGNOSIS — Z98.890 OTHER SPECIFIED POSTPROCEDURAL STATES: Chronic | ICD-10-CM

## 2021-08-24 DIAGNOSIS — D03.9 MELANOMA IN SITU, UNSPECIFIED: Chronic | ICD-10-CM

## 2021-08-24 DIAGNOSIS — Z90.49 ACQUIRED ABSENCE OF OTHER SPECIFIED PARTS OF DIGESTIVE TRACT: Chronic | ICD-10-CM

## 2021-08-24 PROCEDURE — 74150 CT ABDOMEN W/O CONTRAST: CPT | Mod: 26,MH

## 2021-08-24 PROCEDURE — 74150 CT ABDOMEN W/O CONTRAST: CPT

## 2021-08-25 ENCOUNTER — NON-APPOINTMENT (OUTPATIENT)
Age: 71
End: 2021-08-25

## 2021-08-26 DIAGNOSIS — Z01.818 ENCOUNTER FOR OTHER PREPROCEDURAL EXAMINATION: ICD-10-CM

## 2021-08-27 ENCOUNTER — APPOINTMENT (OUTPATIENT)
Dept: DISASTER EMERGENCY | Facility: CLINIC | Age: 71
End: 2021-08-27

## 2021-08-27 NOTE — ASU PATIENT PROFILE, ADULT - ANESTHESIA, PREVIOUS REACTION, PROFILE
"s/p colonoscopy developed hypotension, within the last 2 yrs, never happened with surgeries prior to colonoscopy"  denies family hx/hypotension

## 2021-08-27 NOTE — ASU PATIENT PROFILE, ADULT - NSICDXPASTMEDICALHX_GEN_ALL_CORE_FT
PAST MEDICAL HISTORY:  Anxiety     Aortic dissection, thoracic     Central retinal vein thrombosis right    Central vein occlusion of retina 2015    GERD (gastroesophageal reflux disease)     Hypertension     Melanoma in situ right arm; 2014    Neuroma right groin s/p aortic aneurysm repair 2013    Other specified disorders of kidney and ureter     Polycythemia

## 2021-08-27 NOTE — ASU PATIENT PROFILE, ADULT - NSICDXPASTSURGICALHX_GEN_ALL_CORE_FT
PAST SURGICAL HISTORY:  History of loop recorder [employer] LNQ22 sn [id number]    History of tonsillectomy and adenoidectomy at age 3    Melanoma in situ Excision of menaloma of right arm; 2014    S/P aortic dissection repair     S/P cholecystectomy 2017

## 2021-08-29 ENCOUNTER — TRANSCRIPTION ENCOUNTER (OUTPATIENT)
Age: 71
End: 2021-08-29

## 2021-08-29 LAB — SARS-COV-2 N GENE NPH QL NAA+PROBE: NOT DETECTED

## 2021-08-30 ENCOUNTER — INPATIENT (INPATIENT)
Facility: HOSPITAL | Age: 71
LOS: 0 days | Discharge: HOME | End: 2021-08-31
Attending: SPECIALIST | Admitting: SPECIALIST
Payer: MEDICARE

## 2021-08-30 ENCOUNTER — RESULT REVIEW (OUTPATIENT)
Age: 71
End: 2021-08-30

## 2021-08-30 ENCOUNTER — APPOINTMENT (OUTPATIENT)
Dept: UROLOGY | Facility: HOSPITAL | Age: 71
End: 2021-08-30

## 2021-08-30 VITALS
WEIGHT: 166.01 LBS | HEIGHT: 67.5 IN | HEART RATE: 68 BPM | DIASTOLIC BLOOD PRESSURE: 92 MMHG | SYSTOLIC BLOOD PRESSURE: 148 MMHG | TEMPERATURE: 98 F | RESPIRATION RATE: 18 BRPM | OXYGEN SATURATION: 99 %

## 2021-08-30 DIAGNOSIS — Z98.890 OTHER SPECIFIED POSTPROCEDURAL STATES: Chronic | ICD-10-CM

## 2021-08-30 DIAGNOSIS — D03.9 MELANOMA IN SITU, UNSPECIFIED: Chronic | ICD-10-CM

## 2021-08-30 DIAGNOSIS — N28.89 OTHER SPECIFIED DISORDERS OF KIDNEY AND URETER: ICD-10-CM

## 2021-08-30 DIAGNOSIS — Z90.49 ACQUIRED ABSENCE OF OTHER SPECIFIED PARTS OF DIGESTIVE TRACT: Chronic | ICD-10-CM

## 2021-08-30 PROCEDURE — 50543 LAPARO PARTIAL NEPHRECTOMY: CPT | Mod: LT

## 2021-08-30 PROCEDURE — 88360 TUMOR IMMUNOHISTOCHEM/MANUAL: CPT | Mod: 26

## 2021-08-30 PROCEDURE — 88189 FLOWCYTOMETRY/READ 16 & >: CPT

## 2021-08-30 PROCEDURE — 76998 US GUIDE INTRAOP: CPT | Mod: 26,LT

## 2021-08-30 PROCEDURE — 38570 LAPAROSCOPY LYMPH NODE BIOP: CPT

## 2021-08-30 PROCEDURE — 88341 IMHCHEM/IMCYTCHM EA ADD ANTB: CPT | Mod: 26,59

## 2021-08-30 PROCEDURE — 88342 IMHCHEM/IMCYTCHM 1ST ANTB: CPT | Mod: 26,59

## 2021-08-30 PROCEDURE — 88312 SPECIAL STAINS GROUP 1: CPT | Mod: 26

## 2021-08-30 RX ORDER — SENNA PLUS 8.6 MG/1
2 TABLET ORAL AT BEDTIME
Refills: 0 | Status: DISCONTINUED | OUTPATIENT
Start: 2021-08-30 | End: 2021-08-31

## 2021-08-30 RX ORDER — FAMOTIDINE 10 MG/ML
40 INJECTION INTRAVENOUS DAILY
Refills: 0 | Status: DISCONTINUED | OUTPATIENT
Start: 2021-08-30 | End: 2021-08-31

## 2021-08-30 RX ORDER — HYDRALAZINE HCL 50 MG
100 TABLET ORAL THREE TIMES A DAY
Refills: 0 | Status: DISCONTINUED | OUTPATIENT
Start: 2021-08-30 | End: 2021-08-31

## 2021-08-30 RX ORDER — NIACIN 50 MG
500 TABLET ORAL AT BEDTIME
Refills: 0 | Status: DISCONTINUED | OUTPATIENT
Start: 2021-08-30 | End: 2021-08-31

## 2021-08-30 RX ORDER — METOPROLOL TARTRATE 50 MG
37.5 TABLET ORAL
Refills: 0 | Status: DISCONTINUED | OUTPATIENT
Start: 2021-08-30 | End: 2021-08-31

## 2021-08-30 RX ORDER — METOPROLOL TARTRATE 50 MG
25 TABLET ORAL DAILY
Refills: 0 | Status: DISCONTINUED | OUTPATIENT
Start: 2021-08-30 | End: 2021-08-30

## 2021-08-30 RX ORDER — SERTRALINE 25 MG/1
100 TABLET, FILM COATED ORAL DAILY
Refills: 0 | Status: DISCONTINUED | OUTPATIENT
Start: 2021-08-30 | End: 2021-08-31

## 2021-08-30 RX ORDER — METOPROLOL TARTRATE 50 MG
37.5 TABLET ORAL
Refills: 0 | Status: DISCONTINUED | OUTPATIENT
Start: 2021-08-30 | End: 2021-08-30

## 2021-08-30 RX ORDER — ASPIRIN/CALCIUM CARB/MAGNESIUM 324 MG
81 TABLET ORAL DAILY
Refills: 0 | Status: DISCONTINUED | OUTPATIENT
Start: 2021-08-30 | End: 2021-08-31

## 2021-08-30 RX ORDER — AMLODIPINE BESYLATE 2.5 MG/1
10 TABLET ORAL DAILY
Refills: 0 | Status: DISCONTINUED | OUTPATIENT
Start: 2021-08-30 | End: 2021-08-31

## 2021-08-30 RX ORDER — OXYCODONE HYDROCHLORIDE 5 MG/1
5 TABLET ORAL EVERY 6 HOURS
Refills: 0 | Status: DISCONTINUED | OUTPATIENT
Start: 2021-08-30 | End: 2021-08-31

## 2021-08-30 RX ORDER — SODIUM CHLORIDE 9 MG/ML
1000 INJECTION, SOLUTION INTRAVENOUS
Refills: 0 | Status: DISCONTINUED | OUTPATIENT
Start: 2021-08-30 | End: 2021-08-30

## 2021-08-30 RX ORDER — SODIUM CHLORIDE 9 MG/ML
1000 INJECTION, SOLUTION INTRAVENOUS
Refills: 0 | Status: DISCONTINUED | OUTPATIENT
Start: 2021-08-30 | End: 2021-08-31

## 2021-08-30 RX ORDER — HEPARIN SODIUM 5000 [USP'U]/ML
5000 INJECTION INTRAVENOUS; SUBCUTANEOUS EVERY 8 HOURS
Refills: 0 | Status: DISCONTINUED | OUTPATIENT
Start: 2021-08-30 | End: 2021-08-31

## 2021-08-30 RX ORDER — ACETAMINOPHEN 500 MG
975 TABLET ORAL EVERY 6 HOURS
Refills: 0 | Status: DISCONTINUED | OUTPATIENT
Start: 2021-08-30 | End: 2021-08-31

## 2021-08-30 RX ORDER — ONDANSETRON 8 MG/1
4 TABLET, FILM COATED ORAL EVERY 6 HOURS
Refills: 0 | Status: DISCONTINUED | OUTPATIENT
Start: 2021-08-30 | End: 2021-08-31

## 2021-08-30 RX ORDER — GABAPENTIN 400 MG/1
100 CAPSULE ORAL THREE TIMES A DAY
Refills: 0 | Status: DISCONTINUED | OUTPATIENT
Start: 2021-08-30 | End: 2021-08-31

## 2021-08-30 RX ORDER — METOPROLOL TARTRATE 50 MG
25 TABLET ORAL DAILY
Refills: 0 | Status: DISCONTINUED | OUTPATIENT
Start: 2021-08-31 | End: 2021-08-31

## 2021-08-30 RX ADMIN — Medication 500 MILLIGRAM(S): at 22:24

## 2021-08-30 RX ADMIN — GABAPENTIN 100 MILLIGRAM(S): 400 CAPSULE ORAL at 21:25

## 2021-08-30 RX ADMIN — Medication 100 MILLIGRAM(S): at 21:25

## 2021-08-30 RX ADMIN — HEPARIN SODIUM 5000 UNIT(S): 5000 INJECTION INTRAVENOUS; SUBCUTANEOUS at 21:25

## 2021-08-30 RX ADMIN — Medication 975 MILLIGRAM(S): at 23:17

## 2021-08-30 RX ADMIN — SODIUM CHLORIDE 125 MILLILITER(S): 9 INJECTION, SOLUTION INTRAVENOUS at 21:24

## 2021-08-30 NOTE — PATIENT PROFILE ADULT - OVER THE PAST TWO WEEKS HAVE YOU FELT DOWN, DEPRESSED OR HOPELESS?
Called and left message for return call, number provided       When patient returns call please advise MRI results did come back in the normal category, PIRADs 2 lesion  As discussed in office visit on 7/6/21, repeat prostate biopsy recommended       Can then assist with scheduling patient, next available with Dr Karie Ronquillo as well as two week results appt  no

## 2021-08-30 NOTE — BRIEF OPERATIVE NOTE - OPERATION/FINDINGS
Multiple enlarged lymph nodes along the aorta.  3 sent for pathology.  Left lap partial nephrectomy.  Off clamp

## 2021-08-31 ENCOUNTER — TRANSCRIPTION ENCOUNTER (OUTPATIENT)
Age: 71
End: 2021-08-31

## 2021-08-31 VITALS
RESPIRATION RATE: 16 BRPM | HEART RATE: 77 BPM | TEMPERATURE: 97 F | OXYGEN SATURATION: 98 % | DIASTOLIC BLOOD PRESSURE: 88 MMHG | SYSTOLIC BLOOD PRESSURE: 146 MMHG

## 2021-08-31 DIAGNOSIS — R76.8 OTHER SPECIFIED ABNORMAL IMMUNOLOGICAL FINDINGS IN SERUM: ICD-10-CM

## 2021-08-31 DIAGNOSIS — I10 ESSENTIAL (PRIMARY) HYPERTENSION: ICD-10-CM

## 2021-08-31 DIAGNOSIS — I71.2 THORACIC AORTIC ANEURYSM, WITHOUT RUPTURE: ICD-10-CM

## 2021-08-31 DIAGNOSIS — Z29.9 ENCOUNTER FOR PROPHYLACTIC MEASURES, UNSPECIFIED: ICD-10-CM

## 2021-08-31 LAB
ANION GAP SERPL CALC-SCNC: 13 MMOL/L — SIGNIFICANT CHANGE UP (ref 7–14)
BASOPHILS # BLD AUTO: 0.01 K/UL — SIGNIFICANT CHANGE UP (ref 0–0.2)
BASOPHILS NFR BLD AUTO: 0.1 % — SIGNIFICANT CHANGE UP (ref 0–2)
BUN SERPL-MCNC: 26 MG/DL — HIGH (ref 7–23)
CALCIUM SERPL-MCNC: 9.7 MG/DL — SIGNIFICANT CHANGE UP (ref 8.4–10.5)
CHLORIDE SERPL-SCNC: 99 MMOL/L — SIGNIFICANT CHANGE UP (ref 98–107)
CO2 SERPL-SCNC: 24 MMOL/L — SIGNIFICANT CHANGE UP (ref 22–31)
COVID-19 SPIKE DOMAIN AB INTERP: POSITIVE
COVID-19 SPIKE DOMAIN ANTIBODY RESULT: >250 U/ML — HIGH
CREAT SERPL-MCNC: 0.99 MG/DL — SIGNIFICANT CHANGE UP (ref 0.5–1.3)
EOSINOPHIL # BLD AUTO: 0 K/UL — SIGNIFICANT CHANGE UP (ref 0–0.5)
EOSINOPHIL NFR BLD AUTO: 0 % — SIGNIFICANT CHANGE UP (ref 0–6)
GLUCOSE SERPL-MCNC: 137 MG/DL — HIGH (ref 70–99)
HCT VFR BLD CALC: 45.5 % — SIGNIFICANT CHANGE UP (ref 39–50)
HGB BLD-MCNC: 15.5 G/DL — SIGNIFICANT CHANGE UP (ref 13–17)
IANC: 7.23 K/UL — SIGNIFICANT CHANGE UP (ref 1.5–8.5)
IMM GRANULOCYTES NFR BLD AUTO: 0.2 % — SIGNIFICANT CHANGE UP (ref 0–1.5)
LYMPHOCYTES # BLD AUTO: 0.78 K/UL — LOW (ref 1–3.3)
LYMPHOCYTES # BLD AUTO: 9.1 % — LOW (ref 13–44)
MCHC RBC-ENTMCNC: 29.1 PG — SIGNIFICANT CHANGE UP (ref 27–34)
MCHC RBC-ENTMCNC: 34.1 GM/DL — SIGNIFICANT CHANGE UP (ref 32–36)
MCV RBC AUTO: 85.5 FL — SIGNIFICANT CHANGE UP (ref 80–100)
MONOCYTES # BLD AUTO: 0.5 K/UL — SIGNIFICANT CHANGE UP (ref 0–0.9)
MONOCYTES NFR BLD AUTO: 5.9 % — SIGNIFICANT CHANGE UP (ref 2–14)
NEUTROPHILS # BLD AUTO: 7.23 K/UL — SIGNIFICANT CHANGE UP (ref 1.8–7.4)
NEUTROPHILS NFR BLD AUTO: 84.7 % — HIGH (ref 43–77)
NRBC # BLD: 0 /100 WBCS — SIGNIFICANT CHANGE UP
NRBC # FLD: 0 K/UL — SIGNIFICANT CHANGE UP
PLATELET # BLD AUTO: 149 K/UL — LOW (ref 150–400)
POTASSIUM SERPL-MCNC: 3.6 MMOL/L — SIGNIFICANT CHANGE UP (ref 3.5–5.3)
POTASSIUM SERPL-SCNC: 3.6 MMOL/L — SIGNIFICANT CHANGE UP (ref 3.5–5.3)
RBC # BLD: 5.32 M/UL — SIGNIFICANT CHANGE UP (ref 4.2–5.8)
RBC # FLD: 13.2 % — SIGNIFICANT CHANGE UP (ref 10.3–14.5)
SARS-COV-2 IGG+IGM SERPL QL IA: >250 U/ML — HIGH
SARS-COV-2 IGG+IGM SERPL QL IA: POSITIVE
SODIUM SERPL-SCNC: 136 MMOL/L — SIGNIFICANT CHANGE UP (ref 135–145)
TM INTERPRETATION: SIGNIFICANT CHANGE UP
WBC # BLD: 8.54 K/UL — SIGNIFICANT CHANGE UP (ref 3.8–10.5)
WBC # FLD AUTO: 8.54 K/UL — SIGNIFICANT CHANGE UP (ref 3.8–10.5)

## 2021-08-31 PROCEDURE — 99222 1ST HOSP IP/OBS MODERATE 55: CPT

## 2021-08-31 RX ORDER — OXYCODONE HYDROCHLORIDE 5 MG/1
1 TABLET ORAL
Qty: 12 | Refills: 0
Start: 2021-08-31 | End: 2021-09-02

## 2021-08-31 RX ORDER — ACETAMINOPHEN 500 MG
3 TABLET ORAL
Qty: 0 | Refills: 0 | DISCHARGE
Start: 2021-08-31

## 2021-08-31 RX ORDER — SODIUM CHLORIDE 9 MG/ML
1000 INJECTION, SOLUTION INTRAVENOUS
Refills: 0 | Status: DISCONTINUED | OUTPATIENT
Start: 2021-08-31 | End: 2021-08-31

## 2021-08-31 RX ADMIN — HEPARIN SODIUM 5000 UNIT(S): 5000 INJECTION INTRAVENOUS; SUBCUTANEOUS at 05:52

## 2021-08-31 RX ADMIN — AMLODIPINE BESYLATE 10 MILLIGRAM(S): 2.5 TABLET ORAL at 05:53

## 2021-08-31 RX ADMIN — Medication 100 MILLIGRAM(S): at 05:52

## 2021-08-31 RX ADMIN — GABAPENTIN 100 MILLIGRAM(S): 400 CAPSULE ORAL at 05:53

## 2021-08-31 RX ADMIN — Medication 975 MILLIGRAM(S): at 05:53

## 2021-08-31 RX ADMIN — Medication 37.5 MILLIGRAM(S): at 05:53

## 2021-08-31 RX ADMIN — Medication 975 MILLIGRAM(S): at 11:52

## 2021-08-31 RX ADMIN — Medication 81 MILLIGRAM(S): at 11:52

## 2021-08-31 RX ADMIN — OXYCODONE HYDROCHLORIDE 5 MILLIGRAM(S): 5 TABLET ORAL at 12:40

## 2021-08-31 RX ADMIN — Medication 10 MILLIGRAM(S): at 05:15

## 2021-08-31 RX ADMIN — OXYCODONE HYDROCHLORIDE 5 MILLIGRAM(S): 5 TABLET ORAL at 13:15

## 2021-08-31 RX ADMIN — SODIUM CHLORIDE 75 MILLILITER(S): 9 INJECTION, SOLUTION INTRAVENOUS at 09:10

## 2021-08-31 RX ADMIN — SERTRALINE 100 MILLIGRAM(S): 25 TABLET, FILM COATED ORAL at 11:52

## 2021-08-31 RX ADMIN — FAMOTIDINE 40 MILLIGRAM(S): 10 INJECTION INTRAVENOUS at 11:52

## 2021-08-31 NOTE — DISCHARGE NOTE PROVIDER - NSDCFUSCHEDAPPT_GEN_ALL_CORE_FT
MARIALUISA MONTGOMERY ; 09/09/2021 ; [facility] [phone]  MARIALUISA MONTGOMERY ; 09/16/2021 ; [facility]  MARIALUISA MONTGOMERY ; 09/30/2021 ; [facility] [phone]

## 2021-08-31 NOTE — CONSULT NOTE ADULT - PROBLEM SELECTOR RECOMMENDATION 2
BP mildly elevated, likely due to strees, pain   -Cont Amlodipine, Hydralazine and Metoprolol  -Monitor BP

## 2021-08-31 NOTE — CONSULT NOTE ADULT - PROBLEM SELECTOR RECOMMENDATION 9
(+) left renal mass, s/p lap partial left nephrectomy, POD#1  -Management as per   -Pain control  -Bowel regimen  -Incentive spirometer  -OOB and ambulate  -DVT prophylaxis

## 2021-08-31 NOTE — PROGRESS NOTE ADULT - SUBJECTIVE AND OBJECTIVE BOX
Subjective  Patient denies abdominal pain, nausea, vomiting    Objective    Vital signs  T(F): , Max: 98.4 (08-31-21 @ 05:11)  HR: 89 (08-31-21 @ 05:11)  BP: 159/90 (08-31-21 @ 05:11)  SpO2: 100% (08-31-21 @ 05:11)  Wt(kg): --    Output     08-30 @ 07:01  -  08-31 @ 05:37  --------------------------------------------------------  IN: 125 mL / OUT: 1378 mL / NET: -1253 mL        Gen: No distress observed  Abd: Soft, non-distended, + steristrips  : + patino, clear urine    Labs        Urine Cx: ?  Blood Cx: ?    Imaging

## 2021-08-31 NOTE — DISCHARGE NOTE PROVIDER - NSDCCPCAREPLAN_GEN_ALL_CORE_FT
PRINCIPAL DISCHARGE DIAGNOSIS  Diagnosis: Other specified disorders of kidney and ureter  Assessment and Plan of Treatment: Drink plenty of fluids.  No heavy lifting (greater than 10 pounds) or straining for 4 to 6 weeks.  You may shower, just pat white strips dry, they will fall off in a few weeks.  Do not drive when taking pain medication.  Call 's   office  to schedule a follow up appointment.  Call the office if you have fever greater than 101, difficulty urinating, pain not relieved with pain medication, nausea/vomiting..

## 2021-08-31 NOTE — DISCHARGE NOTE PROVIDER - CARE PROVIDER_API CALL
Jose F Vanegas  Urology  [address] [place] [address]  Phone: [phone]  Fax: [phone]  Follow Up Time:

## 2021-08-31 NOTE — DISCHARGE NOTE PROVIDER - HOSPITAL COURSE
Pt had L. lap partial neph yesterday; did well; passed gas, had BM and nirmala reg diet; voiding well; labs stable; ambulating; pain controlled; home today.

## 2021-08-31 NOTE — DISCHARGE NOTE NURSING/CASE MANAGEMENT/SOCIAL WORK - PATIENT PORTAL LINK FT
You can access the FollowMyHealth Patient Portal offered by [facility] by registering at the following website: [email or web address]. By joining [employer]’s FollowMyHealth portal, you will also be able to view your health information using other applications (apps) compatible with our system.

## 2021-08-31 NOTE — PROGRESS NOTE ADULT - ASSESSMENT
s/p Left lap partial nephrectomy  -Pain management  -Clear liquid diet  -IVF  -Labs
s/p Left lap partial nephrectomy  8/31 - doing well; passed gas and had BM  Plan:  - reg diet  - d/c patino

## 2021-08-31 NOTE — CONSULT NOTE ADULT - ASSESSMENT
71 y/o male with h/o HTN, dissecting aneurysm of ascending aorta, s/p repair surgery, renal mass admitted for left laparoscopic partial nephrectomy and removal of lymph node yesterday. S/P OR, POD#1.

## 2021-08-31 NOTE — DISCHARGE NOTE PROVIDER - NSDCACTIVITY_GEN_ALL_CORE
Showering allowed/Stairs allowed/Walking - Indoors allowed/No heavy lifting/straining/Walking - Outdoors allowed/Follow Instructions Provided by your Surgical Team

## 2021-08-31 NOTE — DISCHARGE NOTE PROVIDER - NSDCMRMEDTOKEN_GEN_ALL_CORE_FT
acetaminophen 325 mg oral tablet: 3 tab(s) orally every 6 hours  Aller-Carlos 10 mg oral tablet: 1 tab(s) orally once a day, As Needed  amLODIPine 10 mg oral tablet: 1 tab(s) orally once a day  aspirin 81 mg oral tablet: 1 tab(s) orally once a day, last dose 8/22/2021  famotidine 40 mg oral tablet: 1 tab(s) orally once a day  gabapentin 100 mg oral tablet: 2 tab(s) orally 3 times a day  hydrALAZINE 100 mg oral tablet: 1 tab(s) orally 3 times a day  Klor-Con M20 oral tablet, extended release: 1 tab(s) orally once a day  metoprolol tartrate 25 mg oral tablet: 1.5 tab(s) orally 2 times a day  metoprolol tartrate 25 mg oral tablet: 1 tab(s) orally once a day (in the afternoon)  Multiple Vitamins oral tablet: 1 tab(s) orally once a day  Niaspan  mg oral tablet, extended release: 1 tab(s) orally once a day (at bedtime)  oxyCODONE 5 mg oral tablet: 1 tab(s) orally every 6 hours, As needed, Moderate Pain (4 - 6) MDD:4  sertraline 100 mg oral tablet: 1 tab(s) orally once a day  Vitamin D3 50 mcg (2000 intl units) oral tablet: 1 tab(s) orally once a day

## 2021-08-31 NOTE — DISCHARGE NOTE NURSING/CASE MANAGEMENT/SOCIAL WORK - NSDCPNINST_GEN_ALL_CORE
Make a follow up appointment with Dr. Vanegas. Call MD if you develop a fever, or if there is redness, swelling, drainage or pain not relieved by pain medication. No heavy lifting, bending, or straining to move your bowels. Take over the counter stool softeners as needed to prevent constipation which may be caused by pain medication. Drink plenty of liquids.

## 2021-08-31 NOTE — CONSULT NOTE ADULT - SUBJECTIVE AND OBJECTIVE BOX
Patient is a 70y old  Male who presents with a chief complaint of "I'm having kidney surgery." (11 Aug 2021 10:58)      HPI:  71 y/o male with h/o HTN, dissecting aneurysm of ascending aorta, s/p repair surgery, renal mass admitted for left laparoscopic partial nephrectomy and removal of lymph node yesterday. S/P OR, POD#1. Pt has no complaints today, denies any CP or SOB, (+) flatus, (+) BM this morning, passed TOV.       History limited due to: [ ] Dementia  [ ] Delirium  [ ] Condition    Pain Symptoms if applicable:             	                         none	     Pain:	                            0	  Location:	  Modifying factors:	  Associated symptoms:	    Function: [x ] Independent  [ ] Assistance  [ ] Total care  [ ] Non-ambulatory    Allergies    Methylate (Rash)    Intolerances    Keflex (Stomach Upset)      HOME MEDICATIONS: [x ] Reviewed    MEDICATIONS  (STANDING):  acetaminophen   Tablet .. 975 milliGRAM(s) Oral every 6 hours  amLODIPine   Tablet 10 milliGRAM(s) Oral daily  aspirin enteric coated 81 milliGRAM(s) Oral daily  dextrose 5% + sodium chloride 0.45%. 1000 milliLiter(s) (75 mL/Hr) IV Continuous <Continuous>  famotidine    Tablet 40 milliGRAM(s) Oral daily  gabapentin 100 milliGRAM(s) Oral three times a day  heparin   Injectable 5000 Unit(s) SubCutaneous every 8 hours  hydrALAZINE 100 milliGRAM(s) Oral three times a day  metoprolol tartrate 25 milliGRAM(s) Oral daily  metoprolol tartrate 37.5 milliGRAM(s) Oral two times a day  niacin  milliGRAM(s) Oral at bedtime  sertraline 100 milliGRAM(s) Oral daily    MEDICATIONS  (PRN):  ondansetron Injectable 4 milliGRAM(s) IV Push every 6 hours PRN Nausea and/or Vomiting  oxyCODONE    IR 5 milliGRAM(s) Oral every 6 hours PRN Moderate Pain (4 - 6)  senna 2 Tablet(s) Oral at bedtime PRN Constipation      PAST MEDICAL & SURGICAL HISTORY:  Hypertension    Central vein occlusion of retina  2015    Aortic dissection, thoracic    Anxiety    GERD (gastroesophageal reflux disease)    Melanoma in situ  right arm; 2014    Neuroma  right groin s/p aortic aneurysm repair 2013    Other specified disorders of kidney and ureter    Polycythemia    Central retinal vein thrombosis  right    S/P aortic dissection repair    Melanoma in situ  Excision of menaloma of right arm; 2014    History of tonsillectomy and adenoidectomy  at age 3    S/P cholecystectomy  2017    History of loop recorder  medtronic LNQ22 sn [id number]    [x ] Reviewed     SOCIAL HISTORY:  Residence: [ ] [facility]  [ ] SNF  [x] Community  [ ] Substance abuse: denies  [ ] Tobacco: denies  [ ] Alcohol use: denies     FAMILY HISTORY:  Family history of hypertension in mother    [ ] No pertinent family history in first degree relatives     REVIEW OF SYSTEMS:    CONSTITUTIONAL: No fever, weight loss, or fatigue  EYES: No eye pain, visual disturbances, or discharge  ENMT:  No difficulty hearing, tinnitus, vertigo; No sinus or throat pain  NECK: No pain or stiffness  BREASTS: No pain, masses, or nipple discharge  RESPIRATORY: No cough, wheezing, chills or hemoptysis; No shortness of breath  CARDIOVASCULAR: No chest pain, palpitations, dizziness, or leg swelling  GASTROINTESTINAL: No abdominal or epigastric pain. No nausea, vomiting, or hematemesis; No diarrhea or constipation. No melena or hematochezia. (+) flatus and BM post op   GENITOURINARY: No dysuria, frequency, hematuria, or incontinence. Abdalla d/c'ed pt able to urinate.   NEUROLOGICAL: No headaches, memory loss, loss of strength, numbness, or tremors  SKIN: No itching, burning, rashes, or lesions   LYMPH NODES: No enlarged glands  ENDOCRINE: No heat or cold intolerance; No hair loss  MUSCULOSKELETAL: No muscle or back pain  PSYCHIATRIC: No depression, anxiety, mood swings, or difficulty sleeping  HEME/LYMPH: No easy bruising, or bleeding gums  ALLERGY AND IMMUNOLOGIC: No hives or eczema    [  ] All other ROS negative  [  ] Unable to obtain due to poor mental status    Vital Signs Last 24 Hrs  T(C): 36.9 (31 Aug 2021 05:11), Max: 36.9 (31 Aug 2021 05:11)  T(F): 98.4 (31 Aug 2021 05:11), Max: 98.4 (31 Aug 2021 05:11)  HR: 77 (31 Aug 2021 09:52) (68 - 89)  BP: 146/88 (31 Aug 2021 09:52) (120/77 - 159/90)  BP(mean): 87 (30 Aug 2021 23:00) (85 - 100)  RR: 16 (31 Aug 2021 05:11) (16 - 20)  SpO2: 100% (31 Aug 2021 05:11) (95% - 100%)    PHYSICAL EXAM:    GENERAL: NAD, well-groomed, well-developed  HEAD:  Atraumatic, Normocephalic  EYES: EOMI, PERRLA, conjunctiva and sclera clear  ENMT: Moist mucous membranes  NECK: Supple, No JVD  RESPIRATORY: Clear to auscultation bilaterally; No rales, rhonchi, wheezing, or rubs  CARDIOVASCULAR: Regular rate and rhythm; No murmurs, rubs, or gallops  GASTROINTESTINAL: Soft, Nontender, Nondistended; Bowel sounds present  GENITOURINARY: Not examined  EXTREMITIES:  2+ Peripheral Pulses, No clubbing, cyanosis, or edema  NERVOUS SYSTEM:  Alert & Oriented X3; Moving all 4 extremities; No gross sensory deficits  HEME/LYMPH: No lymphadenopathy noted  SKIN: No rashes or lesions; Incisions C/D/I    LABS:                        15.5   8.54  )-----------( 149      ( 31 Aug 2021 07:03 )             45.5     08-31    136  |  99  |  26<H>  ----------------------------<  137<H>  3.6   |  24  |  0.99    Ca    9.7      31 Aug 2021 07:03          CAPILLARY BLOOD GLUCOSE          RADIOLOGY & ADDITIONAL STUDIES:    EKG:   Personally Reviewed:  [x ] YES Sinus at 63, 1st degree AVB     Imaging:   Personally Reviewed:  [ ] YES               Consultant(s) notes reviewed:    Care Discussed with Consultant(s)/Other Providers:    Advanced Directives: [ ] DNR  [ ] No feeding tube  [ ] MOLST in chart  [ ] MOLST completed today  [x ] Unknown

## 2021-08-31 NOTE — PROGRESS NOTE ADULT - SUBJECTIVE AND OBJECTIVE BOX
POD #1  Afeb 159/90 89 100%RA    Pt has no c/o  Abd- soft NT ND; + flatus +BM     wounds C&D  Rm 1371

## 2021-08-31 NOTE — DISCHARGE NOTE NURSING/CASE MANAGEMENT/SOCIAL WORK - NSDCPEFALRISK_GEN_ALL_CORE
For information on Fall & injury Prevention, visit [email or web address]

## 2021-09-01 ENCOUNTER — NON-APPOINTMENT (OUTPATIENT)
Age: 71
End: 2021-09-01

## 2021-09-02 LAB — HEMATOPATHOLOGY REPORT: SIGNIFICANT CHANGE UP

## 2021-09-08 ENCOUNTER — NON-APPOINTMENT (OUTPATIENT)
Age: 71
End: 2021-09-08

## 2021-09-08 ENCOUNTER — APPOINTMENT (OUTPATIENT)
Dept: ELECTROPHYSIOLOGY | Facility: CLINIC | Age: 71
End: 2021-09-08
Payer: MEDICARE

## 2021-09-08 LAB
ALBUMIN MFR SERPL ELPH: 66 %
ALBUMIN SERPL ELPH-MCNC: 4.4 G/DL
ALBUMIN SERPL-MCNC: 4.1 G/DL
ALBUMIN/GLOB SERPL: 2 RATIO
ALP BLD-CCNC: 90 U/L
ALPHA1 GLOB MFR SERPL ELPH: 4.6 %
ALPHA1 GLOB SERPL ELPH-MCNC: 0.3 G/DL
ALPHA2 GLOB MFR SERPL ELPH: 12.1 %
ALPHA2 GLOB SERPL ELPH-MCNC: 0.8 G/DL
ALT SERPL-CCNC: 23 U/L
ANION GAP SERPL CALC-SCNC: 9 MMOL/L
AST SERPL-CCNC: 24 U/L
B-GLOBULIN MFR SERPL ELPH: 9.2 %
B-GLOBULIN SERPL ELPH-MCNC: 0.6 G/DL
BILIRUB SERPL-MCNC: 0.4 MG/DL
BUN SERPL-MCNC: 26 MG/DL
CALCIUM SERPL-MCNC: 9.8 MG/DL
CHLORIDE SERPL-SCNC: 100 MMOL/L
CO2 SERPL-SCNC: 28 MMOL/L
CREAT SERPL-MCNC: 1.25 MG/DL
DEPRECATED KAPPA LC FREE/LAMBDA SER: 1.54 RATIO
GAMMA GLOB FLD ELPH-MCNC: 0.5 G/DL
GAMMA GLOB MFR SERPL ELPH: 8.1 %
GLUCOSE SERPL-MCNC: 102 MG/DL
IGA SER QL IEP: 39 MG/DL
IGG SER QL IEP: 581 MG/DL
IGM SER QL IEP: 108 MG/DL
INTERPRETATION SERPL IEP-IMP: NORMAL
KAPPA LC CSF-MCNC: 1.29 MG/DL
KAPPA LC SERPL-MCNC: 1.99 MG/DL
M PROTEIN SPEC IFE-MCNC: NORMAL
POTASSIUM SERPL-SCNC: 4.1 MMOL/L
PROT SERPL-MCNC: 6.2 G/DL
SODIUM SERPL-SCNC: 137 MMOL/L

## 2021-09-08 PROCEDURE — G2066: CPT

## 2021-09-08 PROCEDURE — 93298 REM INTERROG DEV EVAL SCRMS: CPT

## 2021-09-15 ENCOUNTER — OUTPATIENT (OUTPATIENT)
Dept: OUTPATIENT SERVICES | Facility: HOSPITAL | Age: 71
LOS: 1 days | Discharge: HOME | End: 2021-09-15

## 2021-09-15 DIAGNOSIS — D03.9 MELANOMA IN SITU, UNSPECIFIED: Chronic | ICD-10-CM

## 2021-09-15 DIAGNOSIS — D75.1 SECONDARY POLYCYTHEMIA: ICD-10-CM

## 2021-09-15 DIAGNOSIS — Z98.890 OTHER SPECIFIED POSTPROCEDURAL STATES: Chronic | ICD-10-CM

## 2021-09-15 DIAGNOSIS — Z90.49 ACQUIRED ABSENCE OF OTHER SPECIFIED PARTS OF DIGESTIVE TRACT: Chronic | ICD-10-CM

## 2021-09-17 ENCOUNTER — RESULT REVIEW (OUTPATIENT)
Age: 71
End: 2021-09-17

## 2021-09-17 ENCOUNTER — APPOINTMENT (OUTPATIENT)
Dept: UROLOGY | Facility: CLINIC | Age: 71
End: 2021-09-17
Payer: MEDICARE

## 2021-09-17 ENCOUNTER — APPOINTMENT (OUTPATIENT)
Dept: HEMATOLOGY ONCOLOGY | Facility: CLINIC | Age: 71
End: 2021-09-17
Payer: MEDICARE

## 2021-09-17 VITALS
RESPIRATION RATE: 17 BRPM | SYSTOLIC BLOOD PRESSURE: 131 MMHG | HEART RATE: 67 BPM | HEIGHT: 67 IN | OXYGEN SATURATION: 100 % | BODY MASS INDEX: 27.15 KG/M2 | WEIGHT: 173 LBS | DIASTOLIC BLOOD PRESSURE: 70 MMHG | TEMPERATURE: 97 F

## 2021-09-17 VITALS
OXYGEN SATURATION: 98 % | TEMPERATURE: 97.2 F | WEIGHT: 173.06 LBS | HEART RATE: 63 BPM | RESPIRATION RATE: 18 BRPM | SYSTOLIC BLOOD PRESSURE: 143 MMHG | BODY MASS INDEX: 26.53 KG/M2 | HEIGHT: 67.52 IN | DIASTOLIC BLOOD PRESSURE: 87 MMHG

## 2021-09-17 DIAGNOSIS — C83.00 SMALL CELL B-CELL LYMPHOMA, UNSPECIFIED SITE: ICD-10-CM

## 2021-09-17 DIAGNOSIS — D75.1 SECONDARY POLYCYTHEMIA: ICD-10-CM

## 2021-09-17 LAB
BASOPHILS # BLD AUTO: 0.08 K/UL — SIGNIFICANT CHANGE UP (ref 0–0.2)
BASOPHILS NFR BLD AUTO: 1 % — SIGNIFICANT CHANGE UP (ref 0–2)
CHROM ANALY OVERALL INTERP SPEC-IMP: SIGNIFICANT CHANGE UP
EOSINOPHIL # BLD AUTO: 0.59 K/UL — HIGH (ref 0–0.5)
EOSINOPHIL NFR BLD AUTO: 7 % — HIGH (ref 0–6)
HCT VFR BLD CALC: 42.3 % — SIGNIFICANT CHANGE UP (ref 39–50)
HGB BLD-MCNC: 14.7 G/DL — SIGNIFICANT CHANGE UP (ref 13–17)
LYMPHOCYTES # BLD AUTO: 3.31 K/UL — HIGH (ref 1–3.3)
LYMPHOCYTES # BLD AUTO: 39 % — SIGNIFICANT CHANGE UP (ref 13–44)
MCHC RBC-ENTMCNC: 29.9 PG — SIGNIFICANT CHANGE UP (ref 27–34)
MCHC RBC-ENTMCNC: 34.8 G/DL — SIGNIFICANT CHANGE UP (ref 32–36)
MCV RBC AUTO: 86.2 FL — SIGNIFICANT CHANGE UP (ref 80–100)
MONOCYTES # BLD AUTO: 0.59 K/UL — SIGNIFICANT CHANGE UP (ref 0–0.9)
MONOCYTES NFR BLD AUTO: 7 % — SIGNIFICANT CHANGE UP (ref 2–14)
NEUTROPHILS # BLD AUTO: 3.91 K/UL — SIGNIFICANT CHANGE UP (ref 1.8–7.4)
NEUTROPHILS NFR BLD AUTO: 46 % — SIGNIFICANT CHANGE UP (ref 43–77)
NRBC # BLD: 0 /100 — SIGNIFICANT CHANGE UP (ref 0–0)
NRBC # BLD: SIGNIFICANT CHANGE UP /100 WBCS (ref 0–0)
PLAT MORPH BLD: NORMAL — SIGNIFICANT CHANGE UP
PLATELET # BLD AUTO: 201 K/UL — SIGNIFICANT CHANGE UP (ref 150–400)
RBC # BLD: 4.91 M/UL — SIGNIFICANT CHANGE UP (ref 4.2–5.8)
RBC # FLD: 13.9 % — SIGNIFICANT CHANGE UP (ref 10.3–14.5)
RBC BLD AUTO: SIGNIFICANT CHANGE UP
SMUDGE CELLS # BLD: PRESENT — SIGNIFICANT CHANGE UP
WBC # BLD: 8.49 K/UL — SIGNIFICANT CHANGE UP (ref 3.8–10.5)
WBC # FLD AUTO: 8.49 K/UL — SIGNIFICANT CHANGE UP (ref 3.8–10.5)

## 2021-09-17 PROCEDURE — 99024 POSTOP FOLLOW-UP VISIT: CPT

## 2021-09-17 PROCEDURE — 99214 OFFICE O/P EST MOD 30 MIN: CPT

## 2021-09-17 NOTE — HISTORY OF PRESENT ILLNESS
[de-identified] : 68 y/o M with longstanding history of hypertension, with aortic dissection in 2016 with prolonged hospital course, was very critically ill at the time, now with persistent fatigue and exercise intolerance referred to me for polycythemia on lab work. Patient mentions that he was initially under the impression that it was because he was dehydrated, but his doctor became concerned when his electrolytes didn't support that. He reports he remembers being told in the past it was slightly elevated but fluctuated to normal. No flushing or pruritus after warm shower. Denies any chest pains or dyspnea at rest, only has dyspnea on exertion ever since his incident with the aortic dissection. No thrombosis in the past. No family history of hematologic disorders. Patient is a non-smoker, wife reports he does not snore and he has no trouble sleeping, no frequent night-time awakening. \par \par Patient erythropoietin level at 13.1, Oxygen p50 normal. Hemoglobin normalized without intervention. Patient was also found with elevated kappa free light chain with an elevated ratio at very low level, no other signs of monoclonal gammopathy. This ratio uptrending slightly, so following q6months at this point. [de-identified] : s/p Renal mass excision with excision of two para-aortic lymph modes which pathology showed findings consistent with small lymphocytic lymphoma. Patient has gained back some of his weight since the surgery. He reports no pain symptoms. He denies any night sweats, and his appetite is largely normal although he does say he eats less than he used to. He reports no fevers and chills, and reports he has not had any infections recently. Otherwise feels pretty well currently.

## 2021-09-17 NOTE — RESULTS/DATA
[FreeTextEntry1] : From 8/30/2021\par 1. Lymph nodes, para-aortic, excision\par - Small lymphocytic lymphoma/chronic lymphocytic leukemia.\par - In situ follicular neoplasia/follicular lymphoma, grade 1-\par 2 (minor component).\par - See note.\par \par 2. Lymph nodes, additional para-aortic, excision\par - Small lymphocytic lymphoma/chronic lymphocytic leukemia.\par - In situ follicular neoplasia/follicular lymphoma, grade 1-\par 2 (minor component).\par - See note.\par \par Note:\par The histologic and immunophenotypic findings are consistent with\par small lymphocytic lymphoma/chronic lymphocytic leukemia.  In\par addition, there are few follicles with co-expression CD10, BCL-6\par and BCL-2 and some of these cells are also seen scattered in\par interfollicular areas.  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  Proliferation centers are present. Rare germinal center-\par like structures are present, some consist of predominantly\par centrocytes..\par \par Immunohistochemical stains (CD3, CD5, CD10, CD20, CD21, CD23,\par CD43, CD79a, BCL-2, BCL-6, cyclinD1, ki-67, MUM-1, PAX-5) were\par performed on block 1B.  The neoplastic  cells are positive for\par CD20, CD79a, PAX-5, CD5, CD23, CD43, BCL-2; negative CD10, BCL-6,\par cyclinD1.  The proliferation centers show some MUM-1 positive\par cells.  There are several follicles which are positive for CD10,\par CD20, CD79a, PAX-5, BCL-6, BCL-2; negative CD5, CD43.  There are\par some scattered B-cells of follicular center origin seen in the\par interfollicular areas.  CD21 and CD23 highlight follicular\par dendritic meshwork.  There are scattered T cells (CD3+CD5+) in\par the background.\par \par Flow cytometry shows monotypic B-cells (positive for kappa, CD19,\par dim CD20, CD23, CD5; negative FMC-7, CD10) consistent with small lymphocytic lymphoma/chronic\par lymphocytic leukemia.  A subset of the CD5+ B cells is positive for lambda.\par \par \par \par \par \par EXAM:  MR ANGIO CHEST NOT CARD IC\par PROCEDURE DATE:  07/29/2021\par \par \par \par FINDINGS:\par \par HEART AND VASCULATURE: Normal heart size without pericardial effusion.\par \par There is a left sided aortic arch with bovine branching pattern of the great vessels. No dissection or intramural hematoma. Status post ascending aortic replacement.\par \par Thoracic aorta  luminal measurements:\par \par *  Aortic root: 4 cm (sinus-to-commissure), unchanged\par *  Mid ascending aorta at the level of the right pulmonary artery: 3 cm, unchanged\par *  Transverse arch: 4.8 cm, previously 4.7 cm\par *  Mid descending at the level of the left superior pulmonary vein: 3.3 cm, unchanged\par *  Diaphragm level: 2.8 cm, unchanged\par \par LUNGS, PLEURA:  No lung mass or consolidation. No pleural effusion.\par \par MEDIASTINUM, LYMPH NODES: No lymphadenopathy.\par \par UPPER ABDOMEN: Within normal limits.\par \par BONES AND SOFT TISSUES: Within normal limits.\par \par LOWER NECK: Within normal limits.\par \par IMPRESSION:\par \par The aortic arch measures up to 4.8 cm, previously 4.7 cm.\par \par \par \par \par  \par \par \par EXAM:  MR ANGIO ABDOMEN WAW IC\par PROCEDURE DATE:  07/27/2021\par \par FINDINGS:\par LIVER: Normal in size. Subcentimeter right hepatic cyst is stable.\par BILE DUCTS: Normal caliber.\par GALLBLADDER: Cholecystectomy.\par SPLEEN: Within normal limits.\par PANCREAS: Numerous small pancreatic cysts are stable, largest measuring 9 mm in the pancreatic tail. No main pancreatic duct dilatation\par ADRENALS: Mild nodular thickening of left adrenal unchanged since 6/22/2017\par KIDNEYS/URETERS: A 1.6 x 1.5 cm left anterior mid renal cyst demonstrates internal solid enhancement, concerning for neoplasm. Small bilateral renal cysts. No hydronephrosis.\par \par VISUALIZED PORTIONS:\par BOWEL: Within normal limits.\par PERITONEUM: No ascites.\par VESSELS: Tortuous, ectatic abdominal aorta without evidence of dissection or aneurysm. The celiac artery, SMA, MITZY and bilateral renal arteries, including an accessory right renal artery, are patent. The bilateral common, external and internal iliac arteries are also patent. The renal veins and IVC are patent.\par RETROPERITONEUM/LYMPH NODES: New retroperitoneal adenopathy with reference nodes as follows:\par Para-aortic node measuring 2.2 x 1.4 cm (23, 42).\par Left common iliac lymph node measures 1.9 x 1.3 cm (23, 62).\par ABDOMINAL WALL: Within normal limits.\par \par IMPRESSION:\par No evidence of abdominal aortic aneurysm or dissection.\par \par Note made of a complex 1.6 cm left mid renal cyst with internal nodular enhancement, highly suspicious for neoplasm.\par \par New retroperitoneal adenopathy.\par

## 2021-09-17 NOTE — ASSESSMENT
[FreeTextEntry1] : 68 y/o M with history of aortic dissection in 2016, longstanding history of HTN, and peripheral neuropathy in feet ever since his aortic dissection surgery here for follow up of polycythemia, now s/psurgery for renal mass removal and lymph node excisional biopsy.\par \par -Pathology from lymph node biopsy consistent with involvement of small lymphocytic lymphoma. Renal mass pathology c/w RCC.\par -Patient only with a transient lymphocytosis in the past however normal now (<5000) so would not qualify for diagnosis of CLL. \par -MRA of chest did not reveal any further evidence of lymphomatous process, likely limited to the abdomen. \par -Hemoglobin/hematocrit completely normalized at this point, and workup was unremarkable. \par -Although RCC can be associated with a secondary polycythemia, this self resolved and erythropoietin was not elevated so likely mostly a coincidence. \par -Continue close cardiology follow up for aneurysm / elevated BP. \par -Elevated free light chains - likely related to the SLL. Will continue to follow however likely inconsequential. Patient also with hypogammaglobulinemia. No increase in infectious complications at this time, immunoglobulin levels low but not critical. Will hold off on IVIG at this time. Discussed this with patient and wife. \par -For now, given early stage SLL technically would be a candidate for XRT, however given the abdominal location and the lack of symptoms will forgo this treatment in favor of observation. Discussed the indications for treatment, including progressive cytopenias, progressive constitutional symptoms, or end organ damage. \par -Will repeat CT Chest/Abdomen/Pelvis in 3 months\par -Patient understands and agrees with plan. All information explained to the best of my ability.\par -RTC in 3 months\par

## 2021-09-18 NOTE — HISTORY OF PRESENT ILLNESS
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 Nocturia once. FOS ok with no hesitancy, intermittency or straining. No dysuria, hematuria or UTIs. no retention.\par Has ED following his aneurysm surgery - not interested in medications.\par PSA 3.1 . \par \par DUE to COVID opted fopr TEB. No change in LIUTs - nothing new. no dysuria or hematuria. \par \par here today with finding of a left renal mass noted of recent CT scan performed as f/u for AAA\par reviewed this scan and one from last year with patient and wife: notes a 1.6cm mixed cystic solid lesion anterior surface of right kidney; noted lesion present last year at 1cm and more systic then. Also note 2 enlarged LNs one 2cm in the left hilum \par \par now s/p Lap partial Nephrectomy and LN removal\par doing well with no issues

## 2021-09-23 NOTE — H&P PST ADULT - NS MD HP PRESSURE ULCER DRAIN
Validate Note Data (See Information Below): No Electrodesiccation Text: The wound bed was treated with electrodesiccation after the biopsy was performed. Detail Level: Simple Size Of Lesion In Cm: 1 Anesthesia Volume In Cc: 0.5 Additional Anesthesia Volume In Cc (Will Not Render If 0): 0 Hemostasis: Aluminum Chloride Biopsy Type: H and E Information: Selecting Yes will display possible errors in your note based on the variables you have selected. This validation is only offered as a suggestion for you. PLEASE NOTE THAT THE VALIDATION TEXT WILL BE REMOVED WHEN YOU FINALIZE YOUR NOTE. IF YOU WANT TO FAX A PRELIMINARY NOTE YOU WILL NEED TO TOGGLE THIS TO 'NO' IF YOU DO NOT WANT IT IN YOUR FAXED NOTE. no Billing Type: Third-Party Bill Dressing: pressure dressing with telfa Consent was obtained and risks were reviewed including but not limited to scarring, infection, bleeding, scabbing, incomplete removal, nerve damage and allergy to anesthesia. Post-Care Instructions: I reviewed with the patient in detail post-care instructions. Patient is to keep the biopsy site dry overnight, wash with soap and water and then apply ointment daily healed. Render Post-Care Instructions In Note?: yes Notification Instructions: Patient will be notified of biopsy results. However, patient instructed to call the office if not contacted within 2 weeks. Electrodesiccation And Curettage Text: The wound bed was treated with electrodesiccation and curettage after the biopsy was performed. Anesthesia Type: 0.5% lidocaine with 1:200,000 epinephrine and a 1:10 solution of 8.4% sodium bicarbonate Curettage Text: The wound bed was treated with curettage after the biopsy was performed. Body Location Override (Optional - Billing Will Still Be Based On Selected Body Map Location If Applicable): midline upper forehead Silver Nitrate Text: The wound bed was treated with silver nitrate after the biopsy was performed. Biopsy Method: double edge Personna blade Depth Of Biopsy: dermis Cryotherapy Text: The wound bed was treated with cryotherapy after the biopsy was performed. Wound Care: Bacitracin Type Of Destruction Used: Curettage

## 2021-09-30 ENCOUNTER — APPOINTMENT (OUTPATIENT)
Dept: ELECTROPHYSIOLOGY | Facility: CLINIC | Age: 71
End: 2021-09-30
Payer: MEDICARE

## 2021-09-30 PROCEDURE — 93285 PRGRMG DEV EVAL SCRMS IP: CPT

## 2021-09-30 RX ORDER — LEVOCETIRIZINE DIHYDROCHLORIDE 5 MG/1
5 TABLET ORAL
Qty: 90 | Refills: 1 | Status: DISCONTINUED | COMMUNITY
Start: 2020-06-09 | End: 2021-09-30

## 2021-09-30 RX ORDER — FLUTICASONE PROPIONATE 50 MCG
50 SPRAY, SUSPENSION NASAL
Refills: 0 | Status: ACTIVE | COMMUNITY

## 2021-09-30 RX ORDER — CETIRIZINE HYDROCHLORIDE 10 MG/1
10 CAPSULE, LIQUID FILLED ORAL
Refills: 0 | Status: ACTIVE | COMMUNITY

## 2021-10-05 ENCOUNTER — EMERGENCY (EMERGENCY)
Facility: HOSPITAL | Age: 71
LOS: 1 days | Discharge: HOME | End: 2021-10-05
Attending: EMERGENCY MEDICINE | Admitting: EMERGENCY MEDICINE
Payer: MEDICARE

## 2021-10-05 VITALS
SYSTOLIC BLOOD PRESSURE: 150 MMHG | RESPIRATION RATE: 18 BRPM | OXYGEN SATURATION: 98 % | HEART RATE: 68 BPM | DIASTOLIC BLOOD PRESSURE: 83 MMHG | TEMPERATURE: 98 F | HEIGHT: 67.5 IN

## 2021-10-05 DIAGNOSIS — D03.9 MELANOMA IN SITU, UNSPECIFIED: Chronic | ICD-10-CM

## 2021-10-05 DIAGNOSIS — Z98.890 OTHER SPECIFIED POSTPROCEDURAL STATES: Chronic | ICD-10-CM

## 2021-10-05 DIAGNOSIS — Z90.49 ACQUIRED ABSENCE OF OTHER SPECIFIED PARTS OF DIGESTIVE TRACT: Chronic | ICD-10-CM

## 2021-10-05 PROCEDURE — 99284 EMERGENCY DEPT VISIT MOD MDM: CPT | Mod: GC

## 2021-10-05 RX ORDER — SODIUM CHLORIDE 9 MG/ML
1000 INJECTION INTRAMUSCULAR; INTRAVENOUS; SUBCUTANEOUS ONCE
Refills: 0 | Status: COMPLETED | OUTPATIENT
Start: 2021-10-05 | End: 2021-10-05

## 2021-10-05 NOTE — ED ADULT NURSE NOTE - NS ED PATIENT SAFETY CONCERN
LMTCB  
LVM x2, advised pt if she is still breastfeeding or weaning, that could lead to spotting or irregular VB, but to call back if she still has questions or wants to schedule an appt.  
Nanette called to schedule an AE for today, which has not been scheduled yet. She delivered about 3 months ago & is still having spotting. She would like to discuss if this is normal.  
No

## 2021-10-05 NOTE — ED ADULT NURSE NOTE - OBJECTIVE STATEMENT
Received patient to room 1 for diarrhea. A&o4. ambulatory, pt stating felt like he had a lot of gas today and took a stool softener, has been going nonstop since, "leaking out of me" with some blood.  Appears comfortable, RR even and unlabored.  Pending MD stone Received patient to room 1 for diarrhea. A&o4, ambulatory, pt stating felt like he had a lot of gas today and took a stool softener, has been going nonstop since, "leaking out of me" with some blood.  S/p LT partial nephrectomy one month ago c/o of abd pain. Appears comfortable, RR even and unlabored. Pt cleaned and repositioned in bed, left 20G AC placed, fluids running per MD orders.

## 2021-10-05 NOTE — ED ADULT TRIAGE NOTE - CHIEF COMPLAINT QUOTE
p/t with CLL , s/p LT partial nephrectomy one month ago c/o of abd pain and rectal bleed since this afternoon, p/t denies any chest pain denies sob

## 2021-10-06 VITALS
RESPIRATION RATE: 12 BRPM | TEMPERATURE: 98 F | OXYGEN SATURATION: 98 % | SYSTOLIC BLOOD PRESSURE: 129 MMHG | HEART RATE: 94 BPM | DIASTOLIC BLOOD PRESSURE: 84 MMHG

## 2021-10-06 LAB
ALBUMIN SERPL ELPH-MCNC: 4.4 G/DL — SIGNIFICANT CHANGE UP (ref 3.3–5)
ALP SERPL-CCNC: 113 U/L — SIGNIFICANT CHANGE UP (ref 40–120)
ALT FLD-CCNC: 26 U/L — SIGNIFICANT CHANGE UP (ref 4–41)
ANION GAP SERPL CALC-SCNC: 17 MMOL/L — HIGH (ref 7–14)
APTT BLD: 41.2 SEC — HIGH (ref 27–36.3)
AST SERPL-CCNC: 25 U/L — SIGNIFICANT CHANGE UP (ref 4–40)
BASOPHILS # BLD AUTO: 0.04 K/UL — SIGNIFICANT CHANGE UP (ref 0–0.2)
BASOPHILS NFR BLD AUTO: 0.2 % — SIGNIFICANT CHANGE UP (ref 0–2)
BILIRUB SERPL-MCNC: 1.2 MG/DL — SIGNIFICANT CHANGE UP (ref 0.2–1.2)
BLD GP AB SCN SERPL QL: NEGATIVE — SIGNIFICANT CHANGE UP
BLOOD GAS VENOUS COMPREHENSIVE RESULT: SIGNIFICANT CHANGE UP
BUN SERPL-MCNC: 28 MG/DL — HIGH (ref 7–23)
CALCIUM SERPL-MCNC: 10.3 MG/DL — SIGNIFICANT CHANGE UP (ref 8.4–10.5)
CHLORIDE SERPL-SCNC: 98 MMOL/L — SIGNIFICANT CHANGE UP (ref 98–107)
CO2 SERPL-SCNC: 22 MMOL/L — SIGNIFICANT CHANGE UP (ref 22–31)
CREAT SERPL-MCNC: 0.97 MG/DL — SIGNIFICANT CHANGE UP (ref 0.5–1.3)
EOSINOPHIL # BLD AUTO: 0.04 K/UL — SIGNIFICANT CHANGE UP (ref 0–0.5)
EOSINOPHIL NFR BLD AUTO: 0.2 % — SIGNIFICANT CHANGE UP (ref 0–6)
GLUCOSE SERPL-MCNC: 132 MG/DL — HIGH (ref 70–99)
HCT VFR BLD CALC: 46.4 % — SIGNIFICANT CHANGE UP (ref 39–50)
HGB BLD-MCNC: 16.1 G/DL — SIGNIFICANT CHANGE UP (ref 13–17)
IANC: 13.93 K/UL — HIGH (ref 1.5–8.5)
IMM GRANULOCYTES NFR BLD AUTO: 0.2 % — SIGNIFICANT CHANGE UP (ref 0–1.5)
INR BLD: 1.09 RATIO — SIGNIFICANT CHANGE UP (ref 0.88–1.16)
LYMPHOCYTES # BLD AUTO: 1.21 K/UL — SIGNIFICANT CHANGE UP (ref 1–3.3)
LYMPHOCYTES # BLD AUTO: 7.3 % — LOW (ref 13–44)
MCHC RBC-ENTMCNC: 29.8 PG — SIGNIFICANT CHANGE UP (ref 27–34)
MCHC RBC-ENTMCNC: 34.7 GM/DL — SIGNIFICANT CHANGE UP (ref 32–36)
MCV RBC AUTO: 85.8 FL — SIGNIFICANT CHANGE UP (ref 80–100)
MONOCYTES # BLD AUTO: 1.25 K/UL — HIGH (ref 0–0.9)
MONOCYTES NFR BLD AUTO: 7.6 % — SIGNIFICANT CHANGE UP (ref 2–14)
NEUTROPHILS # BLD AUTO: 13.93 K/UL — HIGH (ref 1.8–7.4)
NEUTROPHILS NFR BLD AUTO: 84.5 % — HIGH (ref 43–77)
NRBC # BLD: 0 /100 WBCS — SIGNIFICANT CHANGE UP
NRBC # FLD: 0 K/UL — SIGNIFICANT CHANGE UP
PLATELET # BLD AUTO: 135 K/UL — LOW (ref 150–400)
POTASSIUM SERPL-MCNC: 3.7 MMOL/L — SIGNIFICANT CHANGE UP (ref 3.5–5.3)
POTASSIUM SERPL-SCNC: 3.7 MMOL/L — SIGNIFICANT CHANGE UP (ref 3.5–5.3)
PROT SERPL-MCNC: 6.5 G/DL — SIGNIFICANT CHANGE UP (ref 6–8.3)
PROTHROM AB SERPL-ACNC: 12.5 SEC — SIGNIFICANT CHANGE UP (ref 10.6–13.6)
RBC # BLD: 5.41 M/UL — SIGNIFICANT CHANGE UP (ref 4.2–5.8)
RBC # FLD: 13.6 % — SIGNIFICANT CHANGE UP (ref 10.3–14.5)
RH IG SCN BLD-IMP: POSITIVE — SIGNIFICANT CHANGE UP
SODIUM SERPL-SCNC: 137 MMOL/L — SIGNIFICANT CHANGE UP (ref 135–145)
WBC # BLD: 16.51 K/UL — HIGH (ref 3.8–10.5)
WBC # FLD AUTO: 16.51 K/UL — HIGH (ref 3.8–10.5)

## 2021-10-06 PROCEDURE — 74177 CT ABD & PELVIS W/CONTRAST: CPT | Mod: 26

## 2021-10-06 RX ADMIN — SODIUM CHLORIDE 1000 MILLILITER(S): 9 INJECTION INTRAMUSCULAR; INTRAVENOUS; SUBCUTANEOUS at 00:17

## 2021-10-06 NOTE — ED PROVIDER NOTE - PHYSICAL EXAMINATION
GEN - NAD; non-toxic; A+Ox3, speaking full sentences, steady gait   HENT - NC/AT, No visible Ecchymosis, No Abrasions, No Lac/Tears, MMM, no discharge  EYES - EOMI, (-) conjunctival pallor, no scleral icterus  NECK - Neck supple, No LAD, No Swelling  PULM - CTA B/L,  symmetric breath sounds  CV -  RRR, S1 S2, no murmurs 2+ Pulses B/L UE  GI - (+) Abd Distension - without any focal TTP; no guarding, no rebound, no masses     - Chaperone (MD Troy) - Stool in Underwear, No gross blood. No Hemorrhoids.   MSK/EXT- no edema, no gross deformity, warm and well perfused, no calf tenderness/swelling/erythema   SKIN - (+) Abdominal Incision and L Flank Incision - CDI without any purulent discharge, erythema, bullae, or crepitus  NEUROLOGIC - alert, moving all 4 ext with 5/5 Strength

## 2021-10-06 NOTE — ED PROVIDER NOTE - NS ED ROS FT
Constitution: No Fever or chills, No Weight Loss,   Eyes: No visual changes  HEENT: No cough, No Discharge, No Rhinorrhea, No URI symptoms  Cardio: No Chest pain, No Palpitations, No Dyspnea  Resp: No SOB, No Wheezing  GI: (+) abdominal pain, No Nausea, No Vomiting, (+) Constipation; (?) Rectal Bleed  : No burning upon urination, trouble urinating, no foul odor from urine  MSK: No Back pain, No Numbness, No Tingling, No Weakness  Neuro: No Headache, Normal Gait  Skin: No rashes, No Bruising, No Swelling

## 2021-10-06 NOTE — ED PROVIDER NOTE - PATIENT PORTAL LINK FT
You can access the FollowMyHealth Patient Portal offered by [facility] by registering at the following website: [email or web address]. By joining [employer]’s FollowMyHealth portal, you will also be able to view your health information using other applications (apps) compatible with our system.

## 2021-10-06 NOTE — ED PROVIDER NOTE - CLINICAL SUMMARY MEDICAL DECISION MAKING FREE TEXT BOX
71 male, Hx: CLL (not currently on treatment), Renal Tumor (s/p Partial L Nephrectomy MD Florin), HTN, HLD - presents with cc: constipation and abdominal pain, and questionable BRBPR. Exam, presentation, and history concerning for obstipation and distension iso of SBO vs. Colitis vs. Post-op infection. Eval is NOT consistent with rectal bleeding (no blood in vault, or stool seen on exam), AAA (2+ Pulses B/L UE and LE; non-toxic appearance, VSS). Plan: CBC, CMP, EKG, CTAP w/IV Contrast, Coags, T&S, IV Fluids. Re-wade.

## 2021-10-06 NOTE — ED PROVIDER NOTE - NSICDXPASTSURGICALHX_GEN_ALL_CORE_FT
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

## 2021-10-06 NOTE — ED PROVIDER NOTE - NSFOLLOWUPINSTRUCTIONS_ED_ALL_ED_FT
You were seen and evaluated in the Emergency Department for your constipation. You were evaluated clinically and with laboratory studies.    - TAKE THE ANTIBIOTICS WE PRESCRIBED TO YOU, AS DIRECTED    At this time your clinical evaluation and history do not demonstrate any acute, life-threatening medical conditions warranting emergent treatment. However, we strongly recommend you follow up with one of our GI consultants (or your own) for further evaluation of your symptoms by calling the following number to make an appointment:    [facility]  Gastroenterology  [address] [place] [address], [address]  [place] [address]  Phone: [phone]    Should you develop new or worsening abdominal pain, fevers, chills, nausea, vomiting, diarrhea, or constipation - please return to the ED for immediate evaluation.     We also strongly encourage you make an appointment with your Primary Care Physician for a comprehensive evaluation of your health.

## 2021-10-06 NOTE — ED PROVIDER NOTE - OBJECTIVE STATEMENT
71 male, Hx: CLL (not currently on treatment), Renal Tumor (s/p Partial L Nephrectomy MD Florin), HTN, HLD - presents with cc: constipation and abdominal pain. Pt reports he has not been able to pass any stool earlier today, was on the toilet trying to push when he started to develop diffuse abdominal pain. Ended up taking a laxative from which he was unable to pass any formed stool but started to have rectal leaking of loose stool. Wife saw questionable dark blood vs. stool. Denies any fevers, chills, cough, CP, SOB, palpitations, dizziness, fatigue, nausea, vomiting, dysuric symptoms. Denies any A/C use. Denies any prior history of GI bleed. Last colonoscopy years prior - without any acute endorsed abnormalities. 71 male, Hx: CLL (not currently on treatment), Renal Tumor (s/p Partial L Nephrectomy MD Florin), HTN, HLD - presents with cc: constipation and abdominal pain. Pt reports he has not been able to pass any stool earlier today, was on the toilet trying to push when he started to develop diffuse abdominal pain. Ended up taking a laxative from which he was unable to pass any formed stool but started to have rectal leaking of loose stool. Wife saw questionable dark blood vs. stool. Denies any fevers, chills, cough, CP, SOB, palpitations, dizziness, fatigue, nausea, vomiting, dysuric symptoms. Denies any A/C use. Denies any prior history of GI bleed. Last colonoscopy years prior - without any acute endorsed abnormalities.    Attendinyo male presents with abdominal distention, constipation and nausea.  symptoms started earlier today.  pt thought he had blood in the stool earlier today.  no bleeding now.

## 2021-10-06 NOTE — ED PROVIDER NOTE - PROGRESS NOTE DETAILS
Resident Gisell: pt re-evaluated clinically, comfortable on re-exam. Preliminary evaluation without any acute, actionable pathology - sterocoralcolitis, s/p disempaction. Pt is tolerating PO intake, without any nausea/vomiting, with stable gait - will discharge home with followup care. Resident Gisell: pt re-evaluated clinically, comfortable on re-exam. Preliminary evaluation without any acute, actionable pathology - sterocoralcolitis, s/p disimpaction. Pt is tolerating PO intake, without any nausea/vomiting, with stable gait - will discharge home with followup care.

## 2021-10-15 ENCOUNTER — RX RENEWAL (OUTPATIENT)
Age: 71
End: 2021-10-15

## 2021-10-19 ENCOUNTER — APPOINTMENT (OUTPATIENT)
Dept: CARDIOTHORACIC SURGERY | Facility: CLINIC | Age: 71
End: 2021-10-19
Payer: MEDICARE

## 2021-10-19 PROCEDURE — 99213 OFFICE O/P EST LOW 20 MIN: CPT | Mod: 95

## 2021-10-20 ENCOUNTER — APPOINTMENT (OUTPATIENT)
Dept: CARDIOTHORACIC SURGERY | Facility: CLINIC | Age: 71
End: 2021-10-20
Payer: MEDICARE

## 2021-10-27 ENCOUNTER — NON-APPOINTMENT (OUTPATIENT)
Age: 71
End: 2021-10-27

## 2021-10-27 ENCOUNTER — APPOINTMENT (OUTPATIENT)
Dept: ELECTROPHYSIOLOGY | Facility: CLINIC | Age: 71
End: 2021-10-27
Payer: MEDICARE

## 2021-10-27 PROCEDURE — G2066: CPT

## 2021-10-27 PROCEDURE — 93298 REM INTERROG DEV EVAL SCRMS: CPT | Mod: NC

## 2021-11-02 NOTE — ED ADULT TRIAGE NOTE - NS ED NURSE BANDS TYPE
Chief Complaint  Follow-up (F/U Right knee OA)      History of Present Illness:  Ran Epps is a pleasant 46 y.o. male who presents today for follow up evaluation of right knee pain. He has known osteoarthritis. He has history of right knee arthroscopy, partial medial lateral meniscectomies with removal of loose bodies 10/24/2019. He received a steroid injection on 5/19/2021 which provided relief for several months. He presents today with a flare up of pain medially and posteriorly over the last month exacerbated with sitting for extended periods of time and walking. No new injuries reported. Medical History:  Patient's medications, allergies, past medical, surgical, social and family histories were reviewed and updated as appropriate. Pertinent items are noted in HPI  Review of systems reviewed from Patient History Form completed today and available in the patient's chart under the Media tab.          Pain Assessment  Location of Pain: Knee  Location Modifiers: Right  Severity of Pain: 1  Quality of Pain: Aching, Sharp  Duration of Pain: Persistent  Frequency of Pain: Intermittent  Aggravating Factors: Standing, Walking  Limiting Behavior: No  Relieving Factors: Rest, Ice  Result of Injury: No  Work-Related Injury: No  Are there other pain locations you wish to document?: No    Past Medical History:   Diagnosis Date    Dental crown present     Displacement of lumbar intervertebral disc     Hyperlipidemia     Knee injuries         Past Surgical History:   Procedure Laterality Date    ACHILLES TENDON SURGERY      APPENDECTOMY      BACK SURGERY       lum bains l4-5 12/08,,lum bains l5-s1 9/09    COLONOSCOPY N/A 5/10/2019    COLONOSCOPY WITH BIOPSY performed by Truman Boogie MD at [address] [place] [address]  5/10/2019    COLONOSCOPY POLYPECTOMY ABLATION performed by Truman Boogie MD at [address]      right hand    KNEE ARTHROSCOPY      right    KNEE ARTHROSCOPY Right 10/24/2019 RIGHT KNEE ARTHROSCOPY, MEDIAL AND LATERAL MENISCUS EXCISION, SYNOVECTOMY, CHONDROPLASTY, AND REMOVAL OF LOOSE BODY performed by Emy La MD at [facility]  3/29/10    right mini-open posterior, L4-5, with screw fixation        Family History   Problem Relation Age of Onset    High Blood Pressure Mother     High Blood Pressure Father     Cancer Father        Social History     Socioeconomic History    Marital status:      Spouse name: None    Number of children: None    Years of education: None    Highest education level: None   Occupational History    None   Tobacco Use    Smoking status: Never Smoker    Smokeless tobacco: Never Used   Vaping Use    Vaping Use: Never used   Substance and Sexual Activity    Alcohol use: Yes     Alcohol/week: 3.0 standard drinks     Types: 3 Cans of beer per week     Comment: socially    Drug use: No    Sexual activity: Yes     Partners: Female   Other Topics Concern    None   Social History Narrative    None     Social Determinants of Health     Financial Resource Strain:     Difficulty of Paying Living Expenses:    Food Insecurity:     Worried About Running Out of Food in the Last Year:     Ran Out of Food in the Last Year:    Transportation Needs:     Lack of Transportation (Medical):      Lack of Transportation (Non-Medical):    Physical Activity:     Days of Exercise per Week:     Minutes of Exercise per Session:    Stress:     Feeling of Stress :    Social Connections:     Frequency of Communication with Friends and Family:     Frequency of Social Gatherings with Friends and Family:     Attends [employer] Services:     Active Member of Clubs or Organizations:     Attends Club or Organization Meetings:     Marital Status:    Intimate Partner Violence:     Fear of Current or Ex-Partner:     Emotionally Abused:     Physically Abused:     Sexually Abused:        Current Outpatient Medications   Medication Sig Dispense Refill    meloxicam (MOBIC) 15 MG tablet Take 1 tablet by mouth daily 30 tablet 3    meloxicam (MOBIC) 15 MG tablet Take 1 tablet by mouth daily 30 tablet 0    diclofenac (VOLTAREN) 75 MG EC tablet Take 1 tablet by mouth 2 times daily 1 PO Q BID WITH FOOD 60 tablet 0    diclofenac (VOLTAREN) 75 MG EC tablet Take 1 tablet by mouth 2 times daily 1 PO Q BID WITH FOOD 60 tablet 0    aspirin 325 MG EC tablet Take 1 tablet by mouth daily for 21 days 21 tablet 0    diclofenac (VOLTAREN) 75 MG EC tablet Take 1 tablet by mouth 2 times daily 60 tablet 0    atorvastatin (LIPITOR) 10 MG tablet Take 10 mg by mouth daily       No current facility-administered medications for this visit. Allergies   Allergen Reactions    Penicillins Shortness Of Breath       Vital signs:  Temp 98.2 °F (36.8 °C)   Ht 6' 2\" (1.88 m)   Wt 260 lb (117.9 kg)   BMI 33.38 kg/m²              RIGHT Knee Exam:        Gait/Alignment: Varus alignment                            Patella tracking: Normal      Inspection/Skin: No rashes are identified.     Effusion: Small effusion. Popliteal swelling.     Palpation: Medial joint line tenderness. Mild crepitus.     Range of Motion: Full extension and 120° of flexion     Strength: Mild quadriceps weakness.     Ligamentous Stability: Pseudo-laxity is present medially. Anterior and posterior cruciate ligaments were intact. Lateral collateral ligament is intact.     Neurologic and vascular: Skin is warm dry and well perfused. Sensation is intact to light touch over the knee.     Additional findings: Calf soft nontender. No patellar instability. LEFT Knee Exam:    Gait: No use of assistive devices. No antalgic gait. Alignment: normal alignment. Inspection/skin: Skin is intact without erythema or ecchymosis. No gross deformity. Palpation: no crepitus. no joint line tenderness present. Range of Motion: There is full range of motion. Strength: Normal quadriceps development. Effusion: No effusion or swelling present. Ligamentous stability: No cruciate or collateral ligament instability. Neurologic and vascular: Skin is warm and well-perfused. Sensation is intact to light-touch. Special tests: Negative Jean sign. Radiology:     Pertinent imaging was interpreted and reviewed with the patient. Radiographs were obtained, interpreted and reviewed with the patient in the office; 4 views: bilateral PA, bilateral [place], bilateral [employer] AND right lateral    Impression: Increased medial joint space narrowing         Assessment :  Osteoarthritis of right knee    Impression:  Encounter Diagnosis   Name Primary?  Right knee pain, unspecified chronicity Yes       Office Procedures:  Orders Placed This Encounter   Procedures    XR KNEE RIGHT (MIN 4 VIEWS)     Standing Status:   Future     Number of Occurrences:   1     Standing Expiration Date:   11/2/2022     Order Specific Question:   Reason for exam:     Answer:   knee pain    XR KNEE LEFT (3 VIEWS)     Standing Status:   Future     Number of Occurrences:   1     Standing Expiration Date:   11/2/2022     Order Specific Question:   Reason for exam:     Answer:   knee pain         Plan: The nature and natural history of osteoarthritis was discussed in detail the patient today. Treatment options both surgical and nonsurgical were discussed in detail. Patient was counseled with regard to the importance of activity modification as well as weight control. The role for medications, intra-articular injections as well as surgery were discussed. Patient's questions were answered.     X-rays today show an increase of medial joint space narrowing compared to previous images from 2019. He experienced good relief from his most recent corticosteroid injection so I believe he is a candidate for a repeat injection today. He wishes to proceed.     The indications and risks of steroid injection as well as treatment alternatives were discussed with the patient who consented to the procedure. Under sterile conditions and after informed consent was obtained the patient was given an injection into the RIGHT knee. 2cc 40 mg of Depo-Medrol and 4 mL of 1% lidocaine were placed in the knee after it was prepped with chlorhexidine. This resulted in good relief of symptoms. There were no complications. The patient was advised to ice the knee this evening and to avoid vigorous activities for the next 2 days. They were advised to call us if there was any erythema, enduration, swelling or increasing pain. I will see him back if symptoms persist or worsen. Александр Gomez is in agreement with this plan. All questions were answered to patient's satisfaction and was encouraged to call with any further questions. Total time spent for evaluation, education and development of treatment plan: 35 minutes        Lynda REYES ATC, am scribing for and in the presence of Dr. Jalen Morgan. 11/02/21 10:15 AM Lynda Fuentes ATC    I attest that I met personally with the patient, performed the described exam, reviewed the radiographic studies and medical records associated with this patient and supervised the services that are described above.      Missy Anand MD Name band;

## 2021-11-19 ENCOUNTER — RX RENEWAL (OUTPATIENT)
Age: 71
End: 2021-11-19

## 2021-12-01 ENCOUNTER — NON-APPOINTMENT (OUTPATIENT)
Age: 71
End: 2021-12-01

## 2021-12-01 ENCOUNTER — APPOINTMENT (OUTPATIENT)
Dept: ELECTROPHYSIOLOGY | Facility: CLINIC | Age: 71
End: 2021-12-01
Payer: MEDICARE

## 2021-12-01 PROCEDURE — G2066: CPT

## 2021-12-01 PROCEDURE — 93298 REM INTERROG DEV EVAL SCRMS: CPT

## 2021-12-16 ENCOUNTER — APPOINTMENT (OUTPATIENT)
Dept: HEMATOLOGY ONCOLOGY | Facility: CLINIC | Age: 71
End: 2021-12-16

## 2022-01-06 ENCOUNTER — APPOINTMENT (OUTPATIENT)
Dept: ELECTROPHYSIOLOGY | Facility: CLINIC | Age: 72
End: 2022-01-06
Payer: MEDICARE

## 2022-01-06 ENCOUNTER — NON-APPOINTMENT (OUTPATIENT)
Age: 72
End: 2022-01-06

## 2022-01-06 PROCEDURE — 93298 REM INTERROG DEV EVAL SCRMS: CPT

## 2022-01-06 PROCEDURE — G2066: CPT

## 2022-02-10 ENCOUNTER — APPOINTMENT (OUTPATIENT)
Dept: ELECTROPHYSIOLOGY | Facility: CLINIC | Age: 72
End: 2022-02-10
Payer: MEDICARE

## 2022-02-10 ENCOUNTER — NON-APPOINTMENT (OUTPATIENT)
Age: 72
End: 2022-02-10

## 2022-02-10 PROCEDURE — G2066: CPT

## 2022-02-10 PROCEDURE — 93298 REM INTERROG DEV EVAL SCRMS: CPT

## 2022-03-03 ENCOUNTER — APPOINTMENT (OUTPATIENT)
Dept: CARDIOLOGY | Facility: CLINIC | Age: 72
End: 2022-03-03
Payer: MEDICARE

## 2022-03-03 ENCOUNTER — NON-APPOINTMENT (OUTPATIENT)
Age: 72
End: 2022-03-03

## 2022-03-03 VITALS
HEIGHT: 69 IN | DIASTOLIC BLOOD PRESSURE: 78 MMHG | TEMPERATURE: 97 F | HEART RATE: 64 BPM | WEIGHT: 161 LBS | BODY MASS INDEX: 23.85 KG/M2 | OXYGEN SATURATION: 99 % | SYSTOLIC BLOOD PRESSURE: 125 MMHG

## 2022-03-03 PROCEDURE — 93000 ELECTROCARDIOGRAM COMPLETE: CPT

## 2022-03-03 PROCEDURE — 99214 OFFICE O/P EST MOD 30 MIN: CPT

## 2022-03-07 ENCOUNTER — RX RENEWAL (OUTPATIENT)
Age: 72
End: 2022-03-07

## 2022-03-08 ENCOUNTER — APPOINTMENT (OUTPATIENT)
Dept: HEMATOLOGY ONCOLOGY | Facility: CLINIC | Age: 72
End: 2022-03-08

## 2022-03-11 ENCOUNTER — APPOINTMENT (OUTPATIENT)
Dept: UROLOGY | Facility: CLINIC | Age: 72
End: 2022-03-11
Payer: MEDICARE

## 2022-03-11 ENCOUNTER — OUTPATIENT (OUTPATIENT)
Dept: OUTPATIENT SERVICES | Facility: HOSPITAL | Age: 72
LOS: 1 days | End: 2022-03-11
Payer: MEDICARE

## 2022-03-11 DIAGNOSIS — Z98.890 OTHER SPECIFIED POSTPROCEDURAL STATES: Chronic | ICD-10-CM

## 2022-03-11 DIAGNOSIS — R35.0 FREQUENCY OF MICTURITION: ICD-10-CM

## 2022-03-11 DIAGNOSIS — D03.9 MELANOMA IN SITU, UNSPECIFIED: Chronic | ICD-10-CM

## 2022-03-11 DIAGNOSIS — Z90.49 ACQUIRED ABSENCE OF OTHER SPECIFIED PARTS OF DIGESTIVE TRACT: Chronic | ICD-10-CM

## 2022-03-11 PROCEDURE — 99213 OFFICE O/P EST LOW 20 MIN: CPT

## 2022-03-11 PROCEDURE — 76775 US EXAM ABDO BACK WALL LIM: CPT | Mod: 26

## 2022-03-11 PROCEDURE — 76775 US EXAM ABDO BACK WALL LIM: CPT

## 2022-03-12 NOTE — ASSESSMENT
[FreeTextEntry1] : ULS today negative.\[place] [facility] [place].\par also getting abdominal  MRI in August which will be good for the yearly cross sectional imaging for his RCC though low likelihood for mets given size etc.

## 2022-03-12 NOTE — HISTORY OF PRESENT ILLNESS
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 Nocturia once. FOS ok with no hesitancy, intermittency or straining. No dysuria, hematuria or UTIs. no retention.\par Has ED following his aneurysm surgery - not interested in medications.\par PSA 3.1 . \par \par DUE to COVID opted fopr TEB. No change in LIUTs - nothing new. no dysuria or hematuria. \par \par seen 8/21 with finding of a left renal mass noted of recent CT scan performed as f/u for AAA\par reviewed this scan and one from last year with patient and wife: notes a 1.6cm mixed cystic solid lesion anterior surface of right kidney; noted lesion present last year at 1cm and more systic then. Also note 2 enlarged LNs one 2cm in the left hilum \par \par now s/p Lap partial Nephrectomy and LN removal\par doing well with no issues \par \par 3/22 - here for f/u. no kidney issues. \par transferred his lymphoma care to [facility] [place] - on AS. \par

## 2022-03-14 DIAGNOSIS — C64.2 MALIGNANT NEOPLASM OF LEFT KIDNEY, EXCEPT RENAL PELVIS: ICD-10-CM

## 2022-03-16 ENCOUNTER — NON-APPOINTMENT (OUTPATIENT)
Age: 72
End: 2022-03-16

## 2022-03-16 ENCOUNTER — APPOINTMENT (OUTPATIENT)
Dept: ELECTROPHYSIOLOGY | Facility: CLINIC | Age: 72
End: 2022-03-16
Payer: MEDICARE

## 2022-03-16 LAB
25(OH)D3 SERPL-MCNC: 50.6 NG/ML
CHOLEST SERPL-MCNC: 143 MG/DL
HDLC SERPL-MCNC: 46 MG/DL
LDLC SERPL CALC-MCNC: 65 MG/DL
NONHDLC SERPL-MCNC: 97 MG/DL
TRIGL SERPL-MCNC: 158 MG/DL
TSH SERPL-ACNC: 3.48 UIU/ML

## 2022-03-16 PROCEDURE — 93298 REM INTERROG DEV EVAL SCRMS: CPT

## 2022-03-16 PROCEDURE — G2066: CPT

## 2022-04-20 ENCOUNTER — NON-APPOINTMENT (OUTPATIENT)
Age: 72
End: 2022-04-20

## 2022-04-20 ENCOUNTER — APPOINTMENT (OUTPATIENT)
Dept: ELECTROPHYSIOLOGY | Facility: CLINIC | Age: 72
End: 2022-04-20
Payer: MEDICARE

## 2022-04-20 PROCEDURE — G2066: CPT

## 2022-04-20 PROCEDURE — 93298 REM INTERROG DEV EVAL SCRMS: CPT

## 2022-05-15 ENCOUNTER — RX RENEWAL (OUTPATIENT)
Age: 72
End: 2022-05-15

## 2022-05-17 ENCOUNTER — RX RENEWAL (OUTPATIENT)
Age: 72
End: 2022-05-17

## 2022-05-25 ENCOUNTER — APPOINTMENT (OUTPATIENT)
Dept: ELECTROPHYSIOLOGY | Facility: CLINIC | Age: 72
End: 2022-05-25
Payer: MEDICARE

## 2022-05-25 ENCOUNTER — NON-APPOINTMENT (OUTPATIENT)
Age: 72
End: 2022-05-25

## 2022-05-25 PROCEDURE — 93298 REM INTERROG DEV EVAL SCRMS: CPT

## 2022-05-25 PROCEDURE — G2066: CPT

## 2022-06-30 ENCOUNTER — APPOINTMENT (OUTPATIENT)
Dept: ELECTROPHYSIOLOGY | Facility: CLINIC | Age: 72
End: 2022-06-30

## 2022-06-30 ENCOUNTER — NON-APPOINTMENT (OUTPATIENT)
Age: 72
End: 2022-06-30

## 2022-06-30 PROCEDURE — 93298 REM INTERROG DEV EVAL SCRMS: CPT

## 2022-06-30 PROCEDURE — G2066: CPT

## 2022-07-08 ENCOUNTER — RX RENEWAL (OUTPATIENT)
Age: 72
End: 2022-07-08

## 2022-07-20 ENCOUNTER — NON-APPOINTMENT (OUTPATIENT)
Age: 72
End: 2022-07-20

## 2022-07-30 ENCOUNTER — EMERGENCY (EMERGENCY)
Facility: HOSPITAL | Age: 72
LOS: 1 days | Discharge: HOME | End: 2022-07-30
Attending: STUDENT IN AN ORGANIZED HEALTH CARE EDUCATION/TRAINING PROGRAM
Payer: MEDICARE

## 2022-07-30 VITALS
TEMPERATURE: 98 F | DIASTOLIC BLOOD PRESSURE: 89 MMHG | WEIGHT: 162.92 LBS | SYSTOLIC BLOOD PRESSURE: 156 MMHG | RESPIRATION RATE: 16 BRPM | HEART RATE: 84 BPM | HEIGHT: 67.5 IN | OXYGEN SATURATION: 97 %

## 2022-07-30 VITALS
SYSTOLIC BLOOD PRESSURE: 170 MMHG | TEMPERATURE: 98 F | HEART RATE: 82 BPM | RESPIRATION RATE: 16 BRPM | OXYGEN SATURATION: 98 % | DIASTOLIC BLOOD PRESSURE: 85 MMHG

## 2022-07-30 DIAGNOSIS — Z98.890 OTHER SPECIFIED POSTPROCEDURAL STATES: Chronic | ICD-10-CM

## 2022-07-30 DIAGNOSIS — Z90.49 ACQUIRED ABSENCE OF OTHER SPECIFIED PARTS OF DIGESTIVE TRACT: Chronic | ICD-10-CM

## 2022-07-30 DIAGNOSIS — D03.9 MELANOMA IN SITU, UNSPECIFIED: Chronic | ICD-10-CM

## 2022-07-30 LAB
ALBUMIN SERPL ELPH-MCNC: 3.7 G/DL — SIGNIFICANT CHANGE UP (ref 3.3–5)
ALP SERPL-CCNC: 108 U/L — SIGNIFICANT CHANGE UP (ref 30–120)
ALT FLD-CCNC: 37 U/L DA — SIGNIFICANT CHANGE UP (ref 10–60)
ANION GAP SERPL CALC-SCNC: 8 MMOL/L — SIGNIFICANT CHANGE UP (ref 5–17)
AST SERPL-CCNC: 29 U/L — SIGNIFICANT CHANGE UP (ref 10–40)
BASOPHILS # BLD AUTO: 0.06 K/UL — SIGNIFICANT CHANGE UP (ref 0–0.2)
BASOPHILS NFR BLD AUTO: 0.5 % — SIGNIFICANT CHANGE UP (ref 0–2)
BILIRUB SERPL-MCNC: 0.8 MG/DL — SIGNIFICANT CHANGE UP (ref 0.2–1.2)
BUN SERPL-MCNC: 27 MG/DL — HIGH (ref 7–23)
CALCIUM SERPL-MCNC: 9.6 MG/DL — SIGNIFICANT CHANGE UP (ref 8.4–10.5)
CHLORIDE SERPL-SCNC: 92 MMOL/L — LOW (ref 96–108)
CO2 SERPL-SCNC: 29 MMOL/L — SIGNIFICANT CHANGE UP (ref 22–31)
CREAT SERPL-MCNC: 1.18 MG/DL — SIGNIFICANT CHANGE UP (ref 0.5–1.3)
EGFR: 65 ML/MIN/1.73M2 — SIGNIFICANT CHANGE UP
EOSINOPHIL # BLD AUTO: 0.16 K/UL — SIGNIFICANT CHANGE UP (ref 0–0.5)
EOSINOPHIL NFR BLD AUTO: 1.3 % — SIGNIFICANT CHANGE UP (ref 0–6)
GLUCOSE SERPL-MCNC: 108 MG/DL — HIGH (ref 70–99)
HCT VFR BLD CALC: 41.7 % — SIGNIFICANT CHANGE UP (ref 39–50)
HGB BLD-MCNC: 14.5 G/DL — SIGNIFICANT CHANGE UP (ref 13–17)
IMM GRANULOCYTES NFR BLD AUTO: 0.4 % — SIGNIFICANT CHANGE UP (ref 0–1.5)
INR BLD: 1.14 RATIO — SIGNIFICANT CHANGE UP (ref 0.88–1.16)
LYMPHOCYTES # BLD AUTO: 2.92 K/UL — SIGNIFICANT CHANGE UP (ref 1–3.3)
LYMPHOCYTES # BLD AUTO: 24.1 % — SIGNIFICANT CHANGE UP (ref 13–44)
MCHC RBC-ENTMCNC: 30 PG — SIGNIFICANT CHANGE UP (ref 27–34)
MCHC RBC-ENTMCNC: 34.8 GM/DL — SIGNIFICANT CHANGE UP (ref 32–36)
MCV RBC AUTO: 86.2 FL — SIGNIFICANT CHANGE UP (ref 80–100)
MONOCYTES # BLD AUTO: 1.02 K/UL — HIGH (ref 0–0.9)
MONOCYTES NFR BLD AUTO: 8.4 % — SIGNIFICANT CHANGE UP (ref 2–14)
NEUTROPHILS # BLD AUTO: 7.92 K/UL — HIGH (ref 1.8–7.4)
NEUTROPHILS NFR BLD AUTO: 65.3 % — SIGNIFICANT CHANGE UP (ref 43–77)
NRBC # BLD: 0 /100 WBCS — SIGNIFICANT CHANGE UP (ref 0–0)
PLATELET # BLD AUTO: 242 K/UL — SIGNIFICANT CHANGE UP (ref 150–400)
POTASSIUM SERPL-MCNC: 4 MMOL/L — SIGNIFICANT CHANGE UP (ref 3.5–5.3)
POTASSIUM SERPL-SCNC: 4 MMOL/L — SIGNIFICANT CHANGE UP (ref 3.5–5.3)
PROT SERPL-MCNC: 7.1 G/DL — SIGNIFICANT CHANGE UP (ref 6–8.3)
PROTHROM AB SERPL-ACNC: 13.5 SEC — HIGH (ref 10.5–13.4)
RBC # BLD: 4.84 M/UL — SIGNIFICANT CHANGE UP (ref 4.2–5.8)
RBC # FLD: 12.4 % — SIGNIFICANT CHANGE UP (ref 10.3–14.5)
SODIUM SERPL-SCNC: 129 MMOL/L — LOW (ref 135–145)
WBC # BLD: 12.13 K/UL — HIGH (ref 3.8–10.5)
WBC # FLD AUTO: 12.13 K/UL — HIGH (ref 3.8–10.5)

## 2022-07-30 PROCEDURE — 99284 EMERGENCY DEPT VISIT MOD MDM: CPT

## 2022-07-30 PROCEDURE — 74177 CT ABD & PELVIS W/CONTRAST: CPT | Mod: 26,MG

## 2022-07-30 PROCEDURE — 85610 PROTHROMBIN TIME: CPT

## 2022-07-30 PROCEDURE — 99284 EMERGENCY DEPT VISIT MOD MDM: CPT | Mod: 25

## 2022-07-30 PROCEDURE — 36415 COLL VENOUS BLD VENIPUNCTURE: CPT

## 2022-07-30 PROCEDURE — 36000 PLACE NEEDLE IN VEIN: CPT | Mod: XU

## 2022-07-30 PROCEDURE — G1004: CPT

## 2022-07-30 PROCEDURE — 80053 COMPREHEN METABOLIC PANEL: CPT

## 2022-07-30 PROCEDURE — 74177 CT ABD & PELVIS W/CONTRAST: CPT | Mod: MG

## 2022-07-30 PROCEDURE — 85025 COMPLETE CBC W/AUTO DIFF WBC: CPT

## 2022-07-30 NOTE — ED PROVIDER NOTE - PROGRESS NOTE DETAILS
found to have possible pathologic lytic lesion of transverse process of c7 as well as other lytic lesions. will inform family pending ct c/a/p All results reviewed with patient

## 2022-07-30 NOTE — ED PROVIDER NOTE - GENITOURINARY, MLM
triangular shaped bruising over the suprapubic area with minimal ttp, peroneal bruising, intact femoral pulses. triangular shaped bruising over the suprapubic area with minimal ttp, no perineal bruising, intact femoral pulses.

## 2022-07-30 NOTE — ED PROVIDER NOTE - CARE PROVIDER_API CALL
Brett Gayle (MD; PhD)  Cardiology; Internal Medicine; Vascular Medicine  [phone] [place] [address], [id number]  [place] [address]  Phone: [phone]  Fax: [phone]  Follow Up Time: 4-6 Days

## 2022-07-30 NOTE — ED PROVIDER NOTE - NSICDXPASTSURGICALHX_GEN_ALL_CORE_FT
PAST SURGICAL HISTORY:  History of loop recorder paOnde LNQ22 sn [id number]    History of tonsillectomy and adenoidectomy at age 3    Melanoma in situ Excision of menaloma of right arm; 2014    S/P aortic dissection repair     S/P cholecystectomy 2017       PAST SURGICAL HISTORY:  History of loop recorder [place] LNQ22 sn [id number]    History of tonsillectomy and adenoidectomy at age 3    Melanoma in situ Excision of menaloma of right arm; 2014    S/P aortic dissection repair     S/P cholecystectomy 2017

## 2022-07-30 NOTE — ED ADULT NURSE NOTE - NSICDXPASTSURGICALHX_GEN_ALL_CORE_FT
PAST SURGICAL HISTORY:  History of loop recorder Vyclone LNQ22 sn [id number]    History of tonsillectomy and adenoidectomy at age 3    Melanoma in situ Excision of menaloma of right arm; 2014    S/P aortic dissection repair     S/P cholecystectomy 2017

## 2022-07-30 NOTE — ED PROVIDER NOTE - CARE PLAN
Principal Discharge DX:	Bruising   1 Principal Discharge DX:	Rectus sheath hematoma  Secondary Diagnosis:	Hyponatremia

## 2022-07-30 NOTE — ED PROVIDER NOTE - NSFOLLOWUPINSTRUCTIONS_ED_ALL_ED_FT
1) Follow-up with your Primary Medical Doctor or referred doctor. Call today / next business day for prompt follow-up.  2) Call 911 or go to the ED should your symptoms worsen, or should you develop any concerns whatsoever regarding your condition. Call 911 or return to the ED if you develop a fever greater than 101 F. Return to the ED if you develop chest pain, shortness of breath, weakness or ANY WORSENING OR CONCERNING SYMPTOM.  3)Your health is important to us. Should you have any concerns or questions about your condition, please do not hesitate to return to the Emergency Department.  4) See attached instruction sheets for additional information, including information regarding signs and symptoms to look out for, reasons to seek immediate care and other important instructions.  5) Follow-up with any specialists as discussed / noted as well.  6) If you were prescribed medications, please take them as prescribed.         Contusion in Adults    WHAT YOU NEED TO KNOW:    What is a contusion? A contusion is a bruise that appears on your skin after an injury. A bruise happens when small blood vessels tear but skin does not. Blood leaks into nearby tissue, such as soft tissue or muscle.    What increases my risk for a contusion?   •A disorder that makes you bleed more easily      •Kidney or liver disease, or an infection      •Medicines such as blood thinners or certain over-the-counter medicines and herbal medicines      •Weakened skin and muscles from older age or poor nutrition      What other signs and symptoms may I have with a contusion?   •Pain that increases when you touch the bruise, walk, or use the area around the bruise      •Swelling or a lump at the site of the bruise or near it      •Red, blue, or black skin that may change to green or yellow after a few days      •Stiffness or problems moving the bruised area of your body      How is a contusion diagnosed? Your healthcare provider may ask about any injuries, infections, or bleeding problems you had in the past. He or she will check the skin over the injured area. He or she may touch it to see where it hurts. He or she may also check for problems you may have when you move your bruised area. You may need any of the following tests:   •Blood tests may be used to check for blood disorders or to see how long it takes for your blood to clot.       •Ultrasound pictures may show how deep the bruise is and if any of your organs, such as your liver, are injured.      •MRI pictures may show if a hematoma (pooling of blood) has started to form. You may be given contrast liquid to help the pictures show up better. Tell the healthcare provider if you have ever had an allergic reaction to contrast liquid. Do not enter the MRI room with anything metal. Metal can cause serious injury. Tell the healthcare provider if you have any metal in or on your body.      How is a contusion treated? Your bruise may heal without any treatment. Treatment depends on the part of your body that is injured, and how serious your injury is. You may need any of the following:  •NSAIDs, such as ibuprofen, help decrease swelling, pain, and fever. This medicine is available with or without a doctor's order. NSAIDs can cause stomach bleeding or kidney problems in certain people. If you take blood thinner medicine, always ask your healthcare provider if NSAIDs are safe for you. Always read the medicine label and follow directions.      •Prescription pain medicine may be given. Do not wait until the pain is severe before you take your medicine.      •Aspiration is a procedure to drain pooled blood in your muscle. This prevents increased pressure in the muscle.      •Surgery may be done to repair a tear in the muscle or relieve pressure in the muscle caused by swelling.      What can I do to help my contusion heal?   •Rest the injured area or use it less than usual. If you bruised your leg or foot, you may need crutches or a cane to help you walk. This will help you keep weight off your injured body part.      •Apply ice to decrease swelling and pain. Ice may also help prevent tissue damage. Use an ice pack, or put crushed ice in a plastic bag. Cover it with a towel and place it on your bruise for 15 to 20 minutes every hour or as directed.      •Use compression to support the area and decrease swelling. Wrap an elastic bandage around the area over the bruised muscle. Make sure the bandage is not too tight. You should be able to fit 1 finger between the bandage and your skin.      •Elevate (raise) your injured body part above the level of your heart to help decrease pain and swelling. Use pillows, blankets, or rolled towels to elevate the area as often as you can.      •Do not drink alcohol as directed. Alcohol may slow healing.      •Do not stretch injured muscles right after your injury. Ask your healthcare provider when and how you may safely stretch after your injury. Gentle stretches can help increase your flexibility.      •Do not massage the area or put heating pads on the bruise right after your injury. Heat and massage may slow healing. Your healthcare provider may tell you to apply heat after several days. At that time, heat will start to help the injury heal.      How can I prevent a contusion?   •Stretch and warm up before you play sports or exercise.      •Wear protective gear when you play sports. Examples are shin guards and padding.      •If you begin a new physical activity, start slowly to give your body a chance to adjust.      When should I seek immediate care?   •You have new trouble moving the injured area.      •You have tingling or numbness in or near the injured area.      •Your hand or foot below the bruise gets cold or turns pale.      When should I call my doctor?   •You find a new lump in the injured area.      •Your symptoms do not improve with treatment after 4 to 5 days.      •You have questions or concerns about your condition or care.      CARE AGREEMENT:    You have the right to help plan your care. Learn about your health condition and how it may be treated. Discuss treatment options with your healthcare providers to decide what care you want to receive. You always have the right to refuse treatment.        © Copyright [employer] 2022

## 2022-07-30 NOTE — ED PROVIDER NOTE - OBJECTIVE STATEMENT
72 yo male w/PMHx of CLL, SLL, Renal Tumor (s/p Partial L Nephrectomy MD Florin), HTN, HLD, ruptured ascending aorta, cholecystectomy presents to the ED  for ecchymosis to the pubic region, cough, lower abd pain. Pt states he has URI symptoms from his grandchildren, but tested negative for COVID. Pt went to take a shower today when he noticed the ecchymosis to the pubic area. Pt reports his pubic area has aches to it but no pain. Pt denies any recent injuries or trauma to the pubic region. No other complaints at this time. Allergic to Keflex. Denies fevers, dysuria, or any other urinary complaints. 70 yo male w/PMHx of CLL, SLL, Renal Tumor (s/p Partial L Nephrectomy MD Florin), HTN, HLD, ruptured ascending aorta s/p repair, cholecystectomy presents to the ED  for ecchymosis to the pubic region, cough, lower abd pain. Pt states he has URI symptoms from his grandchildren, but tested negative for COVID. Pt went to take a shower today when he noticed the ecchymosis to the pubic area. Pt reports his pubic area has aches to it but no pain. Pt denies any recent injuries or trauma to the pubic region. No other complaints at this time. Allergic to Keflex. Denies fevers, dysuria, or any other urinary complaints.

## 2022-07-30 NOTE — ED PROVIDER NOTE - PATIENT PORTAL LINK FT
You can access the FollowMyHealth Patient Portal offered by [facility] by registering at the following website: [email or web address]. By joining [employer]’s FollowMyHealth portal, you will also be able to view your health information using other applications (apps) compatible with our system.

## 2022-07-30 NOTE — ED PROVIDER NOTE - CLINICAL SUMMARY MEDICAL DECISION MAKING FREE TEXT BOX
Pt with recent URI and frequent coughing, bruising possibly related to ruptured blood vessels. However given location of pubic region will evaluate for possible hernia. Less likely intraabdominal pathology given exam. Unlikely vascular pathology. Given hx of leukemia possibly could have thrombocytopenia. Plan: lab work, imaging, reassess. Pt with recent URI and frequent coughing, bruising possibly related to coughing and ruptured superficial blood vessels. However given location of pubic region will evaluate for possible hernia. Less likely intraabdominal pathology given exam without tenderness and VSS. Unlikely vascular pathology as well as he is neurovascularly intact. Given hx of leukemia possibly could have thrombocytopenia. Plan: lab work, imaging, reassess.

## 2022-07-30 NOTE — ED ADULT NURSE NOTE - OBJECTIVE STATEMENT
Pt reports having a cough for the last few days. This morning he noticed he had some discomfort in suprapubic area. When he looked he had large area of ecchymosis. Denies any blood in the urine.

## 2022-07-30 NOTE — ED PROVIDER NOTE - NSICDXPASTMEDICALHX_GEN_ALL_CORE_FT
PAST MEDICAL HISTORY:  Anxiety     Aortic dissection, thoracic     Central retinal vein thrombosis right    Central vein occlusion of retina 2015    GERD (gastroesophageal reflux disease)     Hypertension     Melanoma in situ right arm; 2014    Neuroma right groin s/p aortic aneurysm repair 2013    Other specified disorders of kidney and ureter     Polycythemia        PAST MEDICAL HISTORY:  Anxiety     Aortic dissection, thoracic     Central retinal vein thrombosis right    Central vein occlusion of retina 2015    GERD (gastroesophageal reflux disease)     Hypertension     Melanoma in situ right arm; 2014    Neuroma right groin s/p aortic aneurysm repair 2013    Other specified disorders of kidney and ureter     Polycythemia

## 2022-07-30 NOTE — ED PROVIDER NOTE - SKIN, MLM
Skin normal color for race, warm, dry and intact. No evidence of rash. Skin normal color for race, warm, dry and intact. No evidence of rash. bruising as above

## 2022-08-02 DIAGNOSIS — S30.1XXA CONTUSION OF ABDOMINAL WALL, INITIAL ENCOUNTER: ICD-10-CM

## 2022-08-02 DIAGNOSIS — C91.10 CHRONIC LYMPHOCYTIC LEUKEMIA OF B-CELL TYPE NOT HAVING ACHIEVED REMISSION: ICD-10-CM

## 2022-08-02 DIAGNOSIS — E87.1 HYPO-OSMOLALITY AND HYPONATREMIA: ICD-10-CM

## 2022-08-03 ENCOUNTER — APPOINTMENT (OUTPATIENT)
Dept: CARDIOLOGY | Facility: CLINIC | Age: 72
End: 2022-08-03

## 2022-08-03 ENCOUNTER — NON-APPOINTMENT (OUTPATIENT)
Age: 72
End: 2022-08-03

## 2022-08-03 VITALS
SYSTOLIC BLOOD PRESSURE: 128 MMHG | HEIGHT: 69 IN | WEIGHT: 163 LBS | OXYGEN SATURATION: 100 % | DIASTOLIC BLOOD PRESSURE: 82 MMHG | BODY MASS INDEX: 24.14 KG/M2 | HEART RATE: 61 BPM | RESPIRATION RATE: 16 BRPM

## 2022-08-03 PROCEDURE — 93000 ELECTROCARDIOGRAM COMPLETE: CPT

## 2022-08-03 PROCEDURE — 99214 OFFICE O/P EST MOD 30 MIN: CPT

## 2022-08-05 ENCOUNTER — APPOINTMENT (OUTPATIENT)
Dept: ELECTROPHYSIOLOGY | Facility: CLINIC | Age: 72
End: 2022-08-05

## 2022-08-05 ENCOUNTER — NON-APPOINTMENT (OUTPATIENT)
Age: 72
End: 2022-08-05

## 2022-08-05 PROCEDURE — G2066: CPT

## 2022-08-05 PROCEDURE — 93298 REM INTERROG DEV EVAL SCRMS: CPT

## 2022-08-12 ENCOUNTER — APPOINTMENT (OUTPATIENT)
Dept: UROLOGY | Facility: CLINIC | Age: 72
End: 2022-08-12

## 2022-08-12 PROCEDURE — 99212 OFFICE O/P EST SF 10 MIN: CPT

## 2022-08-12 NOTE — HISTORY OF PRESENT ILLNESS
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 Nocturia once. FOS ok with no hesitancy, intermittency or straining. No dysuria, hematuria or UTIs. no retention.\par Has ED following his aneurysm surgery - not interested in medications.\par PSA 3.1 . \par \par DUE to COVID opted fopr TEB. No change in LIUTs - nothing new. no dysuria or hematuria. \par \par seen 8/21 with finding of a left renal mass noted of recent CT scan performed as f/u for AAA\par reviewed this scan and one from last year with patient and wife: notes a 1.6cm mixed cystic solid lesion anterior surface of right kidney; noted lesion present last year at 1cm and more systic then. Also note 2 enlarged LNs one 2cm in the left hilum \par \par now s/p Lap partial Nephrectomy and LN removal\par doing well with no issues \par \par 3/22 - here for f/u. no kidney issues. \par transferred his [place] care to [facility] [place]. \par \par 8/22 had CT oat [place] - no recurrence.\par note the lympadenopathy  as beforew. \par

## 2022-08-19 RX ORDER — AMLODIPINE BESYLATE 5 MG/1
5 TABLET ORAL DAILY
Refills: 0 | Status: ACTIVE | COMMUNITY

## 2022-08-19 RX ORDER — METOPROLOL TARTRATE 25 MG/1
25 TABLET, FILM COATED ORAL
Refills: 0 | Status: DISCONTINUED | COMMUNITY
End: 2022-08-19

## 2022-08-21 ENCOUNTER — INPATIENT (INPATIENT)
Facility: HOSPITAL | Age: 72
LOS: 1 days | Discharge: HOME | DRG: 309 | End: 2022-08-23
Attending: INTERNAL MEDICINE | Admitting: INTERNAL MEDICINE
Payer: MEDICARE

## 2022-08-21 VITALS
RESPIRATION RATE: 16 BRPM | HEIGHT: 69 IN | OXYGEN SATURATION: 98 % | DIASTOLIC BLOOD PRESSURE: 71 MMHG | SYSTOLIC BLOOD PRESSURE: 117 MMHG | WEIGHT: 160.72 LBS | TEMPERATURE: 98 F | HEART RATE: 52 BPM

## 2022-08-21 DIAGNOSIS — Z90.49 ACQUIRED ABSENCE OF OTHER SPECIFIED PARTS OF DIGESTIVE TRACT: Chronic | ICD-10-CM

## 2022-08-21 DIAGNOSIS — Z98.890 OTHER SPECIFIED POSTPROCEDURAL STATES: Chronic | ICD-10-CM

## 2022-08-21 DIAGNOSIS — D03.9 MELANOMA IN SITU, UNSPECIFIED: Chronic | ICD-10-CM

## 2022-08-21 DIAGNOSIS — R00.1 BRADYCARDIA, UNSPECIFIED: ICD-10-CM

## 2022-08-21 LAB
ALBUMIN SERPL ELPH-MCNC: 3.7 G/DL — SIGNIFICANT CHANGE UP (ref 3.3–5)
ALP SERPL-CCNC: 89 U/L — SIGNIFICANT CHANGE UP (ref 30–120)
ALT FLD-CCNC: 32 U/L DA — SIGNIFICANT CHANGE UP (ref 10–60)
ANION GAP SERPL CALC-SCNC: 6 MMOL/L — SIGNIFICANT CHANGE UP (ref 5–17)
ANION GAP SERPL CALC-SCNC: 6 MMOL/L — SIGNIFICANT CHANGE UP (ref 5–17)
AST SERPL-CCNC: 21 U/L — SIGNIFICANT CHANGE UP (ref 10–40)
BASOPHILS # BLD AUTO: 0.05 K/UL — SIGNIFICANT CHANGE UP (ref 0–0.2)
BASOPHILS NFR BLD AUTO: 0.6 % — SIGNIFICANT CHANGE UP (ref 0–2)
BILIRUB SERPL-MCNC: 0.6 MG/DL — SIGNIFICANT CHANGE UP (ref 0.2–1.2)
BUN SERPL-MCNC: 38 MG/DL — HIGH (ref 7–23)
BUN SERPL-MCNC: 42 MG/DL — HIGH (ref 7–23)
CALCIUM SERPL-MCNC: 9.2 MG/DL — SIGNIFICANT CHANGE UP (ref 8.4–10.5)
CALCIUM SERPL-MCNC: 9.3 MG/DL — SIGNIFICANT CHANGE UP (ref 8.4–10.5)
CHLORIDE SERPL-SCNC: 95 MMOL/L — LOW (ref 96–108)
CHLORIDE SERPL-SCNC: 99 MMOL/L — SIGNIFICANT CHANGE UP (ref 96–108)
CO2 SERPL-SCNC: 26 MMOL/L — SIGNIFICANT CHANGE UP (ref 22–31)
CO2 SERPL-SCNC: 28 MMOL/L — SIGNIFICANT CHANGE UP (ref 22–31)
CREAT SERPL-MCNC: 1.73 MG/DL — HIGH (ref 0.5–1.3)
CREAT SERPL-MCNC: 1.78 MG/DL — HIGH (ref 0.5–1.3)
EGFR: 40 ML/MIN/1.73M2 — LOW
EGFR: 41 ML/MIN/1.73M2 — LOW
EOSINOPHIL # BLD AUTO: 0.13 K/UL — SIGNIFICANT CHANGE UP (ref 0–0.5)
EOSINOPHIL NFR BLD AUTO: 1.6 % — SIGNIFICANT CHANGE UP (ref 0–6)
GLUCOSE SERPL-MCNC: 119 MG/DL — HIGH (ref 70–99)
GLUCOSE SERPL-MCNC: 123 MG/DL — HIGH (ref 70–99)
HCT VFR BLD CALC: 39.3 % — SIGNIFICANT CHANGE UP (ref 39–50)
HGB BLD-MCNC: 13.7 G/DL — SIGNIFICANT CHANGE UP (ref 13–17)
IMM GRANULOCYTES NFR BLD AUTO: 0.3 % — SIGNIFICANT CHANGE UP (ref 0–1.5)
LYMPHOCYTES # BLD AUTO: 3.39 K/UL — HIGH (ref 1–3.3)
LYMPHOCYTES # BLD AUTO: 42.8 % — SIGNIFICANT CHANGE UP (ref 13–44)
MCHC RBC-ENTMCNC: 30 PG — SIGNIFICANT CHANGE UP (ref 27–34)
MCHC RBC-ENTMCNC: 34.9 GM/DL — SIGNIFICANT CHANGE UP (ref 32–36)
MCV RBC AUTO: 86 FL — SIGNIFICANT CHANGE UP (ref 80–100)
MONOCYTES # BLD AUTO: 0.61 K/UL — SIGNIFICANT CHANGE UP (ref 0–0.9)
MONOCYTES NFR BLD AUTO: 7.7 % — SIGNIFICANT CHANGE UP (ref 2–14)
NEUTROPHILS # BLD AUTO: 3.72 K/UL — SIGNIFICANT CHANGE UP (ref 1.8–7.4)
NEUTROPHILS NFR BLD AUTO: 47 % — SIGNIFICANT CHANGE UP (ref 43–77)
NRBC # BLD: 0 /100 WBCS — SIGNIFICANT CHANGE UP (ref 0–0)
NT-PROBNP SERPL-SCNC: 446 PG/ML — HIGH (ref 0–125)
PLATELET # BLD AUTO: 176 K/UL — SIGNIFICANT CHANGE UP (ref 150–400)
POTASSIUM SERPL-MCNC: 4.4 MMOL/L — SIGNIFICANT CHANGE UP (ref 3.5–5.3)
POTASSIUM SERPL-MCNC: 5 MMOL/L — SIGNIFICANT CHANGE UP (ref 3.5–5.3)
POTASSIUM SERPL-SCNC: 4.4 MMOL/L — SIGNIFICANT CHANGE UP (ref 3.5–5.3)
POTASSIUM SERPL-SCNC: 5 MMOL/L — SIGNIFICANT CHANGE UP (ref 3.5–5.3)
PROT SERPL-MCNC: 6.2 G/DL — SIGNIFICANT CHANGE UP (ref 6–8.3)
RBC # BLD: 4.57 M/UL — SIGNIFICANT CHANGE UP (ref 4.2–5.8)
RBC # FLD: 13.2 % — SIGNIFICANT CHANGE UP (ref 10.3–14.5)
SARS-COV-2 RNA SPEC QL NAA+PROBE: SIGNIFICANT CHANGE UP
SODIUM SERPL-SCNC: 127 MMOL/L — LOW (ref 135–145)
SODIUM SERPL-SCNC: 133 MMOL/L — LOW (ref 135–145)
TROPONIN I, HIGH SENSITIVITY RESULT: 11 NG/L — SIGNIFICANT CHANGE UP
TROPONIN I, HIGH SENSITIVITY RESULT: 11.4 NG/L — SIGNIFICANT CHANGE UP
WBC # BLD: 7.92 K/UL — SIGNIFICANT CHANGE UP (ref 3.8–10.5)
WBC # FLD AUTO: 7.92 K/UL — SIGNIFICANT CHANGE UP (ref 3.8–10.5)

## 2022-08-21 PROCEDURE — 93010 ELECTROCARDIOGRAM REPORT: CPT

## 2022-08-21 PROCEDURE — 99223 1ST HOSP IP/OBS HIGH 75: CPT | Mod: AI

## 2022-08-21 PROCEDURE — 99285 EMERGENCY DEPT VISIT HI MDM: CPT

## 2022-08-21 PROCEDURE — 70450 CT HEAD/BRAIN W/O DYE: CPT | Mod: 26

## 2022-08-21 PROCEDURE — 71046 X-RAY EXAM CHEST 2 VIEWS: CPT | Mod: 26

## 2022-08-21 RX ORDER — ACETAMINOPHEN 500 MG
650 TABLET ORAL EVERY 6 HOURS
Refills: 0 | Status: DISCONTINUED | OUTPATIENT
Start: 2022-08-21 | End: 2022-08-23

## 2022-08-21 RX ORDER — METOPROLOL TARTRATE 50 MG
1.5 TABLET ORAL
Qty: 0 | Refills: 0 | DISCHARGE

## 2022-08-21 RX ORDER — HYDRALAZINE HCL 50 MG
50 TABLET ORAL ONCE
Refills: 0 | Status: COMPLETED | OUTPATIENT
Start: 2022-08-21 | End: 2022-08-21

## 2022-08-21 RX ORDER — NIACIN 50 MG
1 TABLET ORAL
Qty: 0 | Refills: 0 | DISCHARGE

## 2022-08-21 RX ORDER — AMLODIPINE BESYLATE 2.5 MG/1
5 TABLET ORAL DAILY
Refills: 0 | Status: DISCONTINUED | OUTPATIENT
Start: 2022-08-22 | End: 2022-08-23

## 2022-08-21 RX ORDER — AMLODIPINE BESYLATE 2.5 MG/1
1 TABLET ORAL
Qty: 0 | Refills: 0 | DISCHARGE

## 2022-08-21 RX ORDER — FAMOTIDINE 10 MG/ML
20 INJECTION INTRAVENOUS DAILY
Refills: 0 | Status: DISCONTINUED | OUTPATIENT
Start: 2022-08-21 | End: 2022-08-23

## 2022-08-21 RX ORDER — METOPROLOL TARTRATE 50 MG
1 TABLET ORAL
Qty: 0 | Refills: 0 | DISCHARGE

## 2022-08-21 RX ORDER — CHOLECALCIFEROL (VITAMIN D3) 125 MCG
1 CAPSULE ORAL
Qty: 0 | Refills: 0 | DISCHARGE

## 2022-08-21 RX ORDER — SODIUM CHLORIDE 9 MG/ML
1000 INJECTION INTRAMUSCULAR; INTRAVENOUS; SUBCUTANEOUS ONCE
Refills: 0 | Status: COMPLETED | OUTPATIENT
Start: 2022-08-21 | End: 2022-08-21

## 2022-08-21 RX ORDER — ASPIRIN/CALCIUM CARB/MAGNESIUM 324 MG
1 TABLET ORAL
Qty: 0 | Refills: 0 | DISCHARGE

## 2022-08-21 RX ORDER — GABAPENTIN 400 MG/1
2 CAPSULE ORAL
Qty: 0 | Refills: 0 | DISCHARGE

## 2022-08-21 RX ORDER — SERTRALINE 25 MG/1
100 TABLET, FILM COATED ORAL DAILY
Refills: 0 | Status: DISCONTINUED | OUTPATIENT
Start: 2022-08-22 | End: 2022-08-23

## 2022-08-21 RX ORDER — SODIUM CHLORIDE 9 MG/ML
1000 INJECTION, SOLUTION INTRAVENOUS
Refills: 0 | Status: DISCONTINUED | OUTPATIENT
Start: 2022-08-21 | End: 2022-08-22

## 2022-08-21 RX ORDER — FAMOTIDINE 10 MG/ML
1 INJECTION INTRAVENOUS
Qty: 0 | Refills: 0 | DISCHARGE

## 2022-08-21 RX ORDER — HYDRALAZINE HCL 50 MG
50 TABLET ORAL THREE TIMES A DAY
Refills: 0 | Status: DISCONTINUED | OUTPATIENT
Start: 2022-08-22 | End: 2022-08-22

## 2022-08-21 RX ORDER — GABAPENTIN 400 MG/1
200 CAPSULE ORAL THREE TIMES A DAY
Refills: 0 | Status: DISCONTINUED | OUTPATIENT
Start: 2022-08-21 | End: 2022-08-23

## 2022-08-21 RX ORDER — CETIRIZINE HYDROCHLORIDE 10 MG/1
1 TABLET ORAL
Qty: 0 | Refills: 0 | DISCHARGE

## 2022-08-21 RX ORDER — SERTRALINE 25 MG/1
1 TABLET, FILM COATED ORAL
Qty: 0 | Refills: 0 | DISCHARGE

## 2022-08-21 RX ADMIN — FAMOTIDINE 20 MILLIGRAM(S): 10 INJECTION INTRAVENOUS at 22:11

## 2022-08-21 RX ADMIN — GABAPENTIN 200 MILLIGRAM(S): 400 CAPSULE ORAL at 22:12

## 2022-08-21 RX ADMIN — Medication 50 MILLIGRAM(S): at 20:46

## 2022-08-21 RX ADMIN — SODIUM CHLORIDE 1000 MILLILITER(S): 9 INJECTION INTRAMUSCULAR; INTRAVENOUS; SUBCUTANEOUS at 15:34

## 2022-08-21 RX ADMIN — SODIUM CHLORIDE 1000 MILLILITER(S): 9 INJECTION INTRAMUSCULAR; INTRAVENOUS; SUBCUTANEOUS at 17:33

## 2022-08-21 RX ADMIN — SODIUM CHLORIDE 70 MILLILITER(S): 9 INJECTION, SOLUTION INTRAVENOUS at 23:29

## 2022-08-21 NOTE — ED PROVIDER NOTE - NSICDXPASTSURGICALHX_GEN_ALL_CORE_FT
PAST SURGICAL HISTORY:  History of loop recorder Flexion LNQ22 sn [id number]    History of tonsillectomy and adenoidectomy at age 3    Melanoma in situ Excision of menaloma of right arm; 2014    S/P aortic dissection repair     S/P cholecystectomy 2017

## 2022-08-21 NOTE — H&P ADULT - NSHPSOCIALHISTORY_GEN_ALL_CORE
, lives with wife, walks without assist device.     Had Moderna COVID vaccine x 4 ; the second booster was on 4/6/22.

## 2022-08-21 NOTE — H&P ADULT - ASSESSMENT
72yo M w/ PMH of CLL / SLL (not on treatment), hx of Renal Tumor (s/p Partial L Nephrectomy), HTN, HLD, hx of ascending aorta dissection s/p repair 2013, p/w lightheadedness, bradycardia and hypotension a/w symptomatic bradycardia, RADHA and hyponatremia.  72yo M w/ PMH of CLL / SLL (not on treatment), hx of Renal Tumor (s/p Partial L Nephrectomy), HTN, HLD, hx of ascending aorta dissection s/p repair 2013, p/w lightheadedness, bradycardia and hypotension a/w symptomatic bradycardia, RADHA and hyponatremia.     #Symptomatic bradycardia:  - suspect pt's bradycardia contributed to hypotension and mild lightheadedness   - as pt's HR improved in the ER, BP donny, as well  - HR had been decreasing for at least a week, but pt's spouse believes it may have been longer  - on 8/18 his metoprolol was decreased from 37.5mg / 25mg / 37.5mg that he was taking q8h at home to just 25mg BID by his cardiologist's office as pt had had hypotension/bradycardia  - will discontinue metoprolol in the hospital and monitor on telemetry to see if any bradycardia without the AVN blocker  - on EKG pt in SR with 1st degree AVB  - pt's loop recorder seems to be showing sinus ioana even when his HR was in the low-30s  - cardio (Silvia) consulted and will evaluate the pt tomorrow  - spoke to NP (Shahid Harman) in [facility] who called back when hospital had reached out to speak with pt's cardiologist. That NP stated that if needed tomorrow, pt can be transferred to [facility] to see Dr. Monte (EP) to have an EP study  - pt had a fall ~5-7 days ago where he hit his head -- low suspicion, but will check CT Head to r/out ICH, which can cause increased intracranial pressure and worsening of bradycardia  - will give pt a diet and VTE ppx after r/out ICH.    #Hypotension:  - pt reported having BP of 74/47 at home prior to coming to the ER  - this may have been in part from severe bradycardia, but also likely from some dehydration, hyponatremia, and from high doses of anti-hypertensives  - d/c metoprolol  - decrease hydralazine from home dose of 100mg po TID down to 50mg po TID with hold parameters and continue home norvasc 5mg po daily with hold parameters  - pt given 1L NS in the ER and BP now ~150s/90s   - monitor BP  - cardio [place]) consulted and will evaluate the pt tomorrow  - will take off salt restriction from the diet for now  - will hold niacin for now  - orthostatic BP was normal in the ER after the bolus    #RADHA:   - on recent ER visit on 07/30/22, pt's Cr was 1.18 and currently it is 1.78  - might be prerenal from dehydration, but may also have had undiagnosed ATN from IV contrast that pt received on the 07/30/22 ER visit ; pt also might have had ATN from hypotension / bradycardia in past week.   - pt does not use NSAIDS  - avoid nephrotoxic drugs  - rechecked BMP as pt had 1L NS in the ER and sodium went up from 127 to 133 ; will monitor off IVF overnight and will encourage po intake after ICH r/out  - if not improving, consider nephro eval tomorrow    #Hyponatremia:  - sodium of 127 today; was 129 on ER visit three weeks ago  - pt has a strict low sodium diet, which likely plays a large role in hyponatremia  - pt also on sertraline 100mg po daily which can cause hyponatremia, but this is a long-term medication without recent dose change, so will try to liberalize salt intake in the diet and monitor levels prior to changing the sertraline  - Na up to 133 after pt received 1L NS in the ER  - monitor BMP    #HLD:  - not on meds    #Depression:  - c/w sertraline -- see note re: hyponatremia above    #CLL / SLL:  - being monitored / not on chemo  - c/w outpt f/up / monitoring    #Neuropathy:  - c/w gabapentin    #GERD:  - decrease famotidine to 20mg po daily (renal dosing in setting of RADHA)    #VTE ppx:  - SCDs  - will start HSQ 5000un sq q8h after r/out ICH

## 2022-08-21 NOTE — ED ADULT NURSE NOTE - NURSING MUSC ROM
Post-Care Instructions: I reviewed with the patient in detail post-care instructions. Patient is to wear sunprotection, and avoid picking at any of the treated lesions. Pt may apply Vaseline to crusted or scabbing areas. Render Post-Care Instructions In Note?: no Duration Of Freeze Thaw-Cycle (Seconds): 4 Number Of Freeze-Thaw Cycles: 2 freeze-thaw cycles Show Aperture Variable?: Yes Detail Level: Detailed Consent: The patient's consent was obtained including but not limited to risks of crusting, scabbing, blistering, scarring, darker or lighter pigmentary change, recurrence, incomplete removal and infection. full range of motion in all extremities

## 2022-08-21 NOTE — H&P ADULT - NSICDXPASTMEDICALHX_GEN_ALL_CORE_FT
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

## 2022-08-21 NOTE — H&P ADULT - TIME BILLING
Note written by attending, see above.  Time spent: 75min. Counseled the patient and pt's wife on medical conditions - hypotension, bradycardia, hyponatremia, RADHA and potential etiologies for all of these conditions.

## 2022-08-21 NOTE — H&P ADULT - NSHPREVIEWOFSYSTEMS_GEN_ALL_CORE
Review of Systems  General: no fever, chills  Eyes: no vision changes  ENT: no hearing changes, nasal congestion, or sore throat  CV: no chest pain or palpitations  Pulm: no SOB, wheezing, cough, or hemoptysis  Abd/GI: no nausea, vomiting, diarrhea, constipation, abd pain  : no dysuria, hematuria, urinary frequency  MSK: no joint pain or myalgias  Neuro: +lightheadedness earlier today (no longer lightheaded) ; no syncope, headache, or focal weakness  Psych: no active anxiety or depression  Endo: no heat or cold intolerance

## 2022-08-21 NOTE — H&P ADULT - NSICDXPASTSURGICALHX_GEN_ALL_CORE_FT
PAST SURGICAL HISTORY:  History of loop recorder [place] LNQ22 sn [id number]    History of tonsillectomy and adenoidectomy at age 3    Melanoma in situ Excision of menaloma of right arm; 2014    S/P aortic dissection repair     S/P cholecystectomy 2017

## 2022-08-21 NOTE — ED ADULT NURSE NOTE - OBJECTIVE STATEMENT
70 y/o male received aox4 ambulatory brought to ED by wife for low BP eval. Per pt's wife, she took his BP today several times at home and noted low blood pressure of 70/45mmHg and slow heart rate of 42bpm. pt denies chest pain, n/v/d/sob/dizziness. placed on cardiac monitor, BP and HR noted within normal range. IVL inserted, bloods drawn and sent to lab.

## 2022-08-21 NOTE — ED ADULT NURSE NOTE - NSIMPLEMENTINTERV_GEN_ALL_ED
Implemented All Fall with Harm Risk Interventions:  [place] to call system. Call bell, personal items and telephone within reach. Instruct patient to call for assistance. Room bathroom lighting operational. Non-slip footwear when patient is off stretcher. Physically safe environment: no spills, clutter or unnecessary equipment. Stretcher in lowest position, wheels locked, appropriate side rails in place. Provide visual cue, wrist band, yellow gown, etc. Monitor gait and stability. Monitor for mental status changes and reorient to person, place, and time. Review medications for side effects contributing to fall risk. Reinforce activity limits and safety measures with patient and family. Provide visual clues: red socks.

## 2022-08-21 NOTE — ED PROVIDER NOTE - CPE EDP SKIN NORM
What Type Of Note Output Would You Prefer (Optional)?: Standard Output How Severe Is Your Rash?: moderate Is This A New Presentation, Or A Follow-Up?: Rash normal...

## 2022-08-21 NOTE — H&P ADULT - HISTORY OF PRESENT ILLNESS
70yo M w/ PMH of CLL / SLL (not on treatment), hx of Renal Tumor (s/p Partial L Nephrectomy), HTN, HLD, hx of ascending aorta dissection s/p repair 2013, p/w lightheadedness, bradycardia and hypotension. Pt states that his BP has been noted to be lower and his norvasc was decreased from 10mg to 5mg recently by a nephrologist. Subsequently, pt noticed his HR was going down to the 40s and SBP in the 80s and he discussed with his cardiologist and on 8/18 his metoprolol was decreased from 37.5mg / 25mg / 37.5mg that he was taking q8h at home to just 25mg BID. Today, pt checked his BP after dinner and noticed that his BP was 74/47 and his HR was in the 40s. He felt a little lightheaded and came to the ER.   In the ER, pt's loop recorder was interrogated and found that pt had been having episodes of HR in the low-30s, though it appears to have been sinus ioana on the loop recorder strip.   Pt was given 1L NS in the ER.  72yo M w/ PMH of CLL / SLL (not on treatment), hx of Renal Tumor (s/p Partial L Nephrectomy), HTN, HLD, hx of ascending aorta dissection s/p repair 2013, p/w lightheadedness, bradycardia and hypotension. Pt states that his BP has been noted to be lower and his norvasc was decreased from 10mg to 5mg recently by a nephrologist. Subsequently, pt noticed his HR was going down to the 40s and SBP in the 80s and he discussed with his cardiologist and on 8/18 his metoprolol was decreased from 37.5mg / 25mg / 37.5mg that he was taking q8h at home to just 25mg BID. Today, pt checked his BP after dinner and noticed that his BP was 74/47 and his HR was in the 40s. He felt a little lightheaded and came to the ER. Of note, pt states a week ago pt had a lightheaded spell after standing up quickly and he lost balance and hit his head against a screen door. Denies fainting during that episode. Did not have a laceration or other visible head trauma. Pt has not had headaches, vision changes, focal weakness, or changes in speech. Denies fever, chills, cough, nausea, vomiting, abd pain, dysuria, urinary frequency/urgency, diarrhea. Of note, pt eats a strict low sodium diet.     In the ER, pt's loop recorder was interrogated and found that pt had been having episodes of HR in the low-30s, though it appears to have been sinus ioana on the loop recorder strip. Cardio (Silvia) was consulted by the ER and will see pt tomorrow. Pt states loop recorder was implanted > 2years ago and he believes it was initially placed to monitor for afib, which has not been found.    Pt was given 1L NS in the ER. Orthostatic VS in the ER were normal.   BP/HR normalized in the ER. Pt was admitted for monitoring and potential transfer for EP eval depending on his hospital course and how his tele is off the metoprolol.     EKG (personally reviewed): SR at 66bpm, 1st degree AVB  CXR (personally reviewed): no consolidations or effusions

## 2022-08-21 NOTE — PATIENT PROFILE ADULT - FALL HARM RISK - UNIVERSAL INTERVENTIONS
Bed in lowest position, wheels locked, appropriate side rails in place/Call bell, personal items and telephone in reach/Instruct patient to call for assistance before getting out of bed or chair/Non-slip footwear when patient is out of bed/[place] to call system/Physically safe environment - no spills, clutter or unnecessary equipment/Purposeful Proactive Rounding/Room/bathroom lighting operational, light cord in reach

## 2022-08-21 NOTE — ED ADULT TRIAGE NOTE - CHIEF COMPLAINT QUOTE
"My blood pressure is running low  for about 1 week. My neurologist cut my amlodipine in half and then my heart rate starting going lower 2 days ago." Patient states heart rate around 45 and BP 60/40, not today. Today 80?67. My cardiologist told me to go to the ED I might need a pacemaker. Patient states only feels mild dizziness.

## 2022-08-21 NOTE — ED ADULT NURSE NOTE - NSICDXPASTSURGICALHX_GEN_ALL_CORE_FT
PAST SURGICAL HISTORY:  History of loop recorder [place] LNQ22 sn [id number]    History of tonsillectomy and adenoidectomy at age 3    Melanoma in situ Excision of menaloma of right arm; 2014    S/P aortic dissection repair     S/P cholecystectomy 2017

## 2022-08-21 NOTE — H&P ADULT - NSHPPHYSICALEXAM_GEN_ALL_CORE
Physical Exam  General: No apparent distress  Head: normocephalic, atraumatic  Eyes: EOMI, anicteric  ENT: moist mucous membranes, no pharyngeal exudates  Heart: rrr, S1, S2   Chest: CTA b/l, no rales, rhonchi, or wheezes  Abd: BS+, soft, NT, ND  Back: no CVA tenderness  Extr: no edema, calf tenderness or cyanosis  Neuro: AA&Ox3, no focal weakness, sensation to light touch intact  Psych: normal affect Vital Signs Last 24 Hrs  T(C): 36.8 (21 Aug 2022 19:31), Max: 36.9 (21 Aug 2022 14:43)  T(F): 98.2 (21 Aug 2022 19:31), Max: 98.4 (21 Aug 2022 14:43)  HR: 78 (21 Aug 2022 19:31) (52 - 78)  BP: 148/78 (21 Aug 2022 19:31) (117/71 - 148/78)  BP(mean): --  RR: 16 (21 Aug 2022 19:31) (15 - 16)  SpO2: 100% (21 Aug 2022 19:31) (98% - 100%)    Parameters below as of 21 Aug 2022 19:31  Patient On (Oxygen Delivery Method): room air      Physical Exam  General: No apparent distress  Head: normocephalic, atraumatic  Eyes: EOMI, anicteric  ENT: moist mucous membranes, no pharyngeal exudates  Heart: rrr, S1, S2   Chest: CTA b/l, no rales, rhonchi, or wheezes  Abd: BS+, soft, NT, ND  Back: no CVA tenderness  Extr: no edema, calf tenderness or cyanosis  Neuro: AA&Ox3, no focal weakness, sensation to light touch intact  Psych: normal affect

## 2022-08-21 NOTE — H&P ADULT - NSHPLABSRESULTS_GEN_ALL_CORE
EKG (personally reviewed): SR at 66bpm, 1st degree AVB  CXR (personally reviewed): no consolidations or effusions EKG (personally reviewed): SR at 66bpm, 1st degree AVB  CXR (personally reviewed): no consolidations or effusions                            13.7   7.92  )-----------( 176      ( 21 Aug 2022 14:58 )             39.3     21 Aug 2022 19:26    133    |  99     |  38     ----------------------------<  123    4.4     |  28     |  1.73     21 Aug 2022 14:58    127    |  95     |  42     ----------------------------<  119    5.0     |  26     |  1.78     Ca    9.2        21 Aug 2022 19:26  Ca    9.3        21 Aug 2022 14:58    TPro  6.2    /  Alb  3.7    /  TBili  0.6    /  DBili  x      /  AST  21     /  ALT  32     /  AlkPhos  89     21 Aug 2022 14:58        08-21 @ 14:58  Pro Bnp 446

## 2022-08-21 NOTE — ED PROVIDER NOTE - HIV OFFER
[facility] ([facility]):
Previously Declined (within the last year)

## 2022-08-21 NOTE — ED PROVIDER NOTE - OBJECTIVE STATEMENT
HR dropped 46 as per Apple watch today intermittently.  BP was low today.  No cp, sob, n/v/d or fever.  Dizziness upon standing up.   cardio Romeo Gayle in [place]

## 2022-08-21 NOTE — ED PROVIDER NOTE - PROGRESS NOTE DETAILS
case judith Vega who will see patient tomorrow. case dw Ghazal NP for Brett Gayle All results were explained to patient and/or family and a copy of all available results given.  wife was present.

## 2022-08-22 LAB
ALBUMIN SERPL ELPH-MCNC: 3.5 G/DL — SIGNIFICANT CHANGE UP (ref 3.3–5)
ALP SERPL-CCNC: 84 U/L — SIGNIFICANT CHANGE UP (ref 30–120)
ALT FLD-CCNC: 32 U/L DA — SIGNIFICANT CHANGE UP (ref 10–60)
ANION GAP SERPL CALC-SCNC: 4 MMOL/L — LOW (ref 5–17)
AST SERPL-CCNC: 20 U/L — SIGNIFICANT CHANGE UP (ref 10–40)
BASOPHILS # BLD AUTO: 0.04 K/UL — SIGNIFICANT CHANGE UP (ref 0–0.2)
BASOPHILS NFR BLD AUTO: 0.5 % — SIGNIFICANT CHANGE UP (ref 0–2)
BILIRUB SERPL-MCNC: 0.6 MG/DL — SIGNIFICANT CHANGE UP (ref 0.2–1.2)
BUN SERPL-MCNC: 29 MG/DL — HIGH (ref 7–23)
CALCIUM SERPL-MCNC: 9.4 MG/DL — SIGNIFICANT CHANGE UP (ref 8.4–10.5)
CHLORIDE SERPL-SCNC: 101 MMOL/L — SIGNIFICANT CHANGE UP (ref 96–108)
CO2 SERPL-SCNC: 30 MMOL/L — SIGNIFICANT CHANGE UP (ref 22–31)
CREAT SERPL-MCNC: 1.16 MG/DL — SIGNIFICANT CHANGE UP (ref 0.5–1.3)
EGFR: 67 ML/MIN/1.73M2 — SIGNIFICANT CHANGE UP
EOSINOPHIL # BLD AUTO: 0.17 K/UL — SIGNIFICANT CHANGE UP (ref 0–0.5)
EOSINOPHIL NFR BLD AUTO: 2.1 % — SIGNIFICANT CHANGE UP (ref 0–6)
GLUCOSE SERPL-MCNC: 97 MG/DL — SIGNIFICANT CHANGE UP (ref 70–99)
HCT VFR BLD CALC: 42.1 % — SIGNIFICANT CHANGE UP (ref 39–50)
HCV AB S/CO SERPL IA: 0.05 S/CO — SIGNIFICANT CHANGE UP (ref 0–0.99)
HCV AB SERPL-IMP: SIGNIFICANT CHANGE UP
HGB BLD-MCNC: 14.5 G/DL — SIGNIFICANT CHANGE UP (ref 13–17)
IMM GRANULOCYTES NFR BLD AUTO: 0.1 % — SIGNIFICANT CHANGE UP (ref 0–1.5)
LYMPHOCYTES # BLD AUTO: 4.05 K/UL — HIGH (ref 1–3.3)
LYMPHOCYTES # BLD AUTO: 49.3 % — HIGH (ref 13–44)
MAGNESIUM SERPL-MCNC: 2.2 MG/DL — SIGNIFICANT CHANGE UP (ref 1.6–2.6)
MCHC RBC-ENTMCNC: 29.4 PG — SIGNIFICANT CHANGE UP (ref 27–34)
MCHC RBC-ENTMCNC: 34.4 GM/DL — SIGNIFICANT CHANGE UP (ref 32–36)
MCV RBC AUTO: 85.4 FL — SIGNIFICANT CHANGE UP (ref 80–100)
MONOCYTES # BLD AUTO: 0.55 K/UL — SIGNIFICANT CHANGE UP (ref 0–0.9)
MONOCYTES NFR BLD AUTO: 6.7 % — SIGNIFICANT CHANGE UP (ref 2–14)
NEUTROPHILS # BLD AUTO: 3.39 K/UL — SIGNIFICANT CHANGE UP (ref 1.8–7.4)
NEUTROPHILS NFR BLD AUTO: 41.3 % — LOW (ref 43–77)
NRBC # BLD: 0 /100 WBCS — SIGNIFICANT CHANGE UP (ref 0–0)
PHOSPHATE SERPL-MCNC: 3 MG/DL — SIGNIFICANT CHANGE UP (ref 2.5–4.5)
PLATELET # BLD AUTO: 149 K/UL — LOW (ref 150–400)
POTASSIUM SERPL-MCNC: 4 MMOL/L — SIGNIFICANT CHANGE UP (ref 3.5–5.3)
POTASSIUM SERPL-SCNC: 4 MMOL/L — SIGNIFICANT CHANGE UP (ref 3.5–5.3)
PROT SERPL-MCNC: 6.2 G/DL — SIGNIFICANT CHANGE UP (ref 6–8.3)
RBC # BLD: 4.93 M/UL — SIGNIFICANT CHANGE UP (ref 4.2–5.8)
RBC # FLD: 13.2 % — SIGNIFICANT CHANGE UP (ref 10.3–14.5)
SODIUM SERPL-SCNC: 135 MMOL/L — SIGNIFICANT CHANGE UP (ref 135–145)
WBC # BLD: 8.21 K/UL — SIGNIFICANT CHANGE UP (ref 3.8–10.5)
WBC # FLD AUTO: 8.21 K/UL — SIGNIFICANT CHANGE UP (ref 3.8–10.5)

## 2022-08-22 PROCEDURE — 99233 SBSQ HOSP IP/OBS HIGH 50: CPT

## 2022-08-22 RX ORDER — HYDRALAZINE HCL 50 MG
25 TABLET ORAL ONCE
Refills: 0 | Status: COMPLETED | OUTPATIENT
Start: 2022-08-22 | End: 2022-08-22

## 2022-08-22 RX ORDER — HYDRALAZINE HCL 50 MG
75 TABLET ORAL EVERY 8 HOURS
Refills: 0 | Status: DISCONTINUED | OUTPATIENT
Start: 2022-08-22 | End: 2022-08-23

## 2022-08-22 RX ORDER — HEPARIN SODIUM 5000 [USP'U]/ML
5000 INJECTION INTRAVENOUS; SUBCUTANEOUS EVERY 8 HOURS
Refills: 0 | Status: DISCONTINUED | OUTPATIENT
Start: 2022-08-22 | End: 2022-08-23

## 2022-08-22 RX ADMIN — HEPARIN SODIUM 5000 UNIT(S): 5000 INJECTION INTRAVENOUS; SUBCUTANEOUS at 21:08

## 2022-08-22 RX ADMIN — Medication 75 MILLIGRAM(S): at 21:09

## 2022-08-22 RX ADMIN — GABAPENTIN 200 MILLIGRAM(S): 400 CAPSULE ORAL at 21:09

## 2022-08-22 RX ADMIN — Medication 50 MILLIGRAM(S): at 06:32

## 2022-08-22 RX ADMIN — HEPARIN SODIUM 5000 UNIT(S): 5000 INJECTION INTRAVENOUS; SUBCUTANEOUS at 08:52

## 2022-08-22 RX ADMIN — GABAPENTIN 200 MILLIGRAM(S): 400 CAPSULE ORAL at 06:32

## 2022-08-22 RX ADMIN — GABAPENTIN 200 MILLIGRAM(S): 400 CAPSULE ORAL at 13:26

## 2022-08-22 RX ADMIN — FAMOTIDINE 20 MILLIGRAM(S): 10 INJECTION INTRAVENOUS at 13:27

## 2022-08-22 RX ADMIN — HEPARIN SODIUM 5000 UNIT(S): 5000 INJECTION INTRAVENOUS; SUBCUTANEOUS at 13:40

## 2022-08-22 RX ADMIN — SERTRALINE 100 MILLIGRAM(S): 25 TABLET, FILM COATED ORAL at 13:26

## 2022-08-22 RX ADMIN — Medication 25 MILLIGRAM(S): at 15:32

## 2022-08-22 RX ADMIN — AMLODIPINE BESYLATE 5 MILLIGRAM(S): 2.5 TABLET ORAL at 06:32

## 2022-08-22 RX ADMIN — Medication 50 MILLIGRAM(S): at 13:26

## 2022-08-22 NOTE — DIETITIAN INITIAL EVALUATION ADULT - PERTINENT LABORATORY DATA
08-22    135  |  101  |  29<H>  ----------------------------<  97  4.0   |  30  |  1.16    Ca    9.4      22 Aug 2022 06:35  Phos  3.0     08-22  Mg     2.2     08-22    TPro  6.2  /  Alb  3.5  /  TBili  0.6  /  DBili  x   /  AST  20  /  ALT  32  /  AlkPhos  84  08-22

## 2022-08-22 NOTE — DIETITIAN INITIAL EVALUATION ADULT - ORAL INTAKE PTA/DIET HISTORY
no added salt diet; wife does cooking, usually consumes 3x meals per day  B: oatmeal with fresh fruit, almonds; coffee or tea  L: cottage cheese with fruit, chicken with potatoes and vegetables  D: variable, spaghetti with meatballs, steak, hamburger, chicken (protein/starch/veg)

## 2022-08-22 NOTE — PROGRESS NOTE ADULT - TIME BILLING
Note written by attending, see above.  Time spent: 37min. More than 50% of the visit was spent counseling the patient and pt's wife on medical condition - Note written by attending, see above.  Time spent: 37min. More than 50% of the visit was spent counseling the patient and pt's wife on medical condition - hypotension, bradycardia, hyponatremia, RADHA and potential etiologies for all of these conditions.

## 2022-08-22 NOTE — CONSULT NOTE ADULT - SUBJECTIVE AND OBJECTIVE BOX
PULMONARY CONSULT NOTE      MARIALUISA MONTGOMERY  MRN-[id number]    Patient is a 71y old  Male who presents with a chief complaint of symptomatic bradycardia (21 Aug 2022 19:20)      HISTORY OF PRESENT ILLNESS:  Reason for Admission: symptomatic bradycardia  History of Present Illness:   70yo M w/ PMH of CLL / SLL (not on treatment), hx of Renal Tumor (s/p Partial L Nephrectomy), HTN, HLD, hx of ascending aorta dissection s/p repair 2013, p/w lightheadedness, bradycardia and hypotension. Pt states that his BP has been noted to be lower and his norvasc was decreased from 10mg to 5mg recently by a nephrologist. Subsequently, pt noticed his HR was going down to the 40s and SBP in the 80s and he discussed with his cardiologist and on 8/18 his metoprolol was decreased from 37.5mg / 25mg / 37.5mg that he was taking q8h at home to just 25mg BID. Today, pt checked his BP after dinner and noticed that his BP was 74/47 and his HR was in the 40s. He felt a little lightheaded and came to the ER. Of note, pt states a week ago pt had a lightheaded spell after standing up quickly and he lost balance and hit his head against a screen door. Denies fainting during that episode. Did not have a laceration or other visible head trauma. Pt has not had headaches, vision changes, focal weakness, or changes in speech. Denies fever, chills, cough, nausea, vomiting, abd pain, dysuria, urinary frequency/urgency, diarrhea. Of note, pt eats a strict low sodium diet.   MEDICATIONS  (STANDING):  amLODIPine   Tablet 5 milliGRAM(s) Oral daily  famotidine    Tablet 20 milliGRAM(s) Oral daily  gabapentin 200 milliGRAM(s) Oral three times a day  heparin   Injectable 5000 Unit(s) SubCutaneous every 8 hours  hydrALAZINE 50 milliGRAM(s) Oral three times a day  sertraline 100 milliGRAM(s) Oral daily      MEDICATIONS  (PRN):  acetaminophen     Tablet .. 650 milliGRAM(s) Oral every 6 hours PRN Temp greater or equal to 38C (100.4F), Mild Pain (1 - 3)      Allergies    Methylate (Rash)    Intolerances    Keflex (Stomach Upset)      PAST MEDICAL & SURGICAL HISTORY:  Hypertension      Central vein occlusion of retina  2015      Aortic dissection, thoracic      Anxiety      GERD (gastroesophageal reflux disease)      Melanoma in situ  right arm; 2014      Neuroma  right groin s/p aortic aneurysm repair 2013      Other specified disorders of kidney and ureter      Polycythemia      Central retinal vein thrombosis  right      CLL (chronic lymphocytic leukemia)      Small lymphocytic lymphoma      S/P aortic dissection repair      Melanoma in situ  Excision of menaloma of right arm; 2014      History of tonsillectomy and adenoidectomy  at age 3      S/P cholecystectomy  2017      History of loop recorder  medtronic [id number] sn [id number]      FAMILY HISTORY:  Family history of hypertension in mother.     Social History:  · Substance use	Yes  · Alcohol use	none  · Substance use	none  · Tobacco use	never smoker  · Social History (marital status, living situation, occupation, and sexual history)	, lives with wife, walks without assist device.     Had Moderna COVID vaccine x 4 ; the second booster was on 4/6/22.     Tobacco Screening:  · Core Measure Site	No  · Has the patient used tobacco in the past 30 days?	No    Risk Assessment:    Present on Admission:  Deep Venous Thrombosis	no  Pulmonary Embolus	no     HIV Screening:  · In accordance with [place] State law, we offer every patient who comes to our ED an HIV test. Would you like to be tested today?	Opt out      FAMILY HISTORY:  Family history of hypertension in mother        SOCIAL HISTORY  Smoking History:     REVIEW OF SYSTEMS:    REVIEW OF SYSTEMS      General:	    Skin/Breast:  	  Ophthalmologic:  	  ENMT:	    Respiratory and Thorax:  	  Cardiovascular:	    Gastrointestinal:	    Genitourinary:	    Musculoskeletal:	    Neurological:	    Psychiatric:	    Hematology/Lymphatics:	    Endocrine:	    Allergic/Immunologic:	    Vital Signs Last 24 Hrs  T(C): 36.6 (22 Aug 2022 06:00), Max: 36.9 (21 Aug 2022 14:43)  T(F): 97.9 (22 Aug 2022 06:00), Max: 98.4 (21 Aug 2022 14:43)  HR: 73 (22 Aug 2022 06:00) (52 - 78)  BP: 132/95 (22 Aug 2022 04:00) (117/71 - 150/118)  BP(mean): 107 (22 Aug 2022 04:00) (107 - 129)  RR: 13 (22 Aug 2022 06:00) (7 - 17)  SpO2: 100% (22 Aug 2022 06:00) (98% - 100%)    Parameters below as of 21 Aug 2022 21:45  Patient On (Oxygen Delivery Method): room air        PHYSICAL EXAMINATION:  PHYSICAL EXAM:      Constitutional:    Eyes:    ENMT:    Neck:    Breasts:    Back:    Respiratory:    Cardiovascular:    Gastrointestinal:    Genitourinary:    Rectal:    Extremities:    Vascular:    Neurological:    Skin:    Lymph Nodes:    Musculoskeletal:    Psychiatric:    weakness        LABS:                        14.5   8.21  )-----------( 149      ( 22 Aug 2022 06:35 )             42.1     08-21    133<L>  |  99  |  38<H>  ----------------------------<  123<H>  4.4   |  28  |  1.73<H>    Ca    9.2      21 Aug 2022 19:26    TPro  6.2  /  Alb  3.7  /  TBili  0.6  /  DBili  x   /  AST  21  /  ALT  32  /  AlkPhos  89  08-21      heart s1s2  lung dec bS  abd soft  head nc  ioana  head at  verbal          Serum Pro-Brain Natriuretic Peptide: 446 pg/mL (08-21-22 @ 14:58)          MICROBIOLOGY:    RADIOLOGY & ADDITIONAL STUDIES:      ACC: [id number] EXAM:  CT BRAIN                          PROCEDURE DATE:  08/21/2022          INTERPRETATION:  CLINICAL INFORMATION:  r/out ICH recent fall, head strike    TECHNIQUE:  Axial CT images were acquired through the head.  Intravenous contrast: None  Two-dimensional reformats were generated.    COMPARISON STUDY: Brain MRI 4/27/2017. CT head 3/27/2017    FINDINGS:    There is no CT evidence of acute intracranial hemorrhage, mass effect,   midline shift, or acute, large territorial infarct.  The ventricles and   sulci are prominent compatible with age-appropriate generalized cerebral   volume loss. Minimal patchy periventricular white matter heterogeneity is   nonspecific, although likely due to chronic microangiopathy. The basal   cisterns are patent.    The mastoid air cells and middle ear cavities are grossly clear.   Scattered bilateral ethmoid air cell opacities, the visualized paranasal   sinuses are otherwise clear.    The calvarium and skull base are grossly intact.    IMPRESSION:  No acute intracranial hemorrhage, mass effect, or acute osseous fracture.    --- End of Report ---            DANIKA ESPAÑA MD; Attending Radiologist  This document has been electronically signed. Aug 22 2022  5:15AM
CARDIOLOGY CONSULT NOTE    Patient is a 71y Male with a known history of :    HPI:  70yo M w/ PMH of CLL / SLL (not on treatment), hx of Renal Tumor (s/p Partial L Nephrectomy), HTN, HLD, hx of ascending aorta dissection s/p repair 2013, p/w lightheadedness, bradycardia and hypotension. Pt states that his BP has been noted to be lower and his norvasc was decreased from 10mg to 5mg recently by a nephrologist. Subsequently, pt noticed his HR was going down to the 40s and SBP in the 80s and he discussed with his cardiologist and on 8/18 his metoprolol was decreased from 37.5mg / 25mg / 37.5mg that he was taking q8h at home to just 25mg BID. Today, pt checked his BP after dinner and noticed that his BP was 74/47 and his HR was in the 40s. He felt a little lightheaded and came to the ER. Of note, pt states a week ago pt had a lightheaded spell after standing up quickly and he lost balance and hit his head against a screen door. Denies fainting during that episode. Did not have a laceration or other visible head trauma. Pt has not had headaches, vision changes, focal weakness, or changes in speech. Denies fever, chills, cough, nausea, vomiting, abd pain, dysuria, urinary frequency/urgency, diarrhea. Of note, pt eats a strict low sodium diet.     In the ER, pt's loop recorder was interrogated and found that pt had been having episodes of HR in the low-30s, though it appears to have been sinus ioana on the loop recorder strip. Cardio (Silvia) was consulted by the ER and will see pt tomorrow. Pt states loop recorder was implanted > 2years ago and he believes it was initially placed to monitor for afib, which has not been found.    Pt was given 1L NS in the ER. Orthostatic VS in the ER were normal.   BP/HR normalized in the ER. Pt was admitted for monitoring and potential transfer for EP eval depending on his hospital course and how his tele is off the metoprolol.     EKG (personally reviewed): SR at 66bpm, 1st degree AVB  CXR (personally reviewed): no consolidations or effusions (21 Aug 2022 19:20)      REVIEW OF SYSTEMS:    CONSTITUTIONAL: No fever, weight loss, or fatigue  EYES: No eye pain, visual disturbances, or discharge  ENMT:  No difficulty hearing, tinnitus, vertigo; No sinus or throat pain  NECK: No pain or stiffness  BREASTS: No pain, masses, or nipple discharge  RESPIRATORY: No cough, wheezing, chills or hemoptysis; No shortness of breath  CARDIOVASCULAR: No chest pain, palpitations, dizziness, or leg swelling  GASTROINTESTINAL: No abdominal or epigastric pain. No nausea, vomiting, or hematemesis; No diarrhea or constipation. No melena or hematochezia.  GENITOURINARY: No dysuria, frequency, hematuria, or incontinence  NEUROLOGICAL: No headaches, memory loss, loss of strength, numbness, or tremors  SKIN: No itching, burning, rashes, or lesions   LYMPH NODES: No enlarged glands  ENDOCRINE: No heat or cold intolerance; No hair loss  MUSCULOSKELETAL: No joint pain or swelling; No muscle, back, or extremity pain  PSYCHIATRIC: No depression, anxiety, mood swings, or difficulty sleeping  HEME/LYMPH: No easy bruising, or bleeding gums  ALLERGY AND IMMUNOLOGIC: No hives or eczema    MEDICATIONS  (STANDING):  amLODIPine   Tablet 5 milliGRAM(s) Oral daily  famotidine    Tablet 20 milliGRAM(s) Oral daily  gabapentin 200 milliGRAM(s) Oral three times a day  heparin   Injectable 5000 Unit(s) SubCutaneous every 8 hours  hydrALAZINE 50 milliGRAM(s) Oral three times a day  sertraline 100 milliGRAM(s) Oral daily    MEDICATIONS  (PRN):  acetaminophen     Tablet .. 650 milliGRAM(s) Oral every 6 hours PRN Temp greater or equal to 38C (100.4F), Mild Pain (1 - 3)      ALLERGIES: Methylate (Rash)      FAMILY HISTORY:  Family history of hypertension in mother        Social History:  Alochol:   Smoking:   Drug Use:   Marital Status:     I&O's Detail    21 Aug 2022 07:01  -  22 Aug 2022 07:00  --------------------------------------------------------  IN:    Lactated Ringers: 560 mL  Total IN: 560 mL    OUT:    Voided (mL): 1275 mL  Total OUT: 1275 mL    Total NET: -715 mL      22 Aug 2022 07:01  -  22 Aug 2022 09:11  --------------------------------------------------------  IN:    Oral Fluid: 120 mL  Total IN: 120 mL    OUT:  Total OUT: 0 mL    Total NET: 120 mL          PHYSICAL EXAMINATION:  -----------------------------  T(C): 36.8 (08-22-22 @ 08:16), Max: 36.9 (08-21-22 @ 14:43)  HR: 69 (08-22-22 @ 08:00) (52 - 78)  BP: 144/91 (08-22-22 @ 08:00) (117/71 - 150/118)  RR: 12 (08-22-22 @ 08:00) (7 - 17)  SpO2: 100% (08-22-22 @ 08:00) (98% - 100%)  Wt(kg): --    08-21 @ 07:01  -  08-22 @ 07:00  --------------------------------------------------------  IN:    Lactated Ringers: 560 mL  Total IN: 560 mL    OUT:    Voided (mL): 1275 mL  Total OUT: 1275 mL    Total NET: -715 mL      08-22 @ 07:01  -  08-22 @ 09:11  --------------------------------------------------------  IN:    Oral Fluid: 120 mL  Total IN: 120 mL    OUT:  Total OUT: 0 mL    Total NET: 120 mL        Height (cm): 175.3 (08-21 @ 14:43)  Weight (kg): 72.9 (08-21 @ 14:43)  BMI (kg/m2): 23.7 (08-21 @ 14:43)  BSA (m2): 1.88 (08-21 @ 14:43)    Constitutional: well developed, normal appearance, well groomed, well nourished, no deformities and no acute distress.   Eyes: the conjunctiva exhibited no abnormalities and the eyelids demonstrated no xanthelasmas.   HEENT: normal oral mucosa, no oral pallor and no oral cyanosis.   Neck: normal jugular venous A waves present, normal jugular venous V waves present and no jugular venous luevano A waves.   Pulmonary: no respiratory distress, normal respiratory rhythm and effort, no accessory muscle use and lungs were clear to auscultation bilaterally.   Cardiovascular: heart rate and rhythm were normal, normal S1 and S2 and no murmur, gallop, rub, heave or thrill are present.   Musculoskeletal: the gait could not be assessed.   Extremities: no clubbing of the fingernails, no localized cyanosis, no petechial hemorrhages and no ischemic changes.   Skin: normal skin color and pigmentation, no rash, no venous stasis, no skin lesions, no skin ulcer and no xanthoma was observed.   Psychiatric: oriented to person, place, and time, the affect was normal, the mood was normal and not feeling anxious.     LABS:   --------  08-22    135  |  101  |  29<H>  ----------------------------<  97  4.0   |  30  |  1.16    Ca    9.4      22 Aug 2022 06:35  Phos  3.0     08-22  Mg     2.2     08-22    TPro  6.2  /  Alb  3.5  /  TBili  0.6  /  DBili  x   /  AST  20  /  ALT  32  /  AlkPhos  84  08-22                         14.5   8.21  )-----------( 149      ( 22 Aug 2022 06:35 )             42.1       08-21 @ 14:58 BNP: 446 pg/mL              RADIOLOGY:  -----------------    < from: Transthoracic Echocardiogram (08.19.21 @ 10:36) >    Patient name: MARIALUISA MONTGOMERY  YOB: 1950   Age: 70 (M)   MR#: [id number]  Study Date: 8/19/2021  Location: O/PSonographer: Yudi Lee RUST  Study quality: Technically good  Referring Physician: Brett Gayle MD, PhD  Blood Pressure: 141/84 mmHg  Height: 175 cm  Weight: 75 kg  BSA: 1.9 m2  ------------------------------------------------------------------------  PROCEDURE: Transthoracic echocardiogram with 2-D, M-Mode  and complete spectral and color flow Doppler.  INDICATION: Essential (primary) hypertension (I10)  ------------------------------------------------------------------------  DIMENSIONS:  Dimensions:     Normal Values:  LA:     4.7 cm    2.0 - 4.0 cm  Ao:     3.3 cm    2.0 - 3.8 cm  SEPTUM: 0.9 cm    0.6 - 1.2 cm  PWT:1.0 cm    0.6 - 1.1 cm  LVIDd:  4.3 cm    3.0 - 5.6 cm  LVIDs:  2.9 cm    1.8 - 4.0 cm  Derived Variables:  LVMI: 70 g/m2  RWT: 0.46  Fractional short: 33 %  Ejection Fraction (Visual Estimate): 55 %  ------------------------------------------------------------------------  OBSERVATIONS:  Mitral Valve: Mitral annular calcification and calcified  mitral leaflets with normal diastolic opening. Mild mitral  regurgitation.  Aortic Root: The aortic root measures 3.3 cm at the Sinus  of Valsalva. Graftnoted in proximal ascending aorta.  Aortic Valve: Calcified trileaflet aortic valve with normal  opening. Mild aortic regurgitation.  Left Atrium: Moderately dilated left atrium.  LA volume  index = 43 cc/m2.  Left Ventricle: Normal left ventricular systolic function.  No segmental wall motion abnormalities. Normal left  ventricular internal dimensions and wall thicknesses.  (DT:155 ms).  Right Heart: Normal right atrium. The right ventricle is  not well visualized; grossly normal right ventricular  systolic function. Normal tricuspid valve. Mild tricuspid  regurgitation. Pulmonic valve not well visualized.  Pericardium/PleuraNormal pericardium with no pericardial  effusion.  Hemodynamic: Estimated right ventricular systolic pressure  equals 36 mm Hg, assuming right atrial pressure equals 10  mm Hg, consistent with borderline pulmonary hypertension.  ------------------------------------------------------------------------  CONCLUSIONS:  1. Mitral annular calcification and calcified mitral  leaflets with normal diastolic opening. Mild mitral  regurgitation.  2. Calcified trileaflet aortic valve with normal opening.  Mild aortic regurgitation.  3. The aortic root measures 3.3 cm at the Sinus of  Valsalva. Graft noted in proximal ascending aorta.  4. Moderately dilated left atrium.  LA volume index = 43  cc/m2.  5. Normal left ventricular internal dimensions and wall  thicknesses.  6. Normal left ventricular systolic function. No segmental  wall motion abnormalities.  7. The right ventricle is not well visualized; grossly  normal right ventricular systolic function.  8. Normal tricuspid valve. Mild tricuspid regurgitation.  9. Estimated pulmonary artery systolic pressure equals 36  mm Hg, assuming right atrial pressure equals 10mm Hg,  consistent with borderline pulmonary hypertension.  *** Compared with echocardiogram of 3/23/2018, no  significant changes noted.  ------------------------------------------------------------------------  Confirmed on  8/20/2021 - 19:57:30 Sung Gayle MD, [facility],  SIDDHARTH, [facility]  ------------------------------------------------------------------------    < end of copied text >      ECG: NSR at 66/minute, first degree A-V block

## 2022-08-22 NOTE — CONSULT NOTE ADULT - ASSESSMENT
72y/o seen at [facility] ICU  History HTN, high cholesterol, orthostatic hypotension, CLL, anxiety, GERD, polycythemia, right central retinal vein thrombosis  S/P thoracic aortic dissection surgery  S/P left partial nephrectomy for cancer  S/P right arm melanoma surgery  S/P gall-bladder surgery  S/P T&A    Admitted for yesterday upon getting up felt lightheaded about 1-2 pm  He checked his apple watch and the heart rate was in the 40's  He also noticed that his blood pressure was low  He states that on 8/18/22 he had a fall that he is unsure if it was mechanical  Patient had LINQ-Celergo for palpitations  Tracings reveal sinus bradycardia in the low 30's usually while sleeping  On 8/18/22 tracing with sinus bradycardia and ?broderick rhythm at about 33/minute  He also states that his Metoprolol recently was lowered     Plan:  - Will stop Metoprolol  - Continue Amlodipine-5mg OD                  Hydralazine-100mg TID                  Niaspan ER-500mg OD  - 8/19/21 Echocardiogram with normal EF-see above report  - Since being in the hospital, no significant arrhythmias documented  - I am waiting for call back from Dr. Romeo Gayle
72yo M w/ PMH of CLL / SLL (not on treatment), hx of Renal Tumor (s/p Partial L Nephrectomy), HTN, HLD, hx of ascending aorta dissection s/p repair 2013, p/w lightheadedness, bradycardia and hypotension    RADHA  Hypotension  HLD  hx of HTN  CLL  AAA  OP  OA  Bradycardia

## 2022-08-22 NOTE — DIETITIAN INITIAL EVALUATION ADULT - OTHER INFO
Per H&P, pt is a "72yo M w/ PMH of CLL / SLL (not on treatment), hx of Renal Tumor (s/p Partial L Nephrectomy), HTN, HLD, hx of ascending aorta dissection s/p repair 2013, p/w lightheadedness, bradycardia and hypotension. Pt states that his BP has been noted to be lower and his norvasc was decreased from 10mg to 5mg recently by a nephrologist. Subsequently, pt noticed his HR was going down to the 40s and SBP in the 80s and he discussed with his cardiologist and on 8/18 his metoprolol was decreased from 37.5mg / 25mg / 37.5mg that he was taking q8h at home to just 25mg BID. Today, pt checked his BP after dinner and noticed that his BP was 74/47 and his HR was in the 40s. He felt a little lightheaded and came to the ER. Of note, pt states a week ago pt had a lightheaded spell after standing up quickly and he lost balance and hit his head against a screen door. Denies fainting during that episode. Did not have a laceration or other visible head trauma. Pt has not had headaches, vision changes, focal weakness, or changes in speech. Denies fever, chills, cough, nausea, vomiting, abd pain, dysuria, urinary frequency/urgency, diarrhea. Of note, pt eats a strict low sodium diet."    Pt seen for nutrition assessment secondary to [facility] admission.  Pt admitted with bradycardia, hypotension and hyponatremia (now resolved); noted recent medication change.  Lives at home with spouse who does meal preparation, consumes no added salt diet; appears well balanced and generally healthful.  Diet liberalized to regular while in house.  Pt reports good appetite and intake; meal preferences to be discussed daily to optimize po intake and tolerance.  Reports UBW ~160#, consistent with admission wt.  Endorses gradual/intentional wt loss during last few years secondary to dietary modifications.  BMs regular PTA.  Denies chewing or swallowing difficulties.  Skin intact.  RD to follow up and will continue to monitor pt's nutrition status.

## 2022-08-22 NOTE — DIETITIAN INITIAL EVALUATION ADULT - PERTINENT MEDS FT
MEDICATIONS  (STANDING):  amLODIPine   Tablet 5 milliGRAM(s) Oral daily  famotidine    Tablet 20 milliGRAM(s) Oral daily  gabapentin 200 milliGRAM(s) Oral three times a day  heparin   Injectable 5000 Unit(s) SubCutaneous every 8 hours  hydrALAZINE 50 milliGRAM(s) Oral three times a day  sertraline 100 milliGRAM(s) Oral daily    MEDICATIONS  (PRN):  acetaminophen     Tablet .. 650 milliGRAM(s) Oral every 6 hours PRN Temp greater or equal to 38C (100.4F), Mild Pain (1 - 3)

## 2022-08-22 NOTE — DIETITIAN INITIAL EVALUATION ADULT - NSICDXPASTSURGICALHX_GEN_ALL_CORE_FT
PAST SURGICAL HISTORY:  History of loop recorder Sangh[employer] [id number] sn [id number]    History of tonsillectomy and adenoidectomy at age 3    Melanoma in situ Excision of menaloma of right arm; 2014    S/P aortic dissection repair     S/P cholecystectomy 2017

## 2022-08-23 ENCOUNTER — TRANSCRIPTION ENCOUNTER (OUTPATIENT)
Age: 72
End: 2022-08-23

## 2022-08-23 VITALS
DIASTOLIC BLOOD PRESSURE: 90 MMHG | HEART RATE: 85 BPM | OXYGEN SATURATION: 100 % | SYSTOLIC BLOOD PRESSURE: 141 MMHG | TEMPERATURE: 98 F

## 2022-08-23 LAB
ANION GAP SERPL CALC-SCNC: 4 MMOL/L — LOW (ref 5–17)
BUN SERPL-MCNC: 28 MG/DL — HIGH (ref 7–23)
CALCIUM SERPL-MCNC: 9.5 MG/DL — SIGNIFICANT CHANGE UP (ref 8.4–10.5)
CHLORIDE SERPL-SCNC: 98 MMOL/L — SIGNIFICANT CHANGE UP (ref 96–108)
CO2 SERPL-SCNC: 30 MMOL/L — SIGNIFICANT CHANGE UP (ref 22–31)
CREAT SERPL-MCNC: 1.26 MG/DL — SIGNIFICANT CHANGE UP (ref 0.5–1.3)
EGFR: 60 ML/MIN/1.73M2 — SIGNIFICANT CHANGE UP
GLUCOSE SERPL-MCNC: 98 MG/DL — SIGNIFICANT CHANGE UP (ref 70–99)
HCT VFR BLD CALC: 40 % — SIGNIFICANT CHANGE UP (ref 39–50)
HGB BLD-MCNC: 13.7 G/DL — SIGNIFICANT CHANGE UP (ref 13–17)
MAGNESIUM SERPL-MCNC: 1.9 MG/DL — SIGNIFICANT CHANGE UP (ref 1.6–2.6)
MCHC RBC-ENTMCNC: 29.3 PG — SIGNIFICANT CHANGE UP (ref 27–34)
MCHC RBC-ENTMCNC: 34.3 GM/DL — SIGNIFICANT CHANGE UP (ref 32–36)
MCV RBC AUTO: 85.7 FL — SIGNIFICANT CHANGE UP (ref 80–100)
NRBC # BLD: 0 /100 WBCS — SIGNIFICANT CHANGE UP (ref 0–0)
PLATELET # BLD AUTO: 143 K/UL — LOW (ref 150–400)
POTASSIUM SERPL-MCNC: 4 MMOL/L — SIGNIFICANT CHANGE UP (ref 3.5–5.3)
POTASSIUM SERPL-SCNC: 4 MMOL/L — SIGNIFICANT CHANGE UP (ref 3.5–5.3)
RBC # BLD: 4.67 M/UL — SIGNIFICANT CHANGE UP (ref 4.2–5.8)
RBC # FLD: 12.9 % — SIGNIFICANT CHANGE UP (ref 10.3–14.5)
SODIUM SERPL-SCNC: 132 MMOL/L — LOW (ref 135–145)
WBC # BLD: 7.57 K/UL — SIGNIFICANT CHANGE UP (ref 3.8–10.5)
WBC # FLD AUTO: 7.57 K/UL — SIGNIFICANT CHANGE UP (ref 3.8–10.5)

## 2022-08-23 PROCEDURE — 85025 COMPLETE CBC W/AUTO DIFF WBC: CPT

## 2022-08-23 PROCEDURE — 36415 COLL VENOUS BLD VENIPUNCTURE: CPT

## 2022-08-23 PROCEDURE — 84100 ASSAY OF PHOSPHORUS: CPT

## 2022-08-23 PROCEDURE — 86803 HEPATITIS C AB TEST: CPT

## 2022-08-23 PROCEDURE — 83735 ASSAY OF MAGNESIUM: CPT

## 2022-08-23 PROCEDURE — 87635 SARS-COV-2 COVID-19 AMP PRB: CPT

## 2022-08-23 PROCEDURE — 85027 COMPLETE CBC AUTOMATED: CPT

## 2022-08-23 PROCEDURE — 70450 CT HEAD/BRAIN W/O DYE: CPT

## 2022-08-23 PROCEDURE — 99285 EMERGENCY DEPT VISIT HI MDM: CPT

## 2022-08-23 PROCEDURE — 83880 ASSAY OF NATRIURETIC PEPTIDE: CPT

## 2022-08-23 PROCEDURE — 96360 HYDRATION IV INFUSION INIT: CPT

## 2022-08-23 PROCEDURE — 84484 ASSAY OF TROPONIN QUANT: CPT

## 2022-08-23 PROCEDURE — 80053 COMPREHEN METABOLIC PANEL: CPT

## 2022-08-23 PROCEDURE — 96361 HYDRATE IV INFUSION ADD-ON: CPT

## 2022-08-23 PROCEDURE — 80048 BASIC METABOLIC PNL TOTAL CA: CPT

## 2022-08-23 PROCEDURE — 99233 SBSQ HOSP IP/OBS HIGH 50: CPT

## 2022-08-23 PROCEDURE — 93005 ELECTROCARDIOGRAM TRACING: CPT

## 2022-08-23 PROCEDURE — 71046 X-RAY EXAM CHEST 2 VIEWS: CPT

## 2022-08-23 RX ORDER — AMLODIPINE BESYLATE 2.5 MG/1
5 TABLET ORAL ONCE
Refills: 0 | Status: COMPLETED | OUTPATIENT
Start: 2022-08-23 | End: 2022-08-23

## 2022-08-23 RX ORDER — METOPROLOL TARTRATE 50 MG
1 TABLET ORAL
Qty: 0 | Refills: 0 | DISCHARGE

## 2022-08-23 RX ORDER — HYDRALAZINE HCL 50 MG
100 TABLET ORAL EVERY 8 HOURS
Refills: 0 | Status: DISCONTINUED | OUTPATIENT
Start: 2022-08-23 | End: 2022-08-23

## 2022-08-23 RX ORDER — POTASSIUM CHLORIDE 20 MEQ
1 PACKET (EA) ORAL
Qty: 0 | Refills: 0 | DISCHARGE

## 2022-08-23 RX ORDER — HYDRALAZINE HCL 50 MG
1 TABLET ORAL
Qty: 0 | Refills: 0 | DISCHARGE

## 2022-08-23 RX ORDER — AMLODIPINE BESYLATE 2.5 MG/1
1 TABLET ORAL
Qty: 0 | Refills: 0 | DISCHARGE
Start: 2022-08-23

## 2022-08-23 RX ORDER — AMLODIPINE BESYLATE 2.5 MG/1
1 TABLET ORAL
Qty: 0 | Refills: 0 | DISCHARGE

## 2022-08-23 RX ORDER — HYDRALAZINE HCL 50 MG
1 TABLET ORAL
Qty: 0 | Refills: 0 | DISCHARGE
Start: 2022-08-23

## 2022-08-23 RX ORDER — HYDRALAZINE HCL 50 MG
25 TABLET ORAL ONCE
Refills: 0 | Status: COMPLETED | OUTPATIENT
Start: 2022-08-23 | End: 2022-08-23

## 2022-08-23 RX ADMIN — GABAPENTIN 200 MILLIGRAM(S): 400 CAPSULE ORAL at 05:10

## 2022-08-23 RX ADMIN — Medication 100 MILLIGRAM(S): at 13:02

## 2022-08-23 RX ADMIN — HEPARIN SODIUM 5000 UNIT(S): 5000 INJECTION INTRAVENOUS; SUBCUTANEOUS at 05:10

## 2022-08-23 RX ADMIN — Medication 25 MILLIGRAM(S): at 08:55

## 2022-08-23 RX ADMIN — HEPARIN SODIUM 5000 UNIT(S): 5000 INJECTION INTRAVENOUS; SUBCUTANEOUS at 13:02

## 2022-08-23 RX ADMIN — AMLODIPINE BESYLATE 5 MILLIGRAM(S): 2.5 TABLET ORAL at 10:19

## 2022-08-23 RX ADMIN — Medication 75 MILLIGRAM(S): at 05:10

## 2022-08-23 RX ADMIN — SERTRALINE 100 MILLIGRAM(S): 25 TABLET, FILM COATED ORAL at 13:00

## 2022-08-23 RX ADMIN — FAMOTIDINE 20 MILLIGRAM(S): 10 INJECTION INTRAVENOUS at 13:00

## 2022-08-23 RX ADMIN — AMLODIPINE BESYLATE 5 MILLIGRAM(S): 2.5 TABLET ORAL at 05:11

## 2022-08-23 RX ADMIN — GABAPENTIN 200 MILLIGRAM(S): 400 CAPSULE ORAL at 13:00

## 2022-08-23 NOTE — PROGRESS NOTE ADULT - SUBJECTIVE AND OBJECTIVE BOX
Chief Complaint: Presyncope    Interval Events: No events overnight.    Review of Systems:  General: No fevers, chills, weight gain  Skin: No rashes, color changes  Cardiovascular: No chest pain, orthopnea  Respiratory: No shortness of breath, cough  Gastrointestinal: No nausea, abdominal pain  Genitourinary: No incontinence, pain with urination  Musculoskeletal: No pain, swelling, decreased range of motion  Neurological: No headache, weakness  Psychiatric: No depression, anxiety  Endocrine: No weight gain, increased thirst  All other systems are comprehensively negative.    Physical Exam:  Vitals:        Vital Signs Last 24 Hrs  T(C): 36.6 (23 Aug 2022 08:30), Max: 36.9 (23 Aug 2022 00:17)  T(F): 97.8 (23 Aug 2022 08:30), Max: 98.4 (23 Aug 2022 00:17)  HR: 72 (23 Aug 2022 08:00) (66 - 84)  BP: 168/96 (23 Aug 2022 08:00) (110/80 - 168/96)  BP(mean): 116 (23 Aug 2022 08:00) (89 - 116)  RR: 18 (23 Aug 2022 10:00) (12 - 22)  SpO2: 100% (23 Aug 2022 10:00) (96% - 100%)  Parameters below as of 23 Aug 2022 10:00  Patient On (Oxygen Delivery Method): room air  General: NAD  HEENT: MMM  Neck: No JVD, no carotid bruit  Lungs: CTAB  CV: RRR, nl S1/S2, no M/R/G  Abdomen: S/NT/ND, +BS  Extremities: No LE edema, no cyanosis  Neuro: AAOx3, non-focal  Skin: No rash    Labs:                        13.7   7.57  )-----------( 143      ( 23 Aug 2022 06:00 )             40.0     08-23    132<L>  |  98  |  28<H>  ----------------------------<  98  4.0   |  30  |  1.26    Ca    9.5      23 Aug 2022 06:00  Phos  3.0     08-22  Mg     1.9     08-23    TPro  6.2  /  Alb  3.5  /  TBili  0.6  /  DBili  x   /  AST  20  /  ALT  32  /  AlkPhos  84  08-22            Telemetry: Sinus rhythm
Patient seen and examined  feel well  denies any complaints  tele noted: HR better  Bp up at 165  Review of Systems:  General:denies fever chills, headache, weakness  HEENT: denies blurry vision,diffculty swallowing, difficulty hearing, tinnitus  Cardiovascular: denies chest pain  ,palpitations  Pulmonary:denies shortness of breath, cough, wheezing, hemoptysis  Gastrointestinal: denies abdominal pain, constipation, diarrhea,nausea , vomiting, hematochezia  : denies hematuria, dysuria, or incontinence  Neurological: denies weakness, numbness , tingling, dizziness, tremors  MSK: denies muscle pain, difficulty ambulating, swelling, back pain  skin: denies skin rash, itching, burning, or  skin lesions  Psychiatrical: denies mood disturbances, anxierty, feeling depressed, depression , or difficulty sleeping    Objective:  Vitals  T(C): 36.6 (08-23-22 @ 08:30), Max: 36.9 (08-23-22 @ 00:17)  HR: 72 (08-23-22 @ 08:00) (66 - 84)  BP: 168/96 (08-23-22 @ 08:00) (110/80 - 168/96)  RR: 12 (08-23-22 @ 08:00) (12 - 22)  SpO2: 99% (08-23-22 @ 08:00) (96% - 100%)    Physical Exam:  General: comfortable, no acute distress, well nourished  HEENT: Atraumatic, no LAD, trachea midline, PERRLA  Cardiovascular: normal s1s2, no murmurs, gallops or fricition rubs  Pulmonary: clear to ausculation Bilaterally, no wheezing , rhonchi  Gastrointestinal: soft non tender non distended, no masses felt, no organomegally  Muscloskeletal: no lower extremity edema, intact bilateral lower extremity pulses  Neurological: CN II-12 intact. No focal weakness  Psychiatrical: normal mood, cooperative  SKIN: no rash, lesions or ulcers    Labs:                          13.7   7.57  )-----------( 143      ( 23 Aug 2022 06:00 )             40.0     08-23    132<L>  |  98  |  28<H>  ----------------------------<  98  4.0   |  30  |  1.26    Ca    9.5      23 Aug 2022 06:00  Phos  3.0     08-22  Mg     1.9     08-23    TPro  6.2  /  Alb  3.5  /  TBili  0.6  /  DBili  x   /  AST  20  /  ALT  32  /  AlkPhos  84  08-22    LIVER FUNCTIONS - ( 22 Aug 2022 06:35 )  Alb: 3.5 g/dL / Pro: 6.2 g/dL / ALK PHOS: 84 U/L / ALT: 32 U/L DA / AST: 20 U/L / GGT: x                 Active Medications  MEDICATIONS  (STANDING):  amLODIPine   Tablet 5 milliGRAM(s) Oral daily  amLODIPine   Tablet 5 milliGRAM(s) Oral once  famotidine    Tablet 20 milliGRAM(s) Oral daily  gabapentin 200 milliGRAM(s) Oral three times a day  heparin   Injectable 5000 Unit(s) SubCutaneous every 8 hours  hydrALAZINE 100 milliGRAM(s) Oral every 8 hours  sertraline 100 milliGRAM(s) Oral daily    MEDICATIONS  (PRN):  acetaminophen     Tablet .. 650 milliGRAM(s) Oral every 6 hours PRN Temp greater or equal to 38C (100.4F), Mild Pain (1 - 3)    
Date/Time Patient Seen:  		  Referring MD:   Data Reviewed	       Patient is a 71y old  Male who presents with a chief complaint of Bradycardia     (22 Aug 2022 11:54)      Subjective/HPI     PAST MEDICAL & SURGICAL HISTORY:  Hypertension    Central vein occlusion of retina  2015    Aortic dissection, thoracic    Anxiety    GERD (gastroesophageal reflux disease)    Melanoma in situ  right arm; 2014    Neuroma  right groin s/p aortic aneurysm repair 2013    Other specified disorders of kidney and ureter    Polycythemia    Central retinal vein thrombosis  right    CLL (chronic lymphocytic leukemia)    Lymphoma, small lymphocytic    Small lymphocytic lymphoma    S/P aortic dissection repair    Melanoma in situ  Excision of menaloma of right arm; 2014    History of tonsillectomy and adenoidectomy  at age 3    S/P cholecystectomy  2017    History of loop recorder  [employer] [id number] sn [id number]          Medication list         MEDICATIONS  (STANDING):  amLODIPine   Tablet 5 milliGRAM(s) Oral daily  famotidine    Tablet 20 milliGRAM(s) Oral daily  gabapentin 200 milliGRAM(s) Oral three times a day  heparin   Injectable 5000 Unit(s) SubCutaneous every 8 hours  hydrALAZINE 100 milliGRAM(s) Oral every 8 hours  hydrALAZINE 25 milliGRAM(s) Oral once  sertraline 100 milliGRAM(s) Oral daily    MEDICATIONS  (PRN):  acetaminophen     Tablet .. 650 milliGRAM(s) Oral every 6 hours PRN Temp greater or equal to 38C (100.4F), Mild Pain (1 - 3)         Vitals log        ICU Vital Signs Last 24 Hrs  T(C): 36.5 (23 Aug 2022 06:32), Max: 36.9 (23 Aug 2022 00:17)  T(F): 97.7 (23 Aug 2022 06:32), Max: 98.4 (23 Aug 2022 00:17)  HR: 71 (23 Aug 2022 06:00) (66 - 84)  BP: 146/85 (23 Aug 2022 04:00) (110/80 - 152/84)  BP(mean): 102 (23 Aug 2022 04:00) (89 - 107)  ABP: --  ABP(mean): --  RR: 22 (23 Aug 2022 06:00) (12 - 22)  SpO2: 100% (23 Aug 2022 06:00) (96% - 100%)    O2 Parameters below as of 22 Aug 2022 16:00  Patient On (Oxygen Delivery Method): room air                 Input and Output:  I&O's Detail    22 Aug 2022 07:01  -  23 Aug 2022 07:00  --------------------------------------------------------  IN:    Oral Fluid: 360 mL  Total IN: 360 mL    OUT:    Voided (mL): 1300 mL  Total OUT: 1300 mL    Total NET: -940 mL          Lab Data                        13.7   7.57  )-----------( 143      ( 23 Aug 2022 06:00 )             40.0     08-23    132<L>  |  98  |  28<H>  ----------------------------<  98  4.0   |  30  |  1.26    Ca    9.5      23 Aug 2022 06:00  Phos  3.0     08-22  Mg     1.9     08-23    TPro  6.2  /  Alb  3.5  /  TBili  0.6  /  DBili  x   /  AST  20  /  ALT  32  /  AlkPhos  84  08-22            Review of Systems	      Objective     Physical Examination    heart s1s2  lung dec BS  abd soft  head nc      Pertinent Lab findings & Imaging      Rm:  NO   Adequate UO     I&O's Detail    22 Aug 2022 07:01  -  23 Aug 2022 07:00  --------------------------------------------------------  IN:    Oral Fluid: 360 mL  Total IN: 360 mL    OUT:    Voided (mL): 1300 mL  Total OUT: 1300 mL    Total NET: -940 mL               Discussed with:     Cultures:	        Radiology                            
Patient is a 71y old  Male who presents with a chief complaint of Bradycardia and hypotension.      INTERVAL HPI/OVERNIGHT EVENTS: Pt states he feels better. Denies lightheadedness. Denies SOB, CP, palpitations, fever, chills, abd pain.     MEDICATIONS  (STANDING):  amLODIPine   Tablet 5 milliGRAM(s) Oral daily  famotidine    Tablet 20 milliGRAM(s) Oral daily  gabapentin 200 milliGRAM(s) Oral three times a day  heparin   Injectable 5000 Unit(s) SubCutaneous every 8 hours  hydrALAZINE 50 milliGRAM(s) Oral every 8 hours  sertraline 100 milliGRAM(s) Oral daily    MEDICATIONS  (PRN):  acetaminophen     Tablet .. 650 milliGRAM(s) Oral every 6 hours PRN Temp greater or equal to 38C (100.4F), Mild Pain (1 - 3)      Allergies    Methylate (Rash)    Intolerances    Keflex (Stomach Upset)      REVIEW OF SYSTEMS:  CONSTITUTIONAL: No fever or chills  HEENT:  No headache, no sore throat  RESPIRATORY: No cough, wheezing, or shortness of breath  CARDIOVASCULAR: No chest pain, palpitations  GASTROINTESTINAL: No abd pain, nausea, vomiting, or diarrhea  GENITOURINARY: No dysuria, frequency, or hematuria  NEUROLOGICAL: no focal weakness or dizziness  MUSCULOSKELETAL: no myalgias     Vital Signs Last 24 Hrs  T(C): 36.8 (08-22-22 @ 08:16), Max: 36.9 (08-21-22 @ 14:43)  HR: 69 (08-22-22 @ 08:00) (52 - 78)  BP: 144/91 (08-22-22 @ 08:00) (117/71 - 150/118)  RR: 12 (08-22-22 @ 08:00) (7 - 17)  SpO2: 100% (08-22-22 @ 08:00) (98% - 100%)    Parameters below as of 22 Aug 2022 08:00  Patient On (Oxygen Delivery Method): room air        PHYSICAL EXAM:  GENERAL: NAD  HEENT:  anicteric, moist mucous membranes  CHEST/LUNG:  CTA b/l, no rales, wheezes, or rhonchi  HEART:  RRR, S1, S2  ABDOMEN:  BS+, soft, nontender, nondistended  EXTREMITIES: no edema, cyanosis, or calf tenderness  NERVOUS SYSTEM: answers questions and follows commands appropriately    LABS:             08-22    135  |  101  |  29<H>  ----------------------------<  97  4.0   |  30  |  1.16    Ca    9.4      22 Aug 2022 06:35  Phos  3.0     08-22  Mg     2.2     08-22    TPro  6.2  /  Alb  3.5  /  TBili  0.6  /  DBili  x   /  AST  20  /  ALT  32  /  AlkPhos  84  08-22                         14.5   8.21  )-----------( 149      ( 22 Aug 2022 06:35 )             42.1       08-21 @ 14:58 BNP: 446 pg/mL          CAPILLARY BLOOD GLUCOSE              RADIOLOGY & ADDITIONAL TESTS:    Personally reviewed.     Consultant(s) Notes Reviewed:  [x] YES  [ ] NO

## 2022-08-23 NOTE — DISCHARGE NOTE NURSING/CASE MANAGEMENT/SOCIAL WORK - NSDCPEFALRISK_GEN_ALL_CORE
For information on Fall & Injury Prevention, visit: [email or web address] OR  [email or web address] OR  https://www.cdc.gov/steadi/patient.html

## 2022-08-23 NOTE — DISCHARGE NOTE NURSING/CASE MANAGEMENT/SOCIAL WORK - PATIENT PORTAL LINK FT
You can access the FollowMyHealth Patient Portal offered by [facility] by registering at the following website: [email or web address]. By joining [employer]’s FollowMyHealth portal, you will also be able to view your health information using other applications (apps) compatible with our system.

## 2022-08-23 NOTE — DISCHARGE NOTE PROVIDER - NSDCMRMEDTOKEN_GEN_ALL_CORE_FT
amLODIPine 5 mg oral tablet: 1 tab(s) orally once a day  famotidine 40 mg oral tablet: 1 tab(s) orally once a day  gabapentin 100 mg oral tablet: 2 tab(s) orally 3 times a day  hydrALAZINE 100 mg oral tablet: 1 tab(s) orally every 8 hours  Niaspan  mg oral tablet, extended release: 1 tab(s) orally once a day (at bedtime)  sertraline 100 mg oral tablet: 1 tab(s) orally once a day

## 2022-08-23 NOTE — DISCHARGE NOTE PROVIDER - ATTENDING DISCHARGE PHYSICAL EXAMINATION:
Objective:    Vitals:  T(C): 36.6 (08-23-22 @ 08:30), Max: 36.9 (08-23-22 @ 00:17)  HR: 72 (08-23-22 @ 08:00) (66 - 84)  BP: 168/96 (08-23-22 @ 08:00) (110/80 - 168/96)  RR: 18 (08-23-22 @ 10:00) (12 - 22)  SpO2: 100% (08-23-22 @ 10:00) (96% - 100%)    Physical Exam:  General: comfortable, no acute distress, well nourished  HEENT: Atraumatic, no LAD, trachea midline, PERRLA  Cardiovascular: normal s1s2, no murmurs, gallops or fricition rubs  Pulmonary: clear to ausculation Bilaterally, no wheezing , rhonchi  Gastrointestinal: soft non tender non distended, no masses felt, no organomegally  Muscloskeletal: no lower extremity edema, intact bilateral lower extremity pulses  Neurological: CN II-12 intact. No focal weakness  Psychiatrical: normal mood, cooperative  SKIN: no rash, lesions or ulcers

## 2022-08-23 NOTE — DISCHARGE NOTE PROVIDER - HOSPITAL COURSE
yo M w/ PMH of CLL / SLL (not on treatment), hx of Renal Tumor (s/p Partial L Nephrectomy), HTN, HLD, hx of ascending aorta dissection s/p repair 2013, p/w lightheadedness, bradycardia and hypotension a/w symptomatic bradycardia, RADHA and hyponatremia.     #Symptomatic bradycardia:  - suspect pt's bradycardia contributed to hypotension and mild lightheadedness   - on 8/18 his metoprolol was decreased from 37.5mg / 25mg / 37.5mg that he was taking q8h at home to just 25mg BID by his cardiologist's office as pt had had hypotension/bradycardia  - discontinued metoprolol in the hospital and monitor on telemetry  - EKG pt in SR with 1st degree AVB  - pt's loop recorder seems to have been showing sinus ioana even when his HR was in the low-30s before admission  - cardio (Silvia) consulted and recs appreciated  - spoke to NP (Shahid Harman) in [facility] who called back when hospital had reached out to speak with pt's cardiologist. That NP stated that if needed, pt can be transferred to [facility] to see Dr. Monte (EP) to have an EP study  - pt had a fall ~5-7 days ago where he hit his head -- low suspicion, but checked CT Head to r/out ICH, which can cause increased intracranial pressure and worsening of bradycardia -- no ICH on CT  - cardio left several messages for pt's outpt cardiologist, Dr. Gayle, awaiting call back on whether would like pt transferred for EP eval or to d/c with outpt f/up    #Hypotension:  - pt reported having BP of 74/47 at home prior to coming to the ER  - this may have been in part from severe bradycardia, but also likely from some dehydration, hyponatremia, and from high doses of anti-hypertensives  - d/c metoprolol  - decreased hydralazine from home dose of 100mg po TID down to 50mg po TID with hold parameters and continued home norvasc 5mg po daily with hold parameters  - will uptitrate hydralazine dose toward prior home dose of 100mg po TID if pt's BP is rising  - monitor BP - stable  - cardio [place]) consulted and will evaluate the pt tomorrow  - will take off salt restriction from the diet for now  - will hold niacin for now  - orthostatic BP was normal in the ER after the bolus    #RADHA:   - on recent ER visit on 07/30/22, pt's Cr was 1.18 and on this admission it is 1.78  - likely was prerenal from dehydration as RADHA resolved overnight with hydration  - pt does not use NSAIDS  - avoid nephrotoxic drugs    #Hyponatremia:  - sodium of 127 on admission; was 129 on ER visit three weeks ago  - pt has a strict low sodium diet, which likely plays a large role in hyponatremia  - pt also on sertraline 100mg po daily which can cause hyponatremia, but this is a long-term medication without recent dose change, so will try to liberalize salt intake in the diet and monitor levels prior to changing the sertraline  - Na up to 135 now after hydration   - monitor BMP    #HLD:  - not on meds    #Depression:  - c/w sertraline -- see note re: hyponatremia above    #CLL / SLL:  - being monitored / not on chemo  - c/w outpt f/up / monitoring    #Neuropathy:  - c/w gabapentin    #GERD:  - had decreased famotidine to 20mg po daily (renal dosing in setting of RADHA) - can go back up if renal function stable    #VTE ppx:  - SCDs  - HSQ 5000un sq q8h     plan: control bp: discuss with cards either transfer for EP or dc home

## 2022-08-23 NOTE — DISCHARGE NOTE PROVIDER - NSDCCPCAREPLAN_GEN_ALL_CORE_FT
PRINCIPAL DISCHARGE DIAGNOSIS  Diagnosis: Bradycardia  Assessment and Plan of Treatment: Please stop your home dose of metoprolol until you are seen by your outpatient cardiologist  we have adjusted your blood pressure medications   we recommend at this time to take  1. hydrazaline 100mg TID   2. norvasc 5mg daily  if your BP remains elevated >160 on this regime you may take one additional norvasc 5mg   Please followup with Dr. Gayle within the next week

## 2022-08-23 NOTE — DISCHARGE NOTE PROVIDER - NSDCFUSCHEDAPPT_GEN_ALL_CORE_FT
[facility]  Cardio Echo [phone]  Scheduled Appointment: 09/07/2022    [facility]  Cardio Electro [phone]  Scheduled Appointment: 09/29/2022    [facility]  ELECTROPH [phone]  Scheduled Appointment: 11/03/2022

## 2022-08-23 NOTE — PROGRESS NOTE ADULT - ASSESSMENT
72yo M w/ PMH of CLL / SLL (not on treatment), hx of Renal Tumor (s/p Partial L Nephrectomy), HTN, HLD, hx of ascending aorta dissection s/p repair 2013, p/w lightheadedness, bradycardia and hypotension    RADHA  Hypotension  HLD  hx of HTN  CLL  AAA  OP  OA  Bradycardia     RADHA resolved  cvs rx regimen and BP control optimization in progress  cardio follow up  monitor VS and HD and Sat  check Orthostatics  out of bed as tolerated  TTE reviewed
72yo M w/ PMH of CLL / SLL (not on treatment), hx of Renal Tumor (s/p Partial L Nephrectomy), HTN, HLD, hx of ascending aorta dissection s/p repair 2013, p/w lightheadedness, bradycardia and hypotension a/w symptomatic bradycardia, RADHA and hyponatremia.     #Symptomatic bradycardia:  - suspect pt's bradycardia contributed to hypotension and mild lightheadedness   - as pt's HR improved in the ER, BP donny, as well  - HR had been decreasing for at least a week, but pt's spouse believes it may have been longer  - on 8/18 his metoprolol was decreased from 37.5mg / 25mg / 37.5mg that he was taking q8h at home to just 25mg BID by his cardiologist's office as pt had had hypotension/bradycardia  - discontinued metoprolol in the hospital and monitor on telemetry to see if any bradycardia without the AVN blocker -- none on review of tele in [facility]  - on EKG pt in SR with 1st degree AVB  - pt's loop recorder seems to have been showing sinus ioana even when his HR was in the low-30s before admission  - cardio (Silvia) consulted and recs appreciated  - spoke to NP (Shahid Harman) in [facility] who called back when hospital had reached out to speak with pt's cardiologist. That NP stated that if needed, pt can be transferred to [facility] to see Dr. Monte (EP) to have an EP study  - pt had a fall ~5-7 days ago where he hit his head -- low suspicion, but checked CT Head to r/out ICH, which can cause increased intracranial pressure and worsening of bradycardia -- no ICH on CT  - cardio left several messages for pt's outpt cardiologist, Dr. Gayle, awaiting call back on whether would like pt transferred for EP eval or to d/c with outpt f/up    #Hypotension:  - pt reported having BP of 74/47 at home prior to coming to the ER  - this may have been in part from severe bradycardia, but also likely from some dehydration, hyponatremia, and from high doses of anti-hypertensives  - d/c metoprolol  - decreased hydralazine from home dose of 100mg po TID down to 50mg po TID with hold parameters and continued home norvasc 5mg po daily with hold parameters  - will uptitrate hydralazine dose toward prior home dose of 100mg po TID if pt's BP is rising  - monitor BP - stable  - cardio (Silvia) consulted and will evaluate the pt tomorrow  - will take off salt restriction from the diet for now  - will hold niacin for now  - orthostatic BP was normal in the ER after the bolus    #RADHA:   - on recent ER visit on 07/30/22, pt's Cr was 1.18 and on this admission it is 1.78  - likely was prerenal from dehydration as RADHA resolved overnight with hydration  - pt does not use NSAIDS  - avoid nephrotoxic drugs    #Hyponatremia:  - sodium of 127 on admission; was 129 on ER visit three weeks ago  - pt has a strict low sodium diet, which likely plays a large role in hyponatremia  - pt also on sertraline 100mg po daily which can cause hyponatremia, but this is a long-term medication without recent dose change, so will try to liberalize salt intake in the diet and monitor levels prior to changing the sertraline  - Na up to 135 now after hydration   - monitor BMP    #HLD:  - not on meds    #Depression:  - c/w sertraline -- see note re: hyponatremia above    #CLL / SLL:  - being monitored / not on chemo  - c/w outpt f/up / monitoring    #Neuropathy:  - c/w gabapentin    #GERD:  - had decreased famotidine to 20mg po daily (renal dosing in setting of RADHA) - can go back up if renal function stable    #VTE ppx:  - SCDs  - HSQ 5000un sq q8h 
The patient is a 71 year old male with a history of HTN, HL, CLL, orthostatic hypotension, thoracic aortic dissection s/p repair who is admitted with dizziness.    Plan:  - ILR interrogated - no bradyarrhythmias noted beyond intermittent sinus bradycardia  - Suspect that the patient's dizziness and presyncope is due to orthostatic hypotension and not arrhythmic in nature  - Orthostatics positive  - Despite aortic history, would allow for supine BPs to be higher given ongoing orthostatic issues  - Hold metoprolol for now - this can always be resumed at a lower dose as outpatient  - Continue amlodipine 5 mg daily  - Continue hydralazine 100 mg tid  - If orthostatic hypotension remains an issue, may need to lower other antihypertensives  - If supine or sitting BP elevated, would recheck while standing prior to increasing antihypertensives  - Discharge planning
72yo M w/ PMH of CLL / SLL (not on treatment), hx of Renal Tumor (s/p Partial L Nephrectomy), HTN, HLD, hx of ascending aorta dissection s/p repair 2013, p/w lightheadedness, bradycardia and hypotension a/w symptomatic bradycardia, RADHA and hyponatremia.     #Symptomatic bradycardia:  - suspect pt's bradycardia contributed to hypotension and mild lightheadedness   - on 8/18 his metoprolol was decreased from 37.5mg / 25mg / 37.5mg that he was taking q8h at home to just 25mg BID by his cardiologist's office as pt had had hypotension/bradycardia  - discontinued metoprolol in the hospital and monitor on telemetry  - EKG pt in SR with 1st degree AVB  - pt's loop recorder seems to have been showing sinus ioana even when his HR was in the low-30s before admission  - cardio (Silvia) consulted and recs appreciated  - spoke to NP (Shahid Harman) in [facility] who called back when hospital had reached out to speak with pt's cardiologist. That NP stated that if needed, pt can be transferred to [facility] to see Dr. Monte (EP) to have an EP study  - pt had a fall ~5-7 days ago where he hit his head -- low suspicion, but checked CT Head to r/out ICH, which can cause increased intracranial pressure and worsening of bradycardia -- no ICH on CT  - cardio left several messages for pt's outpt cardiologist, Dr. Gayle, awaiting call back on whether would like pt transferred for EP eval or to d/c with outpt f/up    #Hypotension:  - pt reported having BP of 74/47 at home prior to coming to the ER  - this may have been in part from severe bradycardia, but also likely from some dehydration, hyponatremia, and from high doses of anti-hypertensives  - d/c metoprolol  - decreased hydralazine from home dose of 100mg po TID down to 50mg po TID with hold parameters and continued home norvasc 5mg po daily with hold parameters  - will uptitrate hydralazine dose toward prior home dose of 100mg po TID if pt's BP is rising  - monitor BP - stable  - cardio [place]) consulted and will evaluate the pt tomorrow  - will take off salt restriction from the diet for now  - will hold niacin for now  - orthostatic BP was normal in the ER after the bolus    #RADHA:   - on recent ER visit on 07/30/22, pt's Cr was 1.18 and on this admission it is 1.78  - likely was prerenal from dehydration as RADHA resolved overnight with hydration  - pt does not use NSAIDS  - avoid nephrotoxic drugs    #Hyponatremia:  - sodium of 127 on admission; was 129 on ER visit three weeks ago  - pt has a strict low sodium diet, which likely plays a large role in hyponatremia  - pt also on sertraline 100mg po daily which can cause hyponatremia, but this is a long-term medication without recent dose change, so will try to liberalize salt intake in the diet and monitor levels prior to changing the sertraline  - Na up to 135 now after hydration   - monitor BMP    #HLD:  - not on meds    #Depression:  - c/w sertraline -- see note re: hyponatremia above    #CLL / SLL:  - being monitored / not on chemo  - c/w outpt f/up / monitoring    #Neuropathy:  - c/w gabapentin    #GERD:  - had decreased famotidine to 20mg po daily (renal dosing in setting of RADHA) - can go back up if renal function stable    #VTE ppx:  - SCDs  - HSQ 5000un sq q8h     plan: control bp: discuss with cards either transfer for EP or dc home

## 2022-08-24 ENCOUNTER — OUTPATIENT (OUTPATIENT)
Dept: OUTPATIENT SERVICES | Facility: HOSPITAL | Age: 72
LOS: 1 days | End: 2022-08-24
Payer: MEDICARE

## 2022-08-24 ENCOUNTER — APPOINTMENT (OUTPATIENT)
Dept: MRI IMAGING | Facility: CLINIC | Age: 72
End: 2022-08-24

## 2022-08-24 DIAGNOSIS — Z90.49 ACQUIRED ABSENCE OF OTHER SPECIFIED PARTS OF DIGESTIVE TRACT: Chronic | ICD-10-CM

## 2022-08-24 DIAGNOSIS — Z98.890 OTHER SPECIFIED POSTPROCEDURAL STATES: Chronic | ICD-10-CM

## 2022-08-24 DIAGNOSIS — D03.9 MELANOMA IN SITU, UNSPECIFIED: Chronic | ICD-10-CM

## 2022-08-24 DIAGNOSIS — Z98.890 OTHER SPECIFIED POSTPROCEDURAL STATES: ICD-10-CM

## 2022-08-24 DIAGNOSIS — I71.9 AORTIC ANEURYSM OF UNSPECIFIED SITE, WITHOUT RUPTURE: ICD-10-CM

## 2022-08-24 PROBLEM — C91.10 CHRONIC LYMPHOCYTIC LEUKEMIA OF B-CELL TYPE NOT HAVING ACHIEVED REMISSION: Chronic | Status: ACTIVE | Noted: 2022-08-21

## 2022-08-24 PROBLEM — C83.00 SMALL CELL B-CELL LYMPHOMA, UNSPECIFIED SITE: Chronic | Status: ACTIVE | Noted: 2022-08-21

## 2022-08-24 PROCEDURE — 71555 MRI ANGIO CHEST W OR W/O DYE: CPT | Mod: 26,MH

## 2022-08-24 PROCEDURE — C8911: CPT

## 2022-08-24 PROCEDURE — A9585: CPT

## 2022-08-31 ENCOUNTER — NON-APPOINTMENT (OUTPATIENT)
Age: 72
End: 2022-08-31

## 2022-09-07 ENCOUNTER — OUTPATIENT (OUTPATIENT)
Dept: OUTPATIENT SERVICES | Facility: HOSPITAL | Age: 72
LOS: 1 days | End: 2022-09-07

## 2022-09-07 ENCOUNTER — APPOINTMENT (OUTPATIENT)
Dept: CV DIAGNOSITCS | Facility: HOSPITAL | Age: 72
End: 2022-09-07

## 2022-09-07 DIAGNOSIS — Z90.49 ACQUIRED ABSENCE OF OTHER SPECIFIED PARTS OF DIGESTIVE TRACT: Chronic | ICD-10-CM

## 2022-09-07 DIAGNOSIS — Z98.890 OTHER SPECIFIED POSTPROCEDURAL STATES: Chronic | ICD-10-CM

## 2022-09-07 DIAGNOSIS — I10 ESSENTIAL (PRIMARY) HYPERTENSION: ICD-10-CM

## 2022-09-07 DIAGNOSIS — D03.9 MELANOMA IN SITU, UNSPECIFIED: Chronic | ICD-10-CM

## 2022-09-07 PROCEDURE — 93306 TTE W/DOPPLER COMPLETE: CPT | Mod: 26

## 2022-09-08 ENCOUNTER — APPOINTMENT (OUTPATIENT)
Dept: ELECTROPHYSIOLOGY | Facility: CLINIC | Age: 72
End: 2022-09-08

## 2022-09-08 ENCOUNTER — NON-APPOINTMENT (OUTPATIENT)
Age: 72
End: 2022-09-08

## 2022-09-08 PROCEDURE — 93298 REM INTERROG DEV EVAL SCRMS: CPT

## 2022-09-08 PROCEDURE — G2066: CPT

## 2022-09-19 ENCOUNTER — NON-APPOINTMENT (OUTPATIENT)
Age: 72
End: 2022-09-19

## 2022-09-29 ENCOUNTER — APPOINTMENT (OUTPATIENT)
Dept: ELECTROPHYSIOLOGY | Facility: CLINIC | Age: 72
End: 2022-09-29

## 2022-09-29 PROCEDURE — 93285 PRGRMG DEV EVAL SCRMS IP: CPT

## 2022-09-29 RX ORDER — METOPROLOL TARTRATE 25 MG/1
25 TABLET, FILM COATED ORAL TWICE DAILY
Qty: 180 | Refills: 3 | Status: DISCONTINUED | COMMUNITY
Start: 2020-11-11 | End: 2022-09-29

## 2022-09-30 ENCOUNTER — NON-APPOINTMENT (OUTPATIENT)
Age: 72
End: 2022-09-30

## 2022-10-17 ENCOUNTER — NON-APPOINTMENT (OUTPATIENT)
Age: 72
End: 2022-10-17

## 2022-11-03 ENCOUNTER — NON-APPOINTMENT (OUTPATIENT)
Age: 72
End: 2022-11-03

## 2022-11-03 ENCOUNTER — APPOINTMENT (OUTPATIENT)
Dept: ELECTROPHYSIOLOGY | Facility: CLINIC | Age: 72
End: 2022-11-03

## 2022-11-03 PROCEDURE — 93298 REM INTERROG DEV EVAL SCRMS: CPT

## 2022-11-03 PROCEDURE — G2066: CPT

## 2022-12-04 ENCOUNTER — NON-APPOINTMENT (OUTPATIENT)
Age: 72
End: 2022-12-04

## 2022-12-05 ENCOUNTER — NON-APPOINTMENT (OUTPATIENT)
Age: 72
End: 2022-12-05

## 2022-12-05 ENCOUNTER — APPOINTMENT (OUTPATIENT)
Dept: ELECTROPHYSIOLOGY | Facility: CLINIC | Age: 72
End: 2022-12-05

## 2022-12-05 PROCEDURE — 93298 REM INTERROG DEV EVAL SCRMS: CPT

## 2022-12-05 PROCEDURE — G2066: CPT

## 2022-12-08 ENCOUNTER — APPOINTMENT (OUTPATIENT)
Dept: ELECTROPHYSIOLOGY | Facility: CLINIC | Age: 72
End: 2022-12-08

## 2022-12-10 ENCOUNTER — RX RENEWAL (OUTPATIENT)
Age: 72
End: 2022-12-10

## 2022-12-15 ENCOUNTER — NON-APPOINTMENT (OUTPATIENT)
Age: 72
End: 2022-12-15

## 2022-12-22 ENCOUNTER — NON-APPOINTMENT (OUTPATIENT)
Age: 72
End: 2022-12-22

## 2022-12-23 ENCOUNTER — NON-APPOINTMENT (OUTPATIENT)
Age: 72
End: 2022-12-23

## 2023-01-04 ENCOUNTER — APPOINTMENT (OUTPATIENT)
Dept: CARDIOLOGY | Facility: CLINIC | Age: 73
End: 2023-01-04
Payer: MEDICARE

## 2023-01-04 VITALS
HEIGHT: 69 IN | DIASTOLIC BLOOD PRESSURE: 94 MMHG | BODY MASS INDEX: 23.85 KG/M2 | WEIGHT: 161 LBS | OXYGEN SATURATION: 98 % | SYSTOLIC BLOOD PRESSURE: 150 MMHG | HEART RATE: 62 BPM

## 2023-01-04 PROCEDURE — 93000 ELECTROCARDIOGRAM COMPLETE: CPT

## 2023-01-04 PROCEDURE — 99214 OFFICE O/P EST MOD 30 MIN: CPT

## 2023-01-05 ENCOUNTER — RX RENEWAL (OUTPATIENT)
Age: 73
End: 2023-01-05

## 2023-01-05 LAB
ALBUMIN SERPL ELPH-MCNC: 4.3 G/DL
ALP BLD-CCNC: 101 U/L
ALT SERPL-CCNC: 19 U/L
ANION GAP SERPL CALC-SCNC: 15 MMOL/L
AST SERPL-CCNC: 22 U/L
BASOPHILS # BLD AUTO: 0.04 K/UL
BASOPHILS NFR BLD AUTO: 0.5 %
BILIRUB SERPL-MCNC: 0.8 MG/DL
BUN SERPL-MCNC: 31 MG/DL
CALCIUM SERPL-MCNC: 9.7 MG/DL
CHLORIDE SERPL-SCNC: 102 MMOL/L
CHOLEST SERPL-MCNC: 188 MG/DL
CO2 SERPL-SCNC: 26 MMOL/L
CREAT SERPL-MCNC: 1.33 MG/DL
EGFR: 56 ML/MIN/1.73M2
EOSINOPHIL # BLD AUTO: 0.13 K/UL
EOSINOPHIL NFR BLD AUTO: 1.6 %
ESTIMATED AVERAGE GLUCOSE: 103 MG/DL
GLUCOSE SERPL-MCNC: 68 MG/DL
HBA1C MFR BLD HPLC: 5.2 %
HCT VFR BLD CALC: 47.8 %
HDLC SERPL-MCNC: 37 MG/DL
HGB BLD-MCNC: 16.2 G/DL
IMM GRANULOCYTES NFR BLD AUTO: 0.3 %
LDLC SERPL CALC-MCNC: 117 MG/DL
LYMPHOCYTES # BLD AUTO: 3.56 K/UL
LYMPHOCYTES NFR BLD AUTO: 45.2 %
MAN DIFF?: NORMAL
MCHC RBC-ENTMCNC: 29.8 PG
MCHC RBC-ENTMCNC: 33.9 GM/DL
MCV RBC AUTO: 87.9 FL
MONOCYTES # BLD AUTO: 0.68 K/UL
MONOCYTES NFR BLD AUTO: 8.6 %
NEUTROPHILS # BLD AUTO: 3.45 K/UL
NEUTROPHILS NFR BLD AUTO: 43.8 %
NONHDLC SERPL-MCNC: 151 MG/DL
PLATELET # BLD AUTO: 122 K/UL
POTASSIUM SERPL-SCNC: 3.6 MMOL/L
PROT SERPL-MCNC: 6.2 G/DL
PSA SERPL-MCNC: 5.06 NG/ML
RBC # BLD: 5.44 M/UL
RBC # FLD: 13.9 %
SODIUM SERPL-SCNC: 143 MMOL/L
TRIGL SERPL-MCNC: 171 MG/DL
TSH SERPL-ACNC: 3.17 UIU/ML
WBC # FLD AUTO: 7.88 K/UL

## 2023-01-10 ENCOUNTER — NON-APPOINTMENT (OUTPATIENT)
Age: 73
End: 2023-01-10

## 2023-01-10 ENCOUNTER — APPOINTMENT (OUTPATIENT)
Dept: ELECTROPHYSIOLOGY | Facility: CLINIC | Age: 73
End: 2023-01-10
Payer: MEDICARE

## 2023-01-10 PROCEDURE — G2066: CPT

## 2023-01-10 PROCEDURE — 93298 REM INTERROG DEV EVAL SCRMS: CPT

## 2023-01-18 ENCOUNTER — APPOINTMENT (OUTPATIENT)
Dept: UROLOGY | Facility: CLINIC | Age: 73
End: 2023-01-18
Payer: MEDICARE

## 2023-01-18 DIAGNOSIS — R97.20 ELEVATED PROSTATE, SPECIFIC ANTIGEN [PSA]: ICD-10-CM

## 2023-01-18 PROCEDURE — 99213 OFFICE O/P EST LOW 20 MIN: CPT | Mod: 95

## 2023-01-18 NOTE — ASSESSMENT
[FreeTextEntry1] : reviewed risks benefits problems and controversies of PSA screening.\par noted age related levels and lack of evidence screening after 70 a benefit.\par plan to repeat at free/total and TRUS to size and calculate PSA density

## 2023-01-18 NOTE — HISTORY OF PRESENT ILLNESS
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 Nocturia once. FOS ok with no hesitancy, intermittency or straining. No dysuria, hematuria or UTIs. no retention.\par Has ED following his aneurysm surgery - not interested in medications.\par PSA 3.1 . \par \par DUE to COVID opted fopr TEB. No change in LIUTs - nothing new. no dysuria or hematuria. \par \par seen 8/21 with finding of a left renal mass noted of recent CT scan performed as f/u for AAA\par reviewed this scan and one from last year with patient and wife: notes a 1.6cm mixed cystic solid lesion anterior surface of right kidney; noted lesion present last year at 1cm and more systic then. Also note 2 enlarged LNs one 2cm in the left hilum \par \par now s/p Lap partial Nephrectomy and LN removal\par doing well with no issues \par \par 3/22 - here for f/u. no kidney issues. \par transferred his lymphoma care to [facility] - on AS. \par \par 8/22 had CT oat [place] - no recurrence.\par note the lympadenopathy  as beforew. \par \par 1/23 - opted for TEB\par concern about recent PSA - 5.06 from 3.95 8/20\par no change in voiding

## 2023-01-18 NOTE — DISCHARGE NOTE ADULT - ABILITY TO HEAR (WITH HEARING AID OR HEARING APPLIANCE IF NORMALLY USED):
Inpatient Rehabilitation - Occupational Therapy Treatment Note    [facility]     Patient Name: Rubén Lane  : 1977  MRN: [id number]    [facility])   • Sinus pause   • Physical deconditioning   • Leg pain, diffuse, left   • Anoxic brain injury ([facility])       Jannie Diallo MD;  Location: [facility] CATH INVASIVE LOCATION;  Service: Cardiology;  Laterality: N/A;             IRF OT ASSESSMENT FLOWSHEET (last 12 hours)     IRF OT Evaluation and Treatment     Row Name [id number]          OT Time and Intention    Document Type daily treatment  -LM     Mode of Treatment individual therapy;occupational therapy  -LM     Patient Effort good  -LM     Row Name [id number]          General Information    Patient Profile Reviewed yes  -LM     Existing Precautions/Restrictions fall;other (see comments);pacemaker  external pacemaker  -LM     Row Name 23          Pain Assessment    Pretreatment Pain Rating 10/10  -LM      Adequate: hears normal conversation without difficulty [place] (Bed Mobility) modified independence  -     Sit-Supine [place] (Bed Mobility) modified independence  -     Bienvenido Baldwin PTA Physical Therapist Assistant    Anupam Mcgraw Bienvenido Baldwin, EARL Physical Therapist Assistant    Amina Palomino COTA Occupational Therapist Assistant              Therapy Charges for Today     Code Description Service Date Service Provider Modifiers Qty    97785854992  OT THERAPEUTIC ACT EA 15  MIN 1/17/2023 Amina Felipe COTA GO 3    23695334791 HC OT THER PROC EA 15 MIN 1/17/2023 Amina Felipe COTA GO 1    77037380401 HC OT THERAPEUTIC ACT EA 15 MIN 1/18/2023 Amina Felipe COTA  2    81448028308 HC OT THER PROC EA 15 MIN 1/18/2023 Amina Felipe COTA  3    61075760092 HC OT SELF CARE/MGMT/TRAIN EA 15 MIN 1/18/2023 Amina Felipe COTA  1                   JAYCE Arora  1/18/2023

## 2023-01-26 LAB
PSA FREE FLD-MCNC: 29 %
PSA FREE SERPL-MCNC: 1.23 NG/ML
PSA SERPL-MCNC: 4.23 NG/ML

## 2023-02-14 ENCOUNTER — APPOINTMENT (OUTPATIENT)
Dept: ELECTROPHYSIOLOGY | Facility: CLINIC | Age: 73
End: 2023-02-14
Payer: MEDICARE

## 2023-02-14 ENCOUNTER — NON-APPOINTMENT (OUTPATIENT)
Age: 73
End: 2023-02-14

## 2023-02-14 PROCEDURE — G2066: CPT

## 2023-02-14 PROCEDURE — 93298 REM INTERROG DEV EVAL SCRMS: CPT

## 2023-03-21 ENCOUNTER — NON-APPOINTMENT (OUTPATIENT)
Age: 73
End: 2023-03-21

## 2023-03-21 ENCOUNTER — APPOINTMENT (OUTPATIENT)
Dept: ELECTROPHYSIOLOGY | Facility: CLINIC | Age: 73
End: 2023-03-21
Payer: MEDICARE

## 2023-03-21 PROCEDURE — 93298 REM INTERROG DEV EVAL SCRMS: CPT

## 2023-03-21 PROCEDURE — G2066: CPT

## 2023-04-12 ENCOUNTER — RX RENEWAL (OUTPATIENT)
Age: 73
End: 2023-04-12

## 2023-04-24 ENCOUNTER — APPOINTMENT (OUTPATIENT)
Dept: ELECTROPHYSIOLOGY | Facility: CLINIC | Age: 73
End: 2023-04-24
Payer: MEDICARE

## 2023-04-24 ENCOUNTER — NON-APPOINTMENT (OUTPATIENT)
Age: 73
End: 2023-04-24

## 2023-04-24 PROCEDURE — G2066: CPT

## 2023-04-24 PROCEDURE — 93298 REM INTERROG DEV EVAL SCRMS: CPT

## 2023-05-15 ENCOUNTER — NON-APPOINTMENT (OUTPATIENT)
Age: 73
End: 2023-05-15

## 2023-05-26 ENCOUNTER — NON-APPOINTMENT (OUTPATIENT)
Age: 73
End: 2023-05-26

## 2023-05-26 ENCOUNTER — APPOINTMENT (OUTPATIENT)
Dept: ELECTROPHYSIOLOGY | Facility: CLINIC | Age: 73
End: 2023-05-26
Payer: MEDICARE

## 2023-05-26 PROCEDURE — 93298 REM INTERROG DEV EVAL SCRMS: CPT

## 2023-05-26 PROCEDURE — G2066: CPT

## 2023-06-28 ENCOUNTER — RESULT REVIEW (OUTPATIENT)
Age: 73
End: 2023-06-28

## 2023-06-28 ENCOUNTER — OUTPATIENT (OUTPATIENT)
Dept: OUTPATIENT SERVICES | Facility: HOSPITAL | Age: 73
LOS: 1 days | End: 2023-06-28
Payer: MEDICARE

## 2023-06-28 ENCOUNTER — NON-APPOINTMENT (OUTPATIENT)
Age: 73
End: 2023-06-28

## 2023-06-28 DIAGNOSIS — Z98.890 OTHER SPECIFIED POSTPROCEDURAL STATES: Chronic | ICD-10-CM

## 2023-06-28 DIAGNOSIS — Z90.49 ACQUIRED ABSENCE OF OTHER SPECIFIED PARTS OF DIGESTIVE TRACT: Chronic | ICD-10-CM

## 2023-06-28 DIAGNOSIS — I71.9 AORTIC ANEURYSM OF UNSPECIFIED SITE, WITHOUT RUPTURE: ICD-10-CM

## 2023-06-28 DIAGNOSIS — R00.1 BRADYCARDIA, UNSPECIFIED: ICD-10-CM

## 2023-06-28 DIAGNOSIS — D03.9 MELANOMA IN SITU, UNSPECIFIED: Chronic | ICD-10-CM

## 2023-06-28 PROCEDURE — 93306 TTE W/DOPPLER COMPLETE: CPT

## 2023-06-29 PROCEDURE — 93306 TTE W/DOPPLER COMPLETE: CPT | Mod: 26

## 2023-06-30 ENCOUNTER — NON-APPOINTMENT (OUTPATIENT)
Age: 73
End: 2023-06-30

## 2023-06-30 ENCOUNTER — APPOINTMENT (OUTPATIENT)
Dept: ELECTROPHYSIOLOGY | Facility: CLINIC | Age: 73
End: 2023-06-30
Payer: MEDICARE

## 2023-06-30 PROCEDURE — G2066: CPT

## 2023-06-30 PROCEDURE — 93298 REM INTERROG DEV EVAL SCRMS: CPT

## 2023-07-03 PROBLEM — H34.8192 CENTRAL RETINAL VEIN OCCLUSION: Status: ACTIVE | Noted: 2017-03-22

## 2023-07-03 PROBLEM — R06.02 SHORTNESS OF BREATH ON EXERTION: Status: ACTIVE | Noted: 2017-09-14

## 2023-07-05 ENCOUNTER — NON-APPOINTMENT (OUTPATIENT)
Age: 73
End: 2023-07-05

## 2023-07-05 ENCOUNTER — LABORATORY RESULT (OUTPATIENT)
Age: 73
End: 2023-07-05

## 2023-07-05 ENCOUNTER — APPOINTMENT (OUTPATIENT)
Dept: CARDIOLOGY | Facility: CLINIC | Age: 73
End: 2023-07-05
Payer: MEDICARE

## 2023-07-05 VITALS
DIASTOLIC BLOOD PRESSURE: 98 MMHG | WEIGHT: 170 LBS | OXYGEN SATURATION: 97 % | SYSTOLIC BLOOD PRESSURE: 168 MMHG | HEART RATE: 64 BPM | BODY MASS INDEX: 25.1 KG/M2

## 2023-07-05 VITALS — SYSTOLIC BLOOD PRESSURE: 145 MMHG | DIASTOLIC BLOOD PRESSURE: 75 MMHG

## 2023-07-05 DIAGNOSIS — D69.1 QUALITATIVE PLATELET DEFECTS: ICD-10-CM

## 2023-07-05 DIAGNOSIS — H34.8192 CENTRAL RETINAL VEIN OCCLUSION, UNSPECIFIED EYE, STABLE: ICD-10-CM

## 2023-07-05 DIAGNOSIS — R06.02 SHORTNESS OF BREATH: ICD-10-CM

## 2023-07-05 PROCEDURE — 93000 ELECTROCARDIOGRAM COMPLETE: CPT

## 2023-07-05 PROCEDURE — 36415 COLL VENOUS BLD VENIPUNCTURE: CPT

## 2023-07-05 PROCEDURE — 99214 OFFICE O/P EST MOD 30 MIN: CPT

## 2023-07-06 LAB
ALBUMIN SERPL ELPH-MCNC: 4.5 G/DL
ALP BLD-CCNC: 110 U/L
ALT SERPL-CCNC: 23 U/L
ANION GAP SERPL CALC-SCNC: 16 MMOL/L
AST SERPL-CCNC: 27 U/L
BILIRUB SERPL-MCNC: 0.6 MG/DL
BUN SERPL-MCNC: 30 MG/DL
CALCIUM SERPL-MCNC: 9.8 MG/DL
CHLORIDE SERPL-SCNC: 99 MMOL/L
CO2 SERPL-SCNC: 26 MMOL/L
CREAT SERPL-MCNC: 1.38 MG/DL
EGFR: 54 ML/MIN/1.73M2
GLUCOSE SERPL-MCNC: 73 MG/DL
POTASSIUM SERPL-SCNC: 4.3 MMOL/L
PROT SERPL-MCNC: 6.3 G/DL
SODIUM SERPL-SCNC: 142 MMOL/L

## 2023-07-07 PROBLEM — D69.1 PLATELET DISORDER: Status: ACTIVE | Noted: 2023-07-05

## 2023-07-14 NOTE — ED ADULT NURSE NOTE - NSIMPLEMENTINTERV_GEN_ALL_ED
Neuro: A&Ox4  Cardiac: Sinus tachycardia; Mg, K+, and Phos protocols; heparin drip (next 10a tomorrow morning) and dobutamine drip  Respiratory: WDL; on room air; clear lungs; no cough  GI/: WDL; BM yesterday; produces urine without issue  Endocrine: WDL  Diet/Appetite: Regular diet; 2L fluid restriction  Skin: No skin issues noted  LDA: Left double lumen PICC  Activity: Independent  Pain: No complaints of pain  Plan: More IV dobutamine teaching; ICD placement Monday 17th   Implemented All Universal Safety Interventions:  [place] to call system. Call bell, personal items and telephone within reach. Instruct patient to call for assistance. Room bathroom lighting operational. Non-slip footwear when patient is off stretcher. Physically safe environment: no spills, clutter or unnecessary equipment. Stretcher in lowest position, wheels locked, appropriate side rails in place.

## 2023-07-18 ENCOUNTER — OUTPATIENT (OUTPATIENT)
Dept: OUTPATIENT SERVICES | Facility: HOSPITAL | Age: 73
LOS: 1 days | End: 2023-07-18
Payer: MEDICARE

## 2023-07-18 ENCOUNTER — APPOINTMENT (OUTPATIENT)
Dept: MRI IMAGING | Facility: CLINIC | Age: 73
End: 2023-07-18
Payer: MEDICARE

## 2023-07-18 DIAGNOSIS — Z90.49 ACQUIRED ABSENCE OF OTHER SPECIFIED PARTS OF DIGESTIVE TRACT: Chronic | ICD-10-CM

## 2023-07-18 DIAGNOSIS — Z98.890 OTHER SPECIFIED POSTPROCEDURAL STATES: Chronic | ICD-10-CM

## 2023-07-18 DIAGNOSIS — D03.9 MELANOMA IN SITU, UNSPECIFIED: Chronic | ICD-10-CM

## 2023-07-18 DIAGNOSIS — I71.9 AORTIC ANEURYSM OF UNSPECIFIED SITE, WITHOUT RUPTURE: ICD-10-CM

## 2023-07-18 PROCEDURE — C8909: CPT

## 2023-07-18 PROCEDURE — 71555 MRI ANGIO CHEST W OR W/O DYE: CPT | Mod: 26,MH

## 2023-07-18 PROCEDURE — A9585: CPT

## 2023-07-21 ENCOUNTER — APPOINTMENT (OUTPATIENT)
Dept: MRI IMAGING | Facility: CLINIC | Age: 73
End: 2023-07-21
Payer: MEDICARE

## 2023-07-21 ENCOUNTER — OUTPATIENT (OUTPATIENT)
Dept: OUTPATIENT SERVICES | Facility: HOSPITAL | Age: 73
LOS: 1 days | End: 2023-07-21
Payer: MEDICARE

## 2023-07-21 DIAGNOSIS — Z98.890 OTHER SPECIFIED POSTPROCEDURAL STATES: Chronic | ICD-10-CM

## 2023-07-21 DIAGNOSIS — I71.9 AORTIC ANEURYSM OF UNSPECIFIED SITE, WITHOUT RUPTURE: ICD-10-CM

## 2023-07-21 DIAGNOSIS — D03.9 MELANOMA IN SITU, UNSPECIFIED: Chronic | ICD-10-CM

## 2023-07-21 DIAGNOSIS — Z90.49 ACQUIRED ABSENCE OF OTHER SPECIFIED PARTS OF DIGESTIVE TRACT: Chronic | ICD-10-CM

## 2023-07-21 PROCEDURE — 74185 MRA ABD W OR W/O CNTRST: CPT | Mod: 26,MH

## 2023-07-21 PROCEDURE — A9585: CPT

## 2023-07-21 PROCEDURE — C8902: CPT

## 2023-08-04 ENCOUNTER — NON-APPOINTMENT (OUTPATIENT)
Age: 73
End: 2023-08-04

## 2023-08-04 ENCOUNTER — APPOINTMENT (OUTPATIENT)
Dept: ELECTROPHYSIOLOGY | Facility: CLINIC | Age: 73
End: 2023-08-04
Payer: MEDICARE

## 2023-08-04 PROCEDURE — G2066: CPT

## 2023-08-04 PROCEDURE — 93298 REM INTERROG DEV EVAL SCRMS: CPT

## 2023-08-08 ENCOUNTER — APPOINTMENT (OUTPATIENT)
Dept: CARDIOLOGY | Facility: CLINIC | Age: 73
End: 2023-08-08

## 2023-08-10 ENCOUNTER — APPOINTMENT (OUTPATIENT)
Dept: CARDIOLOGY | Facility: CLINIC | Age: 73
End: 2023-08-10

## 2023-08-11 ENCOUNTER — APPOINTMENT (OUTPATIENT)
Dept: UROLOGY | Facility: CLINIC | Age: 73
End: 2023-08-11
Payer: MEDICARE

## 2023-08-11 VITALS
SYSTOLIC BLOOD PRESSURE: 133 MMHG | TEMPERATURE: 97.1 F | RESPIRATION RATE: 16 BRPM | DIASTOLIC BLOOD PRESSURE: 81 MMHG | HEART RATE: 71 BPM

## 2023-08-11 DIAGNOSIS — C64.2 MALIGNANT NEOPLASM OF LEFT KIDNEY, EXCEPT RENAL PELVIS: ICD-10-CM

## 2023-08-11 PROCEDURE — 99212 OFFICE O/P EST SF 10 MIN: CPT

## 2023-08-11 NOTE — HISTORY OF PRESENT ILLNESS
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 Nocturia once. FOS ok with no hesitancy, intermittency or straining. No dysuria, hematuria or UTIs. no retention. Has ED following his aneurysm surgery - not interested in medications. PSA 3.1 .   DUE to COVID opted fopr TEB. No change in LIUTs - nothing new. no dysuria or hematuria.   seen 8/21 with finding of a left renal mass noted of recent CT scan performed as f/u for AAA reviewed this scan and one from last year with patient and wife: notes a 1.6cm mixed cystic solid lesion anterior surface of right kidney; noted lesion present last year at 1cm and more systic then. Also note 2 enlarged LNs one 2cm in the left hilum   now s/p Lap partial Nephrectomy and LN removal  doing well with no issues   3/22 - here for f/u. no kidney issues.  transferred his lymphoma care to [facility] [place] - on AS.   8/22 had CT oat [place] - no recurrence. note the lymphadenopathy  as before.   1/23 - opted for TEB concern about recent PSA - 5.06 from 3.95 8/20 no change in voiding  8/23 - doing well. voiding OK. - can have urgency on occasion.  CT and MRI - no sign of recurrence

## 2023-08-12 PROBLEM — C64.2 RENAL CELL CARCINOMA OF LEFT KIDNEY: Status: ACTIVE | Noted: 2021-09-17

## 2023-09-08 ENCOUNTER — APPOINTMENT (OUTPATIENT)
Dept: ELECTROPHYSIOLOGY | Facility: CLINIC | Age: 73
End: 2023-09-08
Payer: MEDICARE

## 2023-09-08 ENCOUNTER — NON-APPOINTMENT (OUTPATIENT)
Age: 73
End: 2023-09-08

## 2023-09-08 PROCEDURE — G2066: CPT

## 2023-09-08 PROCEDURE — 93298 REM INTERROG DEV EVAL SCRMS: CPT

## 2023-09-13 ENCOUNTER — APPOINTMENT (OUTPATIENT)
Dept: CARDIOTHORACIC SURGERY | Facility: CLINIC | Age: 73
End: 2023-09-13
Payer: MEDICARE

## 2023-09-13 VITALS
HEIGHT: 69 IN | HEART RATE: 65 BPM | WEIGHT: 170 LBS | SYSTOLIC BLOOD PRESSURE: 170 MMHG | OXYGEN SATURATION: 99 % | BODY MASS INDEX: 25.18 KG/M2 | RESPIRATION RATE: 14 BRPM | TEMPERATURE: 97.4 F | DIASTOLIC BLOOD PRESSURE: 98 MMHG

## 2023-09-13 DIAGNOSIS — Z09 ENCOUNTER FOR FOLLOW-UP EXAMINATION AFTER COMPLETED TREATMENT FOR CONDITIONS OTHER THAN MALIGNANT NEOPLASM: ICD-10-CM

## 2023-09-13 PROCEDURE — 99214 OFFICE O/P EST MOD 30 MIN: CPT

## 2023-09-15 PROBLEM — Z09 SURGICAL FOLLOWUP VISIT: Status: ACTIVE | Noted: 2019-04-10

## 2023-10-02 ENCOUNTER — APPOINTMENT (OUTPATIENT)
Dept: ELECTROPHYSIOLOGY | Facility: CLINIC | Age: 73
End: 2023-10-02
Payer: MEDICARE

## 2023-10-02 VITALS — DIASTOLIC BLOOD PRESSURE: 70 MMHG | SYSTOLIC BLOOD PRESSURE: 131 MMHG | HEART RATE: 63 BPM

## 2023-10-02 PROCEDURE — 93285 PRGRMG DEV EVAL SCRMS IP: CPT

## 2023-10-02 RX ORDER — DAPAGLIFLOZIN 10 MG/1
10 TABLET, FILM COATED ORAL DAILY
Refills: 0 | Status: ACTIVE | COMMUNITY

## 2023-11-05 NOTE — ASU PATIENT PROFILE, ADULT - FALL HARM RISK TYPE OF ASSESSMENT
Admission Communicate risk of Fall with Harm to all staff, patient, and family/Provide visual cue: red socks, yellow wristband, yellow gown, etc/Reinforce activity limits and safety measures with patient and family/Bed in lowest position, wheels locked, appropriate side rails in place/Call bell, personal items and telephone in reach/Instruct patient to call for assistance before getting out of bed/chair/stretcher/Non-slip footwear applied when patient is off stretcher/[place] to call system/Physically safe environment - no spills, clutter or unnecessary equipment/Purposeful Proactive Rounding/Room/bathroom lighting operational, light cord in reach Communicate risk of Fall with Harm to all staff, patient, and family/Provide visual cue: red socks, yellow wristband, yellow gown, etc/Reinforce activity limits and safety measures with patient and family/Bed in lowest position, wheels locked, appropriate side rails in place/Call bell, personal items and telephone in reach/Instruct patient to call for assistance before getting out of bed/chair/stretcher/Non-slip footwear applied when patient is off stretcher/[place] to call system/Physically safe environment - no spills, clutter or unnecessary equipment/Purposeful Proactive Rounding/Room/bathroom lighting operational, light cord in reach Communicate risk of Fall with Harm to all staff, patient, and family/Provide visual cue: red socks, yellow wristband, yellow gown, etc/Reinforce activity limits and safety measures with patient and family/Bed in lowest position, wheels locked, appropriate side rails in place/Call bell, personal items and telephone in reach/Instruct patient to call for assistance before getting out of bed/chair/stretcher/Non-slip footwear applied when patient is off stretcher/[place] to call system/Physically safe environment - no spills, clutter or unnecessary equipment/Purposeful Proactive Rounding/Room/bathroom lighting operational, light cord in reach

## 2023-11-06 ENCOUNTER — NON-APPOINTMENT (OUTPATIENT)
Age: 73
End: 2023-11-06

## 2023-11-06 ENCOUNTER — APPOINTMENT (OUTPATIENT)
Dept: ELECTROPHYSIOLOGY | Facility: CLINIC | Age: 73
End: 2023-11-06
Payer: MEDICARE

## 2023-11-07 PROCEDURE — 93298 REM INTERROG DEV EVAL SCRMS: CPT

## 2023-11-07 PROCEDURE — G2066: CPT | Mod: NC

## 2023-11-13 ENCOUNTER — RX RENEWAL (OUTPATIENT)
Age: 73
End: 2023-11-13

## 2023-12-11 ENCOUNTER — APPOINTMENT (OUTPATIENT)
Dept: ELECTROPHYSIOLOGY | Facility: CLINIC | Age: 73
End: 2023-12-11
Payer: MEDICARE

## 2023-12-11 ENCOUNTER — NON-APPOINTMENT (OUTPATIENT)
Age: 73
End: 2023-12-11

## 2023-12-12 PROCEDURE — G2066: CPT

## 2023-12-12 PROCEDURE — 93298 REM INTERROG DEV EVAL SCRMS: CPT

## 2024-01-05 ENCOUNTER — NON-APPOINTMENT (OUTPATIENT)
Age: 74
End: 2024-01-05

## 2024-01-05 ENCOUNTER — APPOINTMENT (OUTPATIENT)
Dept: CARDIOLOGY | Facility: CLINIC | Age: 74
End: 2024-01-05
Payer: MEDICARE

## 2024-01-05 VITALS
SYSTOLIC BLOOD PRESSURE: 148 MMHG | BODY MASS INDEX: 25.33 KG/M2 | DIASTOLIC BLOOD PRESSURE: 90 MMHG | WEIGHT: 171 LBS | HEIGHT: 69 IN | OXYGEN SATURATION: 100 % | HEART RATE: 66 BPM

## 2024-01-05 DIAGNOSIS — I47.19 OTHER SUPRAVENTRICULAR TACHYCARDIA: ICD-10-CM

## 2024-01-05 DIAGNOSIS — R00.1 BRADYCARDIA, UNSPECIFIED: ICD-10-CM

## 2024-01-05 DIAGNOSIS — I71.9 AORTIC ANEURYSM OF UNSPECIFIED SITE, W/OUT RUPTURE: ICD-10-CM

## 2024-01-05 DIAGNOSIS — Z86.79 OTHER SPECIFIED POSTPROCEDURAL STATES: ICD-10-CM

## 2024-01-05 DIAGNOSIS — Z98.890 OTHER SPECIFIED POSTPROCEDURAL STATES: ICD-10-CM

## 2024-01-05 PROCEDURE — 99214 OFFICE O/P EST MOD 30 MIN: CPT

## 2024-01-05 RX ORDER — HYDRALAZINE HYDROCHLORIDE 25 MG/1
25 TABLET ORAL 3 TIMES DAILY
Qty: 270 | Refills: 3 | Status: ACTIVE | COMMUNITY
Start: 2024-01-05

## 2024-01-05 RX ORDER — LOSARTAN POTASSIUM 50 MG/1
50 TABLET, FILM COATED ORAL DAILY
Qty: 90 | Refills: 3 | Status: ACTIVE | COMMUNITY
Start: 2024-01-05 | End: 1900-01-01

## 2024-01-05 RX ORDER — HYDRALAZINE HYDROCHLORIDE 100 MG/1
100 TABLET ORAL
Qty: 270 | Refills: 3 | Status: DISCONTINUED | COMMUNITY
Start: 2017-11-08 | End: 2024-01-05

## 2024-01-06 LAB
COVID-19 NUCLEOCAPSID  GAM ANTIBODY INTERPRETATION: NEGATIVE
COVID-19 SPIKE DOMAIN ANTIBODY INTERPRETATION: POSITIVE
SARS-COV-2 AB SERPL IA-ACNC: >250 U/ML
SARS-COV-2 AB SERPL QL IA: 0.18 INDEX

## 2024-01-08 ENCOUNTER — APPOINTMENT (OUTPATIENT)
Dept: CARDIOLOGY | Facility: CLINIC | Age: 74
End: 2024-01-08
Payer: MEDICARE

## 2024-01-08 PROBLEM — I71.9 AORTIC ANEURYSM: Status: ACTIVE | Noted: 2022-08-23

## 2024-01-08 PROBLEM — Z98.890 S/P AORTIC ANEURYSM REPAIR: Status: ACTIVE | Noted: 2018-04-19

## 2024-01-08 PROBLEM — I47.19 ATRIAL TACHYCARDIA, PAROXYSMAL: Status: ACTIVE | Noted: 2020-09-11

## 2024-01-08 PROBLEM — R00.1 BRADYCARDIA, SINUS: Status: ACTIVE | Noted: 2020-08-24

## 2024-01-08 PROBLEM — Z98.890 S/P AORTIC DISSECTION REPAIR: Status: ACTIVE | Noted: 2021-10-20

## 2024-01-08 PROCEDURE — 93880 EXTRACRANIAL BILAT STUDY: CPT

## 2024-01-10 RX ORDER — LOSARTAN POTASSIUM 25 MG/1
25 TABLET, FILM COATED ORAL DAILY
Qty: 90 | Refills: 3 | Status: ACTIVE | COMMUNITY
Start: 2024-01-10 | End: 1900-01-01

## 2024-01-16 ENCOUNTER — APPOINTMENT (OUTPATIENT)
Dept: ELECTROPHYSIOLOGY | Facility: CLINIC | Age: 74
End: 2024-01-16
Payer: MEDICARE

## 2024-01-16 ENCOUNTER — NON-APPOINTMENT (OUTPATIENT)
Age: 74
End: 2024-01-16

## 2024-01-17 PROCEDURE — 93298 REM INTERROG DEV EVAL SCRMS: CPT

## 2024-02-08 NOTE — ED ADULT NURSE NOTE - NS ED NURSE RECORD ANOTHER VITAL SIGN
"Assessment/Plan:    No problem-specific Assessment & Plan notes found for this encounter.       Diagnoses and all orders for this visit:    Skin pustule  -     mupirocin (BACTROBAN) 2 % ointment; Apply topically 3 (three) times a day for 7 days      Both lesions cleaned with alcohol wipe ,advised to use bactroban oint ,warm compresses ,call if lesions worsen     Subjective:      Patient ID: April Dey is a 3 y.o. female.    For 1 week patient has lesion on right external ear ,there is crusty drainage from it     Earache   Associated symptoms include a rash. Pertinent negatives include no abdominal pain, coughing, ear discharge, sore throat or vomiting.       The following portions of the patient's history were reviewed and updated as appropriate: allergies, current medications, past family history, past medical history, past social history, past surgical history, and problem list.    Review of Systems   Constitutional:  Negative for chills and fever.   HENT:  Positive for ear pain. Negative for ear discharge and sore throat.    Eyes:  Negative for pain and visual disturbance.   Respiratory:  Negative for cough and shortness of breath.    Cardiovascular:  Negative for chest pain and palpitations.   Gastrointestinal:  Negative for abdominal pain and vomiting.   Endocrine: Negative for polyphagia.   Genitourinary:  Negative for dysuria and hematuria.   Musculoskeletal:  Negative for back pain and gait problem.   Skin:  Positive for rash. Negative for color change.   Neurological:  Negative for seizures and syncope.   All other systems reviewed and are negative.        Objective:      BP 98/60   Pulse 105   Temp 97.5 °F (36.4 °C)   Ht 3' 8.88\" (1.14 m)   Wt 19.4 kg (42 lb 12.8 oz)   SpO2 99%   BMI 14.94 kg/m²          Physical Exam  Constitutional:       General: She is active. She is not in acute distress.     Appearance: She is not toxic-appearing.   HENT:      Right Ear: Tympanic membrane and ear " canal normal.      Left Ear: Tympanic membrane and ear canal normal.      Ears:      Comments: External ears : on both externa ears between helix and antihelix erythematous excoriation with yellow crust      Nose: Nose normal.      Mouth/Throat:      Mouth: Mucous membranes are moist.      Pharynx: Oropharynx is clear.   Eyes:      Conjunctiva/sclera: Conjunctivae normal.      Pupils: Pupils are equal, round, and reactive to light.   Cardiovascular:      Rate and Rhythm: Regular rhythm.      Heart sounds: S1 normal and S2 normal. No murmur heard.  Pulmonary:      Effort: Pulmonary effort is normal.      Breath sounds: Normal breath sounds.   Abdominal:      General: There is no distension.      Palpations: Abdomen is soft. There is no mass.      Tenderness: There is no abdominal tenderness. There is no guarding or rebound.      Hernia: No hernia is present.   Musculoskeletal:         General: Normal range of motion.      Cervical back: Normal range of motion and neck supple.   Skin:     General: Skin is warm.      Findings: Rash present.   Neurological:      Mental Status: She is alert.            Yes

## 2024-02-19 ENCOUNTER — NON-APPOINTMENT (OUTPATIENT)
Age: 74
End: 2024-02-19

## 2024-02-20 ENCOUNTER — APPOINTMENT (OUTPATIENT)
Dept: ELECTROPHYSIOLOGY | Facility: CLINIC | Age: 74
End: 2024-02-20
Payer: MEDICARE

## 2024-02-20 PROCEDURE — 93298 REM INTERROG DEV EVAL SCRMS: CPT

## 2024-03-13 NOTE — PATIENT PROFILE ADULT - NSPROGENSOURCEINFO_GEN_A_NUR
Patient Call    Who are you speaking with? Patient    If it is not the patient, are they listed on an active communication consent form? Yes   What is the reason for this call? Patient has concerns before getting her mammo as there has been change in her breast overnight    Does this require a call back? Yes   If a call back is required, please list best call back number [phone]   If a call back is required, advise that a message will be forwarded to their care team and someone will return their call as soon as possible.   Did you relay this information to the patient? Yes      patient

## 2024-03-25 ENCOUNTER — APPOINTMENT (OUTPATIENT)
Dept: ELECTROPHYSIOLOGY | Facility: CLINIC | Age: 74
End: 2024-03-25
Payer: MEDICARE

## 2024-03-25 ENCOUNTER — NON-APPOINTMENT (OUTPATIENT)
Age: 74
End: 2024-03-25

## 2024-03-25 PROCEDURE — 93298 REM INTERROG DEV EVAL SCRMS: CPT

## 2024-04-26 ENCOUNTER — APPOINTMENT (OUTPATIENT)
Dept: ELECTROPHYSIOLOGY | Facility: CLINIC | Age: 74
End: 2024-04-26
Payer: MEDICARE

## 2024-04-26 ENCOUNTER — NON-APPOINTMENT (OUTPATIENT)
Age: 74
End: 2024-04-26

## 2024-04-26 PROCEDURE — 93298 REM INTERROG DEV EVAL SCRMS: CPT

## 2024-05-09 NOTE — H&P PST ADULT - SURGICAL SITE INCISION
Diagnoses and all orders for this visit:  Allergic rhinitis due to pollen, unspecified seasonality  Diarrhea, unspecified type    Increase to 2-5mg claritin to help with allergies, albuterol as needed    Diarrhea- should resolve- use senna in the future   yes no

## 2024-06-07 ENCOUNTER — APPOINTMENT (OUTPATIENT)
Dept: ELECTROPHYSIOLOGY | Facility: CLINIC | Age: 74
End: 2024-06-07
Payer: MEDICARE

## 2024-06-07 ENCOUNTER — NON-APPOINTMENT (OUTPATIENT)
Age: 74
End: 2024-06-07

## 2024-06-07 VITALS
SYSTOLIC BLOOD PRESSURE: 130 MMHG | OXYGEN SATURATION: 98 % | WEIGHT: 171 LBS | DIASTOLIC BLOOD PRESSURE: 81 MMHG | BODY MASS INDEX: 25.33 KG/M2 | HEIGHT: 69 IN | HEART RATE: 72 BPM

## 2024-06-07 DIAGNOSIS — E78.5 HYPERLIPIDEMIA, UNSPECIFIED: ICD-10-CM

## 2024-06-07 DIAGNOSIS — I49.3 VENTRICULAR PREMATURE DEPOLARIZATION: ICD-10-CM

## 2024-06-07 DIAGNOSIS — I10 ESSENTIAL (PRIMARY) HYPERTENSION: ICD-10-CM

## 2024-06-07 DIAGNOSIS — R00.2 PALPITATIONS: ICD-10-CM

## 2024-06-07 PROCEDURE — 99213 OFFICE O/P EST LOW 20 MIN: CPT

## 2024-06-07 PROCEDURE — 93000 ELECTROCARDIOGRAM COMPLETE: CPT

## 2024-06-07 PROCEDURE — G2211 COMPLEX E/M VISIT ADD ON: CPT

## 2024-06-07 RX ORDER — CARVEDILOL 12.5 MG/1
12.5 TABLET, FILM COATED ORAL TWICE DAILY
Qty: 60 | Refills: 5 | Status: ACTIVE | COMMUNITY
Start: 2024-06-07

## 2024-06-07 RX ORDER — METOPROLOL TARTRATE 25 MG/1
25 TABLET, FILM COATED ORAL TWICE DAILY
Refills: 0 | Status: DISCONTINUED | COMMUNITY
Start: 2023-09-15 | End: 2024-06-07

## 2024-06-07 NOTE — CARDIOLOGY SUMMARY
[de-identified] :  9/18/2017, IMPRESSIONS:Normal Study * Myocardial Perfusion SPECT results are normal. * Review of raw data shows: The study is of good technical quality. * The left ventricle was normal in size. Normal myocardial perfusion scan,with no evidence of infarction or inducible ischemia. * Post-stress gated wall motion analysis was performed (LVEF = 54 %;LVEDV = 100 ml.), revealing normal LV function.   [de-identified] : 7/1/2023: MPRESSION: 1. Mild left ventricular hypertrophy with the normal left ventricular systolic function, stage I diastolic dysfunction. 2. aortic sclerosis, moderate aortic regurgitation. 3. mild mitral regurgitation.  3/23/2018, CONCLUSIONS: 1. Mitral annular calcification, otherwise normal mitral valve. Mild mitral regurgitation. 2. Graft noted in the proximal ascending aorta. Mild aortic regurgitation. 3. The aortic root measures 3.3 cm at the Sinus of Valsalva. The ascending aorta measures 4.3 cm. 4. Moderate left atrial enlargement. 5. Normal left ventricular internal dimensions and wall thicknesses. 6. Endocardium not well visualized; grossly normal left ventricular systolic function. 7. Mild diastolic dysfunction (Stage I). 8. Normal right ventricular size and function. LVEF 55%.

## 2024-06-07 NOTE — HISTORY OF PRESENT ILLNESS
[FreeTextEntry1] : Porfirio Hanley is a 74y/o man with Hx of HTN, HLD, GERD, thoracic aortic aneurysm s/p repair, concern for afib, s/p ILR and PVCs who presents today for initial evaluation. Admits doing well, occasionally feels his PVCs, overall burden around 5% on ILR. Denies chest pain, palpitations, SOB, syncope or near syncope.

## 2024-06-07 NOTE — DISCUSSION/SUMMARY
[FreeTextEntry1] : Impression:  1. PVCs: EKG performed today to assess for presence of PVCs and reveals NSR. Review of ILR with noted PVCs correlating to symptoms, 5% burden. On carvedilol. Discussed treatment options for PVCs such as medication vs ablation. Given tolerable, prefers to not treat further at this time. ILR nearing FABBY although not triggered. Will consider ILR explant vs re implant at that time.   2. HTN: resume oral antihypertensives as prescribed. Encouraged heart healthy diet, sodium restriction, and weight loss. Continue regular f/u with Cardiologist for further HTN management.  3. HLD: resume statin therapy as prescribed and regular f/u with Cardiologist for routine lipid monitoring and management. [EKG obtained to assist in diagnosis and management of assessed problem(s)] : EKG obtained to assist in diagnosis and management of assessed problem(s)

## 2024-07-01 ENCOUNTER — OUTPATIENT (OUTPATIENT)
Dept: OUTPATIENT SERVICES | Facility: HOSPITAL | Age: 74
LOS: 1 days | End: 2024-07-01
Payer: MEDICARE

## 2024-07-01 ENCOUNTER — RESULT REVIEW (OUTPATIENT)
Age: 74
End: 2024-07-01

## 2024-07-01 DIAGNOSIS — Z98.890 OTHER SPECIFIED POSTPROCEDURAL STATES: Chronic | ICD-10-CM

## 2024-07-01 DIAGNOSIS — D03.9 MELANOMA IN SITU, UNSPECIFIED: Chronic | ICD-10-CM

## 2024-07-01 DIAGNOSIS — I10 ESSENTIAL (PRIMARY) HYPERTENSION: ICD-10-CM

## 2024-07-01 DIAGNOSIS — Z90.49 ACQUIRED ABSENCE OF OTHER SPECIFIED PARTS OF DIGESTIVE TRACT: Chronic | ICD-10-CM

## 2024-07-01 PROCEDURE — 93306 TTE W/DOPPLER COMPLETE: CPT

## 2024-07-01 PROCEDURE — 93356 MYOCRD STRAIN IMG SPCKL TRCK: CPT

## 2024-07-01 PROCEDURE — 93306 TTE W/DOPPLER COMPLETE: CPT | Mod: 26

## 2024-07-01 PROCEDURE — 76376 3D RENDER W/INTRP POSTPROCES: CPT

## 2024-07-01 PROCEDURE — 76376 3D RENDER W/INTRP POSTPROCES: CPT | Mod: 26

## 2024-07-08 ENCOUNTER — APPOINTMENT (OUTPATIENT)
Dept: ELECTROPHYSIOLOGY | Facility: CLINIC | Age: 74
End: 2024-07-08
Payer: MEDICARE

## 2024-07-08 ENCOUNTER — NON-APPOINTMENT (OUTPATIENT)
Age: 74
End: 2024-07-08

## 2024-07-08 PROCEDURE — 93298 REM INTERROG DEV EVAL SCRMS: CPT

## 2024-07-19 ENCOUNTER — APPOINTMENT (OUTPATIENT)
Dept: CARDIOLOGY | Facility: CLINIC | Age: 74
End: 2024-07-19
Payer: MEDICARE

## 2024-07-19 VITALS
SYSTOLIC BLOOD PRESSURE: 131 MMHG | WEIGHT: 171 LBS | OXYGEN SATURATION: 95 % | HEART RATE: 68 BPM | BODY MASS INDEX: 25.25 KG/M2 | DIASTOLIC BLOOD PRESSURE: 79 MMHG

## 2024-07-19 DIAGNOSIS — Z86.79 OTHER SPECIFIED POSTPROCEDURAL STATES: ICD-10-CM

## 2024-07-19 DIAGNOSIS — I49.3 VENTRICULAR PREMATURE DEPOLARIZATION: ICD-10-CM

## 2024-07-19 DIAGNOSIS — E78.5 HYPERLIPIDEMIA, UNSPECIFIED: ICD-10-CM

## 2024-07-19 DIAGNOSIS — I10 ESSENTIAL (PRIMARY) HYPERTENSION: ICD-10-CM

## 2024-07-19 DIAGNOSIS — Z98.890 OTHER SPECIFIED POSTPROCEDURAL STATES: ICD-10-CM

## 2024-07-19 DIAGNOSIS — R00.1 BRADYCARDIA, UNSPECIFIED: ICD-10-CM

## 2024-07-19 DIAGNOSIS — I71.9 AORTIC ANEURYSM OF UNSPECIFIED SITE, W/OUT RUPTURE: ICD-10-CM

## 2024-07-19 PROCEDURE — 93000 ELECTROCARDIOGRAM COMPLETE: CPT

## 2024-07-19 PROCEDURE — 99214 OFFICE O/P EST MOD 30 MIN: CPT

## 2024-07-30 ENCOUNTER — APPOINTMENT (OUTPATIENT)
Dept: MRI IMAGING | Facility: CLINIC | Age: 74
End: 2024-07-30

## 2024-07-30 ENCOUNTER — OUTPATIENT (OUTPATIENT)
Dept: OUTPATIENT SERVICES | Facility: HOSPITAL | Age: 74
LOS: 1 days | End: 2024-07-30
Payer: MEDICARE

## 2024-07-30 DIAGNOSIS — I71.9 AORTIC ANEURYSM OF UNSPECIFIED SITE, WITHOUT RUPTURE: ICD-10-CM

## 2024-07-30 DIAGNOSIS — Z90.49 ACQUIRED ABSENCE OF OTHER SPECIFIED PARTS OF DIGESTIVE TRACT: Chronic | ICD-10-CM

## 2024-07-30 DIAGNOSIS — Z98.890 OTHER SPECIFIED POSTPROCEDURAL STATES: Chronic | ICD-10-CM

## 2024-07-30 PROCEDURE — A9585: CPT

## 2024-07-30 PROCEDURE — 74185 MRA ABD W OR W/O CNTRST: CPT | Mod: 26,MH

## 2024-07-30 PROCEDURE — C8902: CPT

## 2024-08-02 ENCOUNTER — APPOINTMENT (OUTPATIENT)
Dept: MRI IMAGING | Facility: CLINIC | Age: 74
End: 2024-08-02

## 2024-08-02 ENCOUNTER — OUTPATIENT (OUTPATIENT)
Dept: OUTPATIENT SERVICES | Facility: HOSPITAL | Age: 74
LOS: 1 days | End: 2024-08-02
Payer: MEDICARE

## 2024-08-02 DIAGNOSIS — Z98.890 OTHER SPECIFIED POSTPROCEDURAL STATES: Chronic | ICD-10-CM

## 2024-08-02 DIAGNOSIS — I71.9 AORTIC ANEURYSM OF UNSPECIFIED SITE, WITHOUT RUPTURE: ICD-10-CM

## 2024-08-02 DIAGNOSIS — D03.9 MELANOMA IN SITU, UNSPECIFIED: Chronic | ICD-10-CM

## 2024-08-02 PROCEDURE — A9585: CPT

## 2024-08-02 PROCEDURE — C8909: CPT

## 2024-08-02 PROCEDURE — 71555 MRI ANGIO CHEST W OR W/O DYE: CPT | Mod: 26,MH

## 2024-08-12 ENCOUNTER — APPOINTMENT (OUTPATIENT)
Dept: ELECTROPHYSIOLOGY | Facility: CLINIC | Age: 74
End: 2024-08-12
Payer: MEDICARE

## 2024-08-12 ENCOUNTER — NON-APPOINTMENT (OUTPATIENT)
Age: 74
End: 2024-08-12

## 2024-08-12 PROCEDURE — 93298 REM INTERROG DEV EVAL SCRMS: CPT

## 2024-08-13 ENCOUNTER — APPOINTMENT (OUTPATIENT)
Dept: CARDIOLOGY | Facility: CLINIC | Age: 74
End: 2024-08-13
Payer: MEDICARE

## 2024-08-13 DIAGNOSIS — I47.19 OTHER SUPRAVENTRICULAR TACHYCARDIA: ICD-10-CM

## 2024-08-13 DIAGNOSIS — Z98.890 OTHER SPECIFIED POSTPROCEDURAL STATES: ICD-10-CM

## 2024-08-13 DIAGNOSIS — C91.10 CHRONIC LYMPHOCYTIC LEUKEMIA OF B-CELL TYPE NOT HAVING ACHIEVED REMISSION: ICD-10-CM

## 2024-08-13 DIAGNOSIS — E78.5 HYPERLIPIDEMIA, UNSPECIFIED: ICD-10-CM

## 2024-08-13 DIAGNOSIS — I71.9 AORTIC ANEURYSM OF UNSPECIFIED SITE, W/OUT RUPTURE: ICD-10-CM

## 2024-08-13 DIAGNOSIS — R06.02 SHORTNESS OF BREATH: ICD-10-CM

## 2024-08-13 DIAGNOSIS — I49.3 VENTRICULAR PREMATURE DEPOLARIZATION: ICD-10-CM

## 2024-08-13 DIAGNOSIS — Z86.79 OTHER SPECIFIED POSTPROCEDURAL STATES: ICD-10-CM

## 2024-08-13 DIAGNOSIS — H34.9 UNSPECIFIED RETINAL VASCULAR OCCLUSION: ICD-10-CM

## 2024-08-13 DIAGNOSIS — I10 ESSENTIAL (PRIMARY) HYPERTENSION: ICD-10-CM

## 2024-08-13 PROCEDURE — 99214 OFFICE O/P EST MOD 30 MIN: CPT

## 2024-08-13 PROCEDURE — G2211 COMPLEX E/M VISIT ADD ON: CPT

## 2024-08-13 RX ORDER — AMOXICILLIN 500 MG/1
500 TABLET, FILM COATED ORAL
Qty: 4 | Refills: 2 | Status: ACTIVE | COMMUNITY
Start: 2024-08-13 | End: 1900-01-01

## 2024-08-21 ENCOUNTER — APPOINTMENT (OUTPATIENT)
Dept: UROLOGY | Facility: CLINIC | Age: 74
End: 2024-08-21
Payer: MEDICARE

## 2024-08-21 DIAGNOSIS — C64.2 MALIGNANT NEOPLASM OF LEFT KIDNEY, EXCEPT RENAL PELVIS: ICD-10-CM

## 2024-08-21 PROCEDURE — G2211 COMPLEX E/M VISIT ADD ON: CPT

## 2024-08-21 PROCEDURE — 99212 OFFICE O/P EST SF 10 MIN: CPT

## 2024-08-21 NOTE — HISTORY OF PRESENT ILLNESS
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 Nocturia once. FOS ok with no hesitancy, intermittency or straining. No dysuria, hematuria or UTIs. no retention. Has ED following his aneurysm surgery - not interested in medications. PSA 3.1 .   DUE to COVID opted fopr TEB. No change in LIUTs - nothing new. no dysuria or hematuria.   seen 8/21 with finding of a left renal mass noted of recent CT scan performed as f/u for AAA reviewed this scan and one from last year with patient and wife: notes a 1.6cm mixed cystic solid lesion anterior surface of right kidney; noted lesion present last year at 1cm and more systic then. Also note 2 enlarged LNs one 2cm in the left hilum   now s/p Lap partial Nephrectomy and LN removal  doing well with no issues   3/22 - here for f/u. no kidney issues.  [place] [facility] [place]   8/22 had CT oat [place] - no recurrence. note the lymphadenopathy  as before.   1/23 - opted for TEB concern about recent PSA - 5.06 from 3.95 8/20 no change in voiding  8/23 - doing well. voiding OK. - can have urgency on occasion.  CT and MRI - no sign of recurrence   8/24 - opted for TEB to review imaging  feels well and no new issues. CT noted no evidence of recurrence or new lesions; some small simple cysts

## 2024-08-21 NOTE — ASSESSMENT
[FreeTextEntry1] : VELVET thus far - noted based on size of tumor risk of metastasis very small. would continue yearly surveillance of kidenys

## 2024-09-10 ENCOUNTER — NON-APPOINTMENT (OUTPATIENT)
Age: 74
End: 2024-09-10

## 2024-09-16 ENCOUNTER — APPOINTMENT (OUTPATIENT)
Dept: ELECTROPHYSIOLOGY | Facility: CLINIC | Age: 74
End: 2024-09-16
Payer: MEDICARE

## 2024-09-16 ENCOUNTER — NON-APPOINTMENT (OUTPATIENT)
Age: 74
End: 2024-09-16

## 2024-09-16 PROCEDURE — 93298 REM INTERROG DEV EVAL SCRMS: CPT

## 2024-10-11 NOTE — DISCHARGE NOTE ADULT - INSTRUCTIONS
Karli Lu (Attending) <<----- Click to [facility] Surgeon Watch for signs of infection; redness, swelling, fever, chills or heat, report such symptoms to the MD. No driving while taking pain medication, it causes drowsiness & constipation. Drink 6-8 glasses of fluids daily to promote hydration. No heavy lifting, pulling or pushing heavy objects. Follow up with the MD

## 2024-10-21 ENCOUNTER — NON-APPOINTMENT (OUTPATIENT)
Age: 74
End: 2024-10-21

## 2024-10-22 ENCOUNTER — TRANSCRIPTION ENCOUNTER (OUTPATIENT)
Age: 74
End: 2024-10-22

## 2024-10-22 ENCOUNTER — APPOINTMENT (OUTPATIENT)
Dept: ORTHOPEDIC SURGERY | Facility: CLINIC | Age: 74
End: 2024-10-22
Payer: MEDICARE

## 2024-10-22 VITALS — WEIGHT: 171 LBS | HEIGHT: 69 IN | BODY MASS INDEX: 25.33 KG/M2

## 2024-10-22 PROCEDURE — 99203 OFFICE O/P NEW LOW 30 MIN: CPT | Mod: 25

## 2024-10-22 PROCEDURE — 20610 DRAIN/INJ JOINT/BURSA W/O US: CPT | Mod: RT

## 2024-10-23 ENCOUNTER — APPOINTMENT (OUTPATIENT)
Dept: ELECTROPHYSIOLOGY | Facility: CLINIC | Age: 74
End: 2024-10-23
Payer: MEDICARE

## 2024-10-23 ENCOUNTER — NON-APPOINTMENT (OUTPATIENT)
Age: 74
End: 2024-10-23

## 2024-10-23 VITALS — SYSTOLIC BLOOD PRESSURE: 136 MMHG | HEART RATE: 82 BPM | DIASTOLIC BLOOD PRESSURE: 87 MMHG

## 2024-10-23 PROCEDURE — 93285 PRGRMG DEV EVAL SCRMS IP: CPT

## 2024-10-23 RX ORDER — FUROSEMIDE 40 MG/1
40 TABLET ORAL
Refills: 0 | Status: ACTIVE | COMMUNITY

## 2024-10-28 ENCOUNTER — APPOINTMENT (OUTPATIENT)
Dept: ORTHOPEDIC SURGERY | Facility: CLINIC | Age: 74
End: 2024-10-28
Payer: MEDICARE

## 2024-10-28 VITALS — BODY MASS INDEX: 25.33 KG/M2 | HEIGHT: 69 IN | WEIGHT: 171 LBS

## 2024-10-28 DIAGNOSIS — M76.51 PATELLAR TENDINITIS, RIGHT KNEE: ICD-10-CM

## 2024-10-28 DIAGNOSIS — M11.261 OTHER CHONDROCALCINOSIS, RIGHT KNEE: ICD-10-CM

## 2024-10-28 DIAGNOSIS — M17.11 UNILATERAL PRIMARY OSTEOARTHRITIS, RIGHT KNEE: ICD-10-CM

## 2024-10-28 PROCEDURE — 99213 OFFICE O/P EST LOW 20 MIN: CPT

## 2024-11-25 ENCOUNTER — APPOINTMENT (OUTPATIENT)
Dept: ELECTROPHYSIOLOGY | Facility: CLINIC | Age: 74
End: 2024-11-25
Payer: MEDICARE

## 2024-11-25 ENCOUNTER — NON-APPOINTMENT (OUTPATIENT)
Age: 74
End: 2024-11-25

## 2024-11-25 PROCEDURE — 93298 REM INTERROG DEV EVAL SCRMS: CPT

## 2024-12-30 ENCOUNTER — APPOINTMENT (OUTPATIENT)
Dept: ELECTROPHYSIOLOGY | Facility: CLINIC | Age: 74
End: 2024-12-30
Payer: MEDICARE

## 2024-12-30 ENCOUNTER — NON-APPOINTMENT (OUTPATIENT)
Age: 74
End: 2024-12-30

## 2024-12-30 PROCEDURE — 93298 REM INTERROG DEV EVAL SCRMS: CPT

## 2025-01-17 ENCOUNTER — APPOINTMENT (OUTPATIENT)
Dept: CARDIOLOGY | Facility: CLINIC | Age: 75
End: 2025-01-17
Payer: MEDICARE

## 2025-01-17 ENCOUNTER — APPOINTMENT (OUTPATIENT)
Dept: CARDIOLOGY | Facility: CLINIC | Age: 75
End: 2025-01-17

## 2025-01-17 ENCOUNTER — NON-APPOINTMENT (OUTPATIENT)
Age: 75
End: 2025-01-17

## 2025-01-17 VITALS — DIASTOLIC BLOOD PRESSURE: 80 MMHG | WEIGHT: 170 LBS | BODY MASS INDEX: 25.1 KG/M2 | SYSTOLIC BLOOD PRESSURE: 140 MMHG

## 2025-01-17 DIAGNOSIS — R06.02 SHORTNESS OF BREATH: ICD-10-CM

## 2025-01-17 DIAGNOSIS — I71.9 AORTIC ANEURYSM OF UNSPECIFIED SITE, W/OUT RUPTURE: ICD-10-CM

## 2025-01-17 DIAGNOSIS — Z98.890 OTHER SPECIFIED POSTPROCEDURAL STATES: ICD-10-CM

## 2025-01-17 DIAGNOSIS — Z86.79 OTHER SPECIFIED POSTPROCEDURAL STATES: ICD-10-CM

## 2025-01-17 DIAGNOSIS — R00.1 BRADYCARDIA, UNSPECIFIED: ICD-10-CM

## 2025-01-17 DIAGNOSIS — E78.5 HYPERLIPIDEMIA, UNSPECIFIED: ICD-10-CM

## 2025-01-17 DIAGNOSIS — I47.19 OTHER SUPRAVENTRICULAR TACHYCARDIA: ICD-10-CM

## 2025-01-17 PROCEDURE — G2211 COMPLEX E/M VISIT ADD ON: CPT

## 2025-01-17 PROCEDURE — 93000 ELECTROCARDIOGRAM COMPLETE: CPT

## 2025-01-17 PROCEDURE — 99214 OFFICE O/P EST MOD 30 MIN: CPT

## 2025-01-31 ENCOUNTER — APPOINTMENT (OUTPATIENT)
Dept: ELECTROPHYSIOLOGY | Facility: CLINIC | Age: 75
End: 2025-01-31
Payer: MEDICARE

## 2025-01-31 ENCOUNTER — NON-APPOINTMENT (OUTPATIENT)
Age: 75
End: 2025-01-31

## 2025-01-31 PROCEDURE — 93298 REM INTERROG DEV EVAL SCRMS: CPT

## 2025-03-07 ENCOUNTER — APPOINTMENT (OUTPATIENT)
Dept: ELECTROPHYSIOLOGY | Facility: CLINIC | Age: 75
End: 2025-03-07
Payer: MEDICARE

## 2025-03-07 ENCOUNTER — NON-APPOINTMENT (OUTPATIENT)
Age: 75
End: 2025-03-07

## 2025-03-07 PROCEDURE — 93298 REM INTERROG DEV EVAL SCRMS: CPT

## 2025-04-11 ENCOUNTER — NON-APPOINTMENT (OUTPATIENT)
Age: 75
End: 2025-04-11

## 2025-04-11 ENCOUNTER — APPOINTMENT (OUTPATIENT)
Dept: ELECTROPHYSIOLOGY | Facility: CLINIC | Age: 75
End: 2025-04-11
Payer: MEDICARE

## 2025-04-11 PROCEDURE — 93298 REM INTERROG DEV EVAL SCRMS: CPT

## 2025-05-15 ENCOUNTER — TRANSCRIPTION ENCOUNTER (OUTPATIENT)
Age: 75
End: 2025-05-15

## 2025-05-16 ENCOUNTER — TRANSCRIPTION ENCOUNTER (OUTPATIENT)
Age: 75
End: 2025-05-16

## 2025-05-16 ENCOUNTER — NON-APPOINTMENT (OUTPATIENT)
Age: 75
End: 2025-05-16

## 2025-05-16 ENCOUNTER — APPOINTMENT (OUTPATIENT)
Dept: ELECTROPHYSIOLOGY | Facility: CLINIC | Age: 75
End: 2025-05-16
Payer: MEDICARE

## 2025-05-16 PROCEDURE — 93298 REM INTERROG DEV EVAL SCRMS: CPT

## 2025-05-21 NOTE — ED PROVIDER NOTE - ENMT NEGATIVE STATEMENT, MLM
Ears: no ear pain and no hearing problems.Nose: no nasal congestion and no nasal drainage.Mouth/Throat: no dysphagia, no hoarseness and no throat pain.Neck: no lumps, no pain, no stiffness and no swollen glands. 36.2

## 2025-05-29 ENCOUNTER — RX RENEWAL (OUTPATIENT)
Age: 75
End: 2025-05-29

## 2025-05-29 RX ORDER — AMOXICILLIN 500 MG/1
500 CAPSULE ORAL
Qty: 4 | Refills: 3 | Status: ACTIVE | COMMUNITY
Start: 2025-05-29 | End: 1900-01-01

## 2025-06-02 ENCOUNTER — APPOINTMENT (OUTPATIENT)
Dept: ULTRASOUND IMAGING | Facility: CLINIC | Age: 75
End: 2025-06-02
Payer: MEDICARE

## 2025-06-02 ENCOUNTER — OUTPATIENT (OUTPATIENT)
Dept: OUTPATIENT SERVICES | Facility: HOSPITAL | Age: 75
LOS: 1 days | End: 2025-06-02
Payer: MEDICARE

## 2025-06-02 DIAGNOSIS — D03.9 MELANOMA IN SITU, UNSPECIFIED: Chronic | ICD-10-CM

## 2025-06-02 DIAGNOSIS — Z98.890 OTHER SPECIFIED POSTPROCEDURAL STATES: Chronic | ICD-10-CM

## 2025-06-02 DIAGNOSIS — Z00.8 ENCOUNTER FOR OTHER GENERAL EXAMINATION: ICD-10-CM

## 2025-06-02 DIAGNOSIS — Z90.49 ACQUIRED ABSENCE OF OTHER SPECIFIED PARTS OF DIGESTIVE TRACT: Chronic | ICD-10-CM

## 2025-06-02 PROCEDURE — 76705 ECHO EXAM OF ABDOMEN: CPT

## 2025-06-02 PROCEDURE — 76705 ECHO EXAM OF ABDOMEN: CPT | Mod: 26

## 2025-06-07 ENCOUNTER — RX RENEWAL (OUTPATIENT)
Age: 75
End: 2025-06-07

## 2025-06-13 ENCOUNTER — APPOINTMENT (OUTPATIENT)
Dept: CT IMAGING | Facility: CLINIC | Age: 75
End: 2025-06-13
Payer: MEDICARE

## 2025-06-13 ENCOUNTER — OUTPATIENT (OUTPATIENT)
Dept: OUTPATIENT SERVICES | Facility: HOSPITAL | Age: 75
LOS: 1 days | End: 2025-06-13
Payer: MEDICARE

## 2025-06-13 DIAGNOSIS — D17.9 BENIGN LIPOMATOUS NEOPLASM, UNSPECIFIED: ICD-10-CM

## 2025-06-13 DIAGNOSIS — Z98.890 OTHER SPECIFIED POSTPROCEDURAL STATES: Chronic | ICD-10-CM

## 2025-06-13 DIAGNOSIS — D03.9 MELANOMA IN SITU, UNSPECIFIED: Chronic | ICD-10-CM

## 2025-06-13 DIAGNOSIS — Z90.49 ACQUIRED ABSENCE OF OTHER SPECIFIED PARTS OF DIGESTIVE TRACT: Chronic | ICD-10-CM

## 2025-06-13 DIAGNOSIS — Z00.8 ENCOUNTER FOR OTHER GENERAL EXAMINATION: ICD-10-CM

## 2025-06-13 PROCEDURE — 74176 CT ABD & PELVIS W/O CONTRAST: CPT

## 2025-06-13 PROCEDURE — 74176 CT ABD & PELVIS W/O CONTRAST: CPT | Mod: 26

## 2025-06-20 ENCOUNTER — APPOINTMENT (OUTPATIENT)
Dept: ELECTROPHYSIOLOGY | Facility: CLINIC | Age: 75
End: 2025-06-20
Payer: MEDICARE

## 2025-06-20 ENCOUNTER — NON-APPOINTMENT (OUTPATIENT)
Age: 75
End: 2025-06-20

## 2025-06-20 PROCEDURE — 93298 REM INTERROG DEV EVAL SCRMS: CPT

## 2025-07-10 ENCOUNTER — APPOINTMENT (OUTPATIENT)
Dept: CV DIAGNOSITCS | Facility: HOSPITAL | Age: 75
End: 2025-07-10

## 2025-07-10 ENCOUNTER — APPOINTMENT (OUTPATIENT)
Dept: CV DIAGNOSTICS | Facility: HOSPITAL | Age: 75
End: 2025-07-10

## 2025-07-12 ENCOUNTER — RX RENEWAL (OUTPATIENT)
Age: 75
End: 2025-07-12

## 2025-07-18 ENCOUNTER — APPOINTMENT (OUTPATIENT)
Dept: CARDIOLOGY | Facility: CLINIC | Age: 75
End: 2025-07-18

## 2025-07-21 ENCOUNTER — TRANSCRIPTION ENCOUNTER (OUTPATIENT)
Age: 75
End: 2025-07-21

## 2025-07-23 ENCOUNTER — RESULT REVIEW (OUTPATIENT)
Age: 75
End: 2025-07-23

## 2025-07-24 ENCOUNTER — OUTPATIENT (OUTPATIENT)
Dept: OUTPATIENT SERVICES | Facility: HOSPITAL | Age: 75
LOS: 1 days | End: 2025-07-24
Payer: MEDICARE

## 2025-07-24 ENCOUNTER — APPOINTMENT (OUTPATIENT)
Dept: MRI IMAGING | Facility: CLINIC | Age: 75
End: 2025-07-24
Payer: MEDICARE

## 2025-07-24 DIAGNOSIS — Z98.890 OTHER SPECIFIED POSTPROCEDURAL STATES: Chronic | ICD-10-CM

## 2025-07-24 DIAGNOSIS — I71.9 AORTIC ANEURYSM OF UNSPECIFIED SITE, WITHOUT RUPTURE: ICD-10-CM

## 2025-07-24 DIAGNOSIS — Z90.49 ACQUIRED ABSENCE OF OTHER SPECIFIED PARTS OF DIGESTIVE TRACT: Chronic | ICD-10-CM

## 2025-07-24 DIAGNOSIS — D03.9 MELANOMA IN SITU, UNSPECIFIED: Chronic | ICD-10-CM

## 2025-07-24 PROCEDURE — C8911: CPT

## 2025-07-24 PROCEDURE — 71555 MRI ANGIO CHEST W OR W/O DYE: CPT | Mod: 26

## 2025-07-24 PROCEDURE — A9585: CPT

## 2025-07-25 ENCOUNTER — NON-APPOINTMENT (OUTPATIENT)
Age: 75
End: 2025-07-25

## 2025-07-25 ENCOUNTER — APPOINTMENT (OUTPATIENT)
Dept: ELECTROPHYSIOLOGY | Facility: CLINIC | Age: 75
End: 2025-07-25
Payer: MEDICARE

## 2025-07-25 PROCEDURE — 93298 REM INTERROG DEV EVAL SCRMS: CPT

## 2025-07-28 ENCOUNTER — OUTPATIENT (OUTPATIENT)
Dept: OUTPATIENT SERVICES | Facility: HOSPITAL | Age: 75
LOS: 1 days | End: 2025-07-28
Payer: MEDICARE

## 2025-07-28 ENCOUNTER — APPOINTMENT (OUTPATIENT)
Dept: MRI IMAGING | Facility: CLINIC | Age: 75
End: 2025-07-28
Payer: MEDICARE

## 2025-07-28 DIAGNOSIS — Z98.890 OTHER SPECIFIED POSTPROCEDURAL STATES: Chronic | ICD-10-CM

## 2025-07-28 DIAGNOSIS — I71.9 AORTIC ANEURYSM OF UNSPECIFIED SITE, WITHOUT RUPTURE: ICD-10-CM

## 2025-07-28 DIAGNOSIS — D03.9 MELANOMA IN SITU, UNSPECIFIED: Chronic | ICD-10-CM

## 2025-07-28 DIAGNOSIS — Z90.49 ACQUIRED ABSENCE OF OTHER SPECIFIED PARTS OF DIGESTIVE TRACT: Chronic | ICD-10-CM

## 2025-07-28 PROCEDURE — C8902: CPT

## 2025-07-28 PROCEDURE — A9585: CPT

## 2025-07-28 PROCEDURE — 74185 MRA ABD W OR W/O CNTRST: CPT | Mod: 26

## 2025-08-04 ENCOUNTER — OUTPATIENT (OUTPATIENT)
Dept: OUTPATIENT SERVICES | Facility: HOSPITAL | Age: 75
LOS: 1 days | End: 2025-08-04
Payer: MEDICARE

## 2025-08-04 ENCOUNTER — RESULT REVIEW (OUTPATIENT)
Age: 75
End: 2025-08-04

## 2025-08-04 DIAGNOSIS — Z98.890 OTHER SPECIFIED POSTPROCEDURAL STATES: Chronic | ICD-10-CM

## 2025-08-04 DIAGNOSIS — D03.9 MELANOMA IN SITU, UNSPECIFIED: Chronic | ICD-10-CM

## 2025-08-04 DIAGNOSIS — Z90.49 ACQUIRED ABSENCE OF OTHER SPECIFIED PARTS OF DIGESTIVE TRACT: Chronic | ICD-10-CM

## 2025-08-04 DIAGNOSIS — I71.9 AORTIC ANEURYSM OF UNSPECIFIED SITE, WITHOUT RUPTURE: ICD-10-CM

## 2025-08-04 PROCEDURE — 93306 TTE W/DOPPLER COMPLETE: CPT

## 2025-08-04 PROCEDURE — 93306 TTE W/DOPPLER COMPLETE: CPT | Mod: 26

## 2025-08-04 PROCEDURE — 93356 MYOCRD STRAIN IMG SPCKL TRCK: CPT

## 2025-08-06 ENCOUNTER — TRANSCRIPTION ENCOUNTER (OUTPATIENT)
Age: 75
End: 2025-08-06

## 2025-08-13 ENCOUNTER — APPOINTMENT (OUTPATIENT)
Dept: CARDIOTHORACIC SURGERY | Facility: CLINIC | Age: 75
End: 2025-08-13
Payer: MEDICARE

## 2025-08-13 VITALS
RESPIRATION RATE: 14 BRPM | HEIGHT: 69 IN | BODY MASS INDEX: 25.18 KG/M2 | SYSTOLIC BLOOD PRESSURE: 165 MMHG | TEMPERATURE: 97.3 F | WEIGHT: 170 LBS | DIASTOLIC BLOOD PRESSURE: 95 MMHG | OXYGEN SATURATION: 96 % | HEART RATE: 65 BPM

## 2025-08-13 DIAGNOSIS — Z98.890 OTHER SPECIFIED POSTPROCEDURAL STATES: ICD-10-CM

## 2025-08-13 PROCEDURE — 99214 OFFICE O/P EST MOD 30 MIN: CPT

## 2025-08-26 ENCOUNTER — APPOINTMENT (OUTPATIENT)
Dept: ELECTROPHYSIOLOGY | Facility: CLINIC | Age: 75
End: 2025-08-26
Payer: MEDICARE

## 2025-08-26 ENCOUNTER — NON-APPOINTMENT (OUTPATIENT)
Age: 75
End: 2025-08-26

## 2025-08-26 PROCEDURE — 93298 REM INTERROG DEV EVAL SCRMS: CPT

## 2025-09-08 ENCOUNTER — APPOINTMENT (OUTPATIENT)
Dept: CV DIAGNOSITCS | Facility: HOSPITAL | Age: 75
End: 2025-09-08

## 2025-09-08 ENCOUNTER — APPOINTMENT (OUTPATIENT)
Dept: CV DIAGNOSTICS | Facility: HOSPITAL | Age: 75
End: 2025-09-08

## 2025-09-19 ENCOUNTER — NON-APPOINTMENT (OUTPATIENT)
Age: 75
End: 2025-09-19

## 2025-09-19 ENCOUNTER — APPOINTMENT (OUTPATIENT)
Dept: CARDIOLOGY | Facility: CLINIC | Age: 75
End: 2025-09-19
Payer: MEDICARE

## 2025-09-19 VITALS
BODY MASS INDEX: 25.03 KG/M2 | SYSTOLIC BLOOD PRESSURE: 115 MMHG | HEIGHT: 69 IN | HEART RATE: 68 BPM | DIASTOLIC BLOOD PRESSURE: 75 MMHG | OXYGEN SATURATION: 96 % | WEIGHT: 169 LBS

## 2025-09-19 DIAGNOSIS — Z98.890 OTHER SPECIFIED POSTPROCEDURAL STATES: ICD-10-CM

## 2025-09-19 DIAGNOSIS — I47.19 OTHER SUPRAVENTRICULAR TACHYCARDIA: ICD-10-CM

## 2025-09-19 DIAGNOSIS — E78.5 HYPERLIPIDEMIA, UNSPECIFIED: ICD-10-CM

## 2025-09-19 DIAGNOSIS — R00.1 BRADYCARDIA, UNSPECIFIED: ICD-10-CM

## 2025-09-19 DIAGNOSIS — R06.02 SHORTNESS OF BREATH: ICD-10-CM

## 2025-09-19 DIAGNOSIS — I49.3 VENTRICULAR PREMATURE DEPOLARIZATION: ICD-10-CM

## 2025-09-19 DIAGNOSIS — Z86.79 OTHER SPECIFIED POSTPROCEDURAL STATES: ICD-10-CM

## 2025-09-19 DIAGNOSIS — I71.9 AORTIC ANEURYSM OF UNSPECIFIED SITE, W/OUT RUPTURE: ICD-10-CM

## 2025-09-19 PROCEDURE — 99214 OFFICE O/P EST MOD 30 MIN: CPT

## 2025-09-19 PROCEDURE — 93000 ELECTROCARDIOGRAM COMPLETE: CPT

## 2025-09-19 PROCEDURE — G2211 COMPLEX E/M VISIT ADD ON: CPT

## 2025-09-26 LAB
CHOLEST SERPL-MCNC: 137 MG/DL
HDLC SERPL-MCNC: 34 MG/DL
LDLC SERPL-MCNC: 82 MG/DL
NONHDLC SERPL-MCNC: 104 MG/DL
TRIGL SERPL-MCNC: 121 MG/DL